# Patient Record
Sex: MALE | Race: WHITE | NOT HISPANIC OR LATINO | Employment: OTHER | URBAN - METROPOLITAN AREA
[De-identification: names, ages, dates, MRNs, and addresses within clinical notes are randomized per-mention and may not be internally consistent; named-entity substitution may affect disease eponyms.]

---

## 2017-02-23 ENCOUNTER — HOSPITAL ENCOUNTER (EMERGENCY)
Facility: OTHER | Age: 52
Discharge: HOME | End: 2017-02-23
Attending: EMERGENCY MEDICINE
Payer: MEDICAID

## 2017-02-23 VITALS
RESPIRATION RATE: 18 BRPM | DIASTOLIC BLOOD PRESSURE: 89 MMHG | WEIGHT: 162 LBS | OXYGEN SATURATION: 99 % | HEART RATE: 71 BPM | BODY MASS INDEX: 26.96 KG/M2 | TEMPERATURE: 98 F | SYSTOLIC BLOOD PRESSURE: 129 MMHG

## 2017-02-23 DIAGNOSIS — R10.9 ABDOMINAL PAIN, UNSPECIFIED LOCATION: Primary | ICD-10-CM

## 2017-02-23 DIAGNOSIS — R10.9 FLANK PAIN: ICD-10-CM

## 2017-02-23 DIAGNOSIS — N20.0 KIDNEY STONE: ICD-10-CM

## 2017-02-23 LAB
ALBUMIN SERPL-MCNC: 3.7 G/DL (ref 3.3–5.5)
ALP SERPL-CCNC: 64 U/L (ref 42–141)
BILIRUB SERPL-MCNC: 0.7 MG/DL (ref 0.2–1.6)
BILIRUB SERPL-MCNC: NEGATIVE MG/DL
BLOOD, POC UA: NORMAL
BUN SERPL-MCNC: 13 MG/DL (ref 7–22)
CALCIUM SERPL-MCNC: 8.7 MG/DL (ref 8–10.3)
CHLORIDE SERPL-SCNC: 103 MMOL/L (ref 98–108)
CLARITY, POC UA: NORMAL
COLOR, POC UA: NORMAL
CREAT SERPL-MCNC: 0.8 MG/DL (ref 0.6–1.2)
GLUCOSE SERPL-MCNC: 117 MG/DL (ref 73–118)
GLUCOSE SERPL-MCNC: NEGATIVE MG/DL (ref 70–110)
LEUKOCYTE EST, POC UA: NEGATIVE
NITRITE, POC UA: NEGATIVE
PH SMN: 7 [PH]
POC ALT (SGPT): 45 (ref 10–47)
POC AST (SGOT): 47 (ref 11–38)
POC KETONES, BLOOD: NEGATIVE
POC TCO2: 23 (ref 18–33)
POTASSIUM BLD-SCNC: 4.8 MMOL/L (ref 3.6–5.1)
PROTEIN, POC: 7.8 (ref 6.4–8.1)
PROTEIN, POC: NEGATIVE
SODIUM BLD-SCNC: 140 MMOL/L (ref 128–145)
SPECIFIC GRAVITY, POC UA: 1.02
UROBILINOGEN, POC UA: 1 E.U./DL

## 2017-02-23 PROCEDURE — 85025 COMPLETE CBC W/AUTO DIFF WBC: CPT

## 2017-02-23 PROCEDURE — 63600175 PHARM REV CODE 636 W HCPCS: Performed by: EMERGENCY MEDICINE

## 2017-02-23 PROCEDURE — 25500020 PHARM REV CODE 255: Performed by: EMERGENCY MEDICINE

## 2017-02-23 PROCEDURE — 96374 THER/PROPH/DIAG INJ IV PUSH: CPT

## 2017-02-23 PROCEDURE — 81003 URINALYSIS AUTO W/O SCOPE: CPT

## 2017-02-23 PROCEDURE — 80053 COMPREHEN METABOLIC PANEL: CPT

## 2017-02-23 PROCEDURE — 25000003 PHARM REV CODE 250: Performed by: EMERGENCY MEDICINE

## 2017-02-23 PROCEDURE — 96375 TX/PRO/DX INJ NEW DRUG ADDON: CPT

## 2017-02-23 PROCEDURE — 99284 EMERGENCY DEPT VISIT MOD MDM: CPT | Mod: 25

## 2017-02-23 PROCEDURE — 96361 HYDRATE IV INFUSION ADD-ON: CPT

## 2017-02-23 RX ORDER — HYDROMORPHONE HYDROCHLORIDE 1 MG/ML
1 INJECTION, SOLUTION INTRAMUSCULAR; INTRAVENOUS; SUBCUTANEOUS
Status: COMPLETED | OUTPATIENT
Start: 2017-02-23 | End: 2017-02-23

## 2017-02-23 RX ORDER — MORPHINE SULFATE 2 MG/ML
4 INJECTION, SOLUTION INTRAMUSCULAR; INTRAVENOUS
Status: COMPLETED | OUTPATIENT
Start: 2017-02-23 | End: 2017-02-23

## 2017-02-23 RX ORDER — CLOPIDOGREL BISULFATE 75 MG/1
75 TABLET ORAL DAILY
Qty: 30 TABLET | Refills: 0 | Status: SHIPPED | OUTPATIENT
Start: 2017-02-23 | End: 2017-04-27

## 2017-02-23 RX ORDER — ONDANSETRON 4 MG/1
4 TABLET, ORALLY DISINTEGRATING ORAL
Status: COMPLETED | OUTPATIENT
Start: 2017-02-23 | End: 2017-02-23

## 2017-02-23 RX ORDER — HYDROCODONE BITARTRATE AND ACETAMINOPHEN 5; 325 MG/1; MG/1
1 TABLET ORAL EVERY 6 HOURS PRN
Qty: 12 TABLET | Refills: 0 | Status: SHIPPED | OUTPATIENT
Start: 2017-02-23 | End: 2017-05-17

## 2017-02-23 RX ORDER — SODIUM CHLORIDE 9 MG/ML
1000 INJECTION, SOLUTION INTRAVENOUS
Status: COMPLETED | OUTPATIENT
Start: 2017-02-23 | End: 2017-02-23

## 2017-02-23 RX ADMIN — IOHEXOL 100 ML: 350 INJECTION, SOLUTION INTRAVENOUS at 11:02

## 2017-02-23 RX ADMIN — MORPHINE SULFATE 4 MG: 2 INJECTION, SOLUTION INTRAMUSCULAR; INTRAVENOUS at 10:02

## 2017-02-23 RX ADMIN — HYDROMORPHONE HYDROCHLORIDE 1 MG: 1 INJECTION, SOLUTION INTRAMUSCULAR; INTRAVENOUS; SUBCUTANEOUS at 01:02

## 2017-02-23 RX ADMIN — ONDANSETRON 4 MG: 4 TABLET, ORALLY DISINTEGRATING ORAL at 10:02

## 2017-02-23 RX ADMIN — SODIUM CHLORIDE 1000 ML: 9 INJECTION, SOLUTION INTRAVENOUS at 10:02

## 2017-02-23 NOTE — ED NOTES
Visit Vitals    /63    Pulse 76    Temp 98.3 °F (36.8 °C)    Resp 18    Wt 73.5 kg (162 lb)    SpO2 100%    BMI 26.96 kg/m2       General Appearance:  Patient to room 9 with right lower back pain, radiating to right lower abd onset 2 days     Awake, Alert, cooperative, no distress, appears stated age   Head:    Normocephalic, without obvious abnormality, atraumatic   Eyes:    PERRL, conjunctiva/corneas clear, glass on face       Ears:    Normal TM's and external ear canals, both ears   Nose:   Nares normal, septum midline, mucosa normal, no drainage    or sinus tenderness   Throat:   Lips, mucosa, and tongue normal; teeth and gums normal   Neck:   Supple, symmetrical, trachea midline, no adenopathy;        thyroid:  No enlargement/tenderness/nodules; no carotid    bruit or JVD   Back:     Symmetric, no curvature, ROM normal, no CVA tenderness   Lungs:     Clear to auscultation bilaterally anterior, posterior lobes, respirations unlabored   Chest wall:    No tenderness or deformity   Heart:    Regular rate and rhythm per EKG   Abdomen:     Soft, non-tender to touch, bowel sounds active all four quadrants,    Extremities:   Extremities normal, atraumatic, no cyanosis or edema   Pulses:   2+ and symmetric all extremities   Skin:   Skin color, texture, turgor normal, no rashes or lesions   Lymph nodes:   Cervical, supraclavicular, and axillary nodes normal   Neurologic:   CNII-XII intact. Normal strength, sensation and reflexes       throughout

## 2017-02-23 NOTE — ED PROVIDER NOTES
"EM PHYSICIAN NOTE  I have assumed care of this patient and have begun my initial patient assessment.    HPI  Larry Lemon is a 51 y.o. male who arrived at [unfilled] on [unfilled] with a complaint of Chief Complaint   Patient presents with    Back Pain     reports lower back pain, states started on yesterday     REVIEW of PMH, SOC History and Family History:  Past Medical History   Diagnosis Date    Anxiety     Depression     Heart murmur     Hepatitis C     History of psychiatric hospitalization      5 previous hospitalizations.  Last was in 2015 at [facility]    HTN (hypertension) 11/6/2014    Hx of psychiatric care      Celexa, Zoloft, Seroquel     Psychiatric problem      depression and anxiety    Stroke      right side weakness    Therapy      reports last saw psychiatrist last year and he does not remember the name     Past Surgical History   Procedure Laterality Date    Foot surgery      Appendectomy       Social History     Social History    Marital status:      Spouse name: N/A    Number of children: 4    Years of education: N/A     Occupational History    none      Social History Main Topics    Smoking status: Current Every Day Smoker     Packs/day: 2.00     Years: 25.00     Types: Cigarettes    Smokeless tobacco: Not on file      Comment: patient ran out of Chantix    Alcohol use Yes      Comment: Occasionally, last use was 3 days ago    Drug use: Yes     Special: "Crack" cocaine, Marijuana      Comment: using both daily, $100 of crack daily    Sexual activity: Yes     Partners: Female      Comment:      Other Topics Concern    Patient Feels They Ought To Cut Down On Drinking/Drug Use Yes    Patient Annoyed By Others Criticizing Their Drinking/Drug Use Yes    Patient Has Felt Bad Or Guilty About Drinking/Drug Use Yes    Patient Has Had A Drink/Used Drugs As An Eye Opener In [place] No     Social History Narrative    ** Merged History Encounter **      "     Patient Active Problem List   Diagnosis    History of cerebral infarction    Essential hypertension    Humerus fracture    Limb pain    Infected dental carries    Hyperlipidemia    Hemiparesis affecting dominant side as late effect of cerebrovascular accident    Dysarthria    Gait instability    Pain in shoulder    Substance induced mood disorder    Cocaine use disorder, severe, dependence    Marijuana use, continuous    Tobacco abuse disorder    Microcytic anemia    Flank pain    Abdominal pain     Current Facility-Administered Medications   Medication Dose Route Frequency Provider Last Rate Last Dose    hydromorphone (PF) injection 1 mg  1 mg Intravenous ED 1 Time Grzegorz Campo MD         Current Outpatient Prescriptions   Medication Sig Dispense Refill    clopidogrel (PLAVIX) 75 mg tablet Take 1 tablet (75 mg total) by mouth once daily. 30 tablet 5    lisinopril (PRINIVIL,ZESTRIL) 40 MG tablet Take 1 tablet (40 mg total) by mouth once daily.      baclofen (LIORESAL) 10 MG tablet Take 1-2 Q HS for spasms 60 tablet 6    buPROPion (WELLBUTRIN XL) 150 MG TB24 tablet Take 1 tablet (150 mg total) by mouth once daily. 30 tablet 0    mirtazapine (REMERON SOL-TAB) 30 MG disintegrating tablet Take 1 tablet (30 mg total) by mouth nightly. 30 tablet 0    simvastatin (ZOCOR) 40 MG tablet Take 1 tablet (40 mg total) by mouth every evening. 30 tablet 5    tamsulosin (FLOMAX) 0.4 mg Cp24 Take 1 capsule (0.4 mg total) by mouth once daily. 10 capsule 0    varenicline (CHANTIX) 1 mg Tab Take 1 tablet (1 mg total) by mouth 2 (two) times daily. 60 tablet 1     Review of patient's allergies indicates:   Allergen Reactions    Aspirin Shortness Of Breath    Toradol [ketorolac] Shortness Of Breath    Toradol [ketorolac] Anaphylaxis    Aspirin     Codeine     Penicillins     Shellfish containing products     Sulfa (sulfonamide antibiotics)         Immunization History   Administered Date(s)  Administered    Tdap 04/28/2016     Family History   Problem Relation Age of Onset    Drug abuse Mother     Alcohol abuse Father        REVIEW of SYSTEMS  Source: pt    GENERAL/CONSTITUTIONAL: There is no report of fever, fatigue, weakness, or weight loss.  HEAD, EYES, EARS, NOSE AND THROAT: Eyes - There is no report of eye pain. There is no report of loss of hearing or nosebleeds   CARDIOVASCULAR: There is no report of chest pain, irregular heartbeats or palpitation, shortness of breath  RESPIRATORY: There is no report of cough, coughing up blood, coughing up mucus, wheezing or night sweats.  GASTROINTESTINAL: see hpi  GENITOURINARY: There is report of burning with urination  MUSCULOSKELETAL: There is no report of joint or muscle pain. No muscle weakness or tenderness.   SKIN AND BREASTS: There is no report of easy bruising, skin redness, skin rash  NEUROLOGIC: There is no report of headache, dizziness, fainting, loss of consciousness  HEMATOLOGIC/LYMPHATIC: There is no report of anemia, bleeding tendency or clotting tendency.  There is report of anticoagulant use: PLAVIX.  See HPI; the remainder of the ROS is negative.    PHYSICAL EXAMINATION  ED Triage Vitals   Enc Vitals Group      BP 02/23/17 0936 121/63      Pulse 02/23/17 0936 76      Resp 02/23/17 0936 18      Temp 02/23/17 0936 98.3 °F (36.8 °C)      Temp src --       SpO2 02/23/17 0936 100 %      Weight 02/23/17 0936 162 lb      Height --       Head Cir --       Peak Flow --       Pain Score --       Pain Loc --       Pain Edu? --       Excl. in GC? --      Vital signs and Pulse Ox reviewed in clinical context: wnl         Pt's level of consciousness is: Casually dressed, and mild distress.  Skin:Normal  Cardiac exam: regular rate and rhythm, no murmurs  Pulmonary exam: unlabored breathing  Abd Exam: soft, nontender, normal bowel sounds  Musculoskeletal: no joint tenderness, deformity or swelling  Neurologic: cni; 5/5     Labs Reviewed   POCT CBC   POCT  URINALYSIS W/O SCOPE   POCT URINALYSIS W/O SCOPE   POCT CMP   POCT CMP    Study Desc:   CT ABDOMEN PELVIS WITH CONTRAST  Clinical History: Left-sided pain status post fall  Comparison exam: None.     TECHNIQUE: Helical CT imaging of the abdomen and pelvis was performed from the lung bases   through the lesser trochanters following the administration of intravenous contrast.    Axial, sagittal and coronal multiplanar reconstructions provided.  IV contrast: 100 cc iodinated contrast  Oral contrast: No  Exam DLP: 1123 mGy-cm.     FINDINGS:  The visualized lung bases are clear.  No pleural effusions.     The liver, spleen, gallbladder, pancreas, and adrenal glands are unremarkable.    Pancreatic duct at upper limits of normal size without glandular atrophy, tapering at the   ampulla.  No intrahepatic or extrahepatic biliary ductal dilatation.     The kidneys demonstrate symmetric enhancement and excretion of intravenous contrast   without hydronephrosis or stone.  Bladder is unremarkable.     Small hiatal hernia.. The gastrointestinal tract is normal in course and caliber without   focal wall thickening or evidence of obstruction.  Appendix not well seen, possibly   surgically absent.  Sigmoid diverticulosis without evidence of acute diverticulitis.     Shotty subcentimeter periaortic and periportal lymph nodes without pathologic   enlargement.  Mesenteric, retroperitoneal or pelvic adenopathy. No free intraperitoneal   fluid or free air.     No acute osseous abnormality identified.     IMPRESSION:  1.   No acute abnormality identified in the abdomen and pelvis.     2.  Incidental findings:  -Sigmoid diverticulosis without evidence of acute diverticulitis.  -Shotty subcentimeter retroperitoneal lymph nodes without pathologic enlargement,   possibly reactive     SL: 23  Signed by: Jonatan Alvarado MD  2017-02-23 11:32:55 [CST]    LABS: wnl;  UA: +rbcs  Medical decision making: History obtained from family, Labs reviewed  ua + and Radiograph reviewed wnl  Impression: unexplained abd pain; likely renal stone  Plan: analgesics; fup pcp  Grzegorz Campo MD, Emergency Medicine Faculty at 12:59 PM 2/23/2017           Grzegorz Campo MD  02/23/17 8117

## 2017-02-23 NOTE — DISCHARGE INSTRUCTIONS
Kidney Stone (with Pain)    The sharp cramping pain on either side of your lower back and nausea/vomiting that you have are because of a small stone that has formed in the kidney. It is now passing down a narrow tube (ureter) on its way to your bladder. Once the stone reaches your bladder, the pain will often stop. But it may come back as the stone continues to pass out of the bladder and through the urethra. The stone may pass in your urine stream in one piece. The size may be 1/16 inch to 1/4 inch (1 mm to 6 mm). Or, the stone may break up into shae fragments that you may not even notice.  Once you have had a kidney stone, you are at risk of getting another one in the future. There are 4 types of kidney stones. Eighty percent are calcium stones--mostly calcium oxalate but also some with calcium phosphate. The other 3 types include uric acid stones, struvite stones (from a preceding infection), and rarely, cystine stones.  Most stones will pass on their own, but may take from a few hours to a few days. Sometimes the stone is too large to pass by itself. In that case, the healthcare provider will need to use other ways to remove the stone. These techniques include:  · Lithotripsy. This uses ultrasound waves to break up the stone.  · Ureteroscopy. This pushes a basket-like instrument through the urethra and bladder and into the ureter to pull out the stone.  · Various types of direct surgery through the skin  Home care  The following are general care guidelines:  · Drink plenty of fluids. This means at least 12, 8-ounce glasses of fluid--mostly water--a day.  · Each time you urinate, do so in a jar. Pour the urine from the jar through the strainer and into the toilet. Continue doing this until 24 hours after your pain stops. By then, if there was a kidney stone, it should pass from your bladder. Some stones dissolve into sand-like particles and pass right through the strainer. In that case, you wont ever see a  stone.  · Save any stone that you find in the strainer and bring it to your healthcare provider to look at. It may be possible to stop certain types of stones from forming. For this reason, it is important to know what kind of stone you have.  · Try to stay as active as possible. This will help the stone pass. Don't stay in bed unless your pain keeps you from getting up. You may notice a red, pink, or brown color to your urine. This is normal while passing a kidney stone.  · If you develop pain, you may take ibuprofen or naproxen for pain, unless another medicine was prescribed. If you have chronic liver or kidney disease, talk with your healthcare provider before taking these medicines. Also talk with your provider if you've had a stomach ulcer or GI bleeding.  Preventing stones  Each year for the next 5 to 7 years, you are at risk that a new stone will form. Your risk is a 50% chance over this time period. The risk is higher if you have a family history of kidney stones or have certain chronic illnesses like hypertension, obesity, or diabetes. But you can make changes to your lifestyle and diet that can lower your risk for another stone.  Most kidney stones are made of calcium. The following is advice for preventing another calcium stone. If you dont know the type of stone you have, follow this advice until the cause of your stone is found.  Things that help:  · The most important thing you can do is to drink plenty of fluids each day. See home care above.   · Eat foods that contain phytates. These include wheat, rice, rye, barley, and beans. Phytates are substances that may lower your risk for any type of stone to form.  · Eat more fruits and vegetables. Choose those that are high in potassium.  · Eat foods high in natural citrate like fruit and low-sugar fruit juices.  · Having too little calcium in your diet can put you at risk for calcium kidney stones. Eat a normal amount of calcium in your diet and  talk with your healthcare provider if you are taking calcium supplements. Cutting back on your calcium intake may raise your risk. New research shows that eating calcium-rich and oxalate-rich foods together lowers your risk for stones by binding the minerals in the stomach and intestines before they can reach the kidneys.    · Limit salt intake to 2 grams (1 teaspoon) per day. Use limited amounts when cooking, and dont add salt at the table. Processed and canned foods are usually high in salt.   · Spinach, rhubarb, peanuts, cashews, almonds, grapefruit, and grapefruit juice are all high oxalate foods. You should limit how much of these you eat. Or eat them with calcium-rich foods. These include dairy products, dark leafy greens, soy products, and calcium-enriched foods.  · Reducing the amount of animal meat and high protein foods in your diet may lower your risk for uric acid stones.  · Avoid excess sugar (sucrose) and fructose (sweetener in many soft drinks) in your diet.   · If you take vitamin C as a supplement, don't take more than 1,000 mg a day.  · A dietitian or your healthcare provider can give you information about changes in your diet that will help prevent more kidney stones from forming.  Follow-up care  Follow up with your healthcare provider, or as advised, if the pain lasts more than 48 hours. Talk with your provider about urine and blood tests to find out the cause of your stone. If you had an X-ray, CT scan, or other diagnostic test, you will be told of any new findings that may affect your care.  Call 911  Call 911 if you have any of these:  · Weakness, dizziness, or fainting  When to seek medical advice  Call your healthcare provider right away if any of these occur:  · Pain that is not controlled by the medicine given  · Repeated vomiting or unable to keep down fluids  · Fever of 100.4ºF (38ºC) or higher, or as directed by your healthcare provider  · Passage of solid red or brown urine (can't  see through it) or urine with lots of blood clots  · Foul-smelling or cloudy urine  · Unable to pass urine for 8 hours and increasing bladder pressure  Date Last Reviewed: 10/1/2016  © 0565-2810 [employer]. [address] [place] [address], [place] [address]. All rights reserved. This information is not intended as a substitute for professional medical care. Always follow your healthcare professional's instructions.

## 2017-02-23 NOTE — ED AVS SNAPSHOT
[facility] EMERGENCY DEPARTMENT  [address] [place] [address]               Larry Lemon   2017  9:35 AM   ED    Description:  Male : 1965   Department:  [facility] Emergency Department           Your Care was Coordinated By:     Provider Role From To    Raza Sanchez MD Attending Provider 17 [phone]    Grzegorz Campo MD Attending Provider 17 1122 --      Reason for Visit     Back Pain           Diagnoses this Visit        Comments    Abdominal pain, unspecified location    -  Primary     Flank pain         Kidney stone           ED Disposition     ED Disposition Condition Comment    Discharge             To Do List           Follow-up Information     Follow up with Primary Doctor No In 1 week(s).       These Medications        Disp Refills Start End    hydrocodone-acetaminophen 5-325mg (NORCO) 5-325 mg per tablet 12 tablet 0 2017     Take 1 tablet by mouth every 6 (six) hours as needed for Pain. - Oral    Pharmacy: [facility] - [place], [place] - [address] Ph #: [phone]       clopidogrel (PLAVIX) 75 mg tablet 30 tablet 0 2017     Take 1 tablet (75 mg total) by mouth once daily. - Oral    Pharmacy: [facility] - [place], [place] - [address] Ph #: [phone]         [facility]     [facility] Nurse Care Line -  Assistance  Registered nurses in the [facility] provide clinical advisement, health education, appointment booking, and other advisory services.  Call for this free service at [phone].             Medications           Message regarding Medications     Verify the changes and/or additions to your medication regime listed below are the same as discussed with your clinician today.  If any of these changes or additions are incorrect, please notify your healthcare provider.        START taking these NEW  medications        Refills    hydrocodone-acetaminophen 5-325mg (NORCO) 5-325 mg per tablet 0    Sig: Take 1 tablet by mouth every 6 (six) hours as needed for Pain.    Class: Print    Route: Oral      These medications were administered today        Dose Freq    morphine injection 4 mg 4 mg ED 1 Time    Sig: Inject 2 mLs (4 mg total) into the vein ED 1 Time.    Class: Normal    Route: Intravenous    ondansetron disintegrating tablet 4 mg 4 mg ED 1 Time    Sig: Take 1 tablet (4 mg total) by mouth ED 1 Time.    Class: Normal    Route: Oral    0.9%  NaCl infusion 1,000 mL ED 1 Time    Sig: Inject 1,000 mLs into the vein ED 1 Time.    Class: Normal    Route: Intravenous    omnipaque 350 iohexol 100 mL 100 mL IMG once as needed    Sig: Inject 100 mLs into the vein ONCE PRN for contrast.    Class: Normal    Route: Intravenous    hydromorphone (PF) injection 1 mg 1 mg ED 1 Time    Sig: Inject 1 mL (1 mg total) into the vein ED 1 Time.    Class: Normal    Route: Intravenous           Verify that the below list of medications is an accurate representation of the medications you are currently taking.  If none reported, the list may be blank. If incorrect, please contact your healthcare provider. Carry this list with you in case of emergency.           Current Medications     lisinopril (PRINIVIL,ZESTRIL) 40 MG tablet Take 1 tablet (40 mg total) by mouth once daily.    baclofen (LIORESAL) 10 MG tablet Take 1-2 Q HS for spasms    buPROPion (WELLBUTRIN XL) 150 MG TB24 tablet Take 1 tablet (150 mg total) by mouth once daily.    clopidogrel (PLAVIX) 75 mg tablet Take 1 tablet (75 mg total) by mouth once daily.    hydrocodone-acetaminophen 5-325mg (NORCO) 5-325 mg per tablet Take 1 tablet by mouth every 6 (six) hours as needed for Pain.    hydromorphone (PF) injection 1 mg Inject 1 mL (1 mg total) into the vein ED 1 Time.    mirtazapine (REMERON SOL-TAB) 30 MG disintegrating tablet Take 1 tablet (30 mg total) by mouth nightly.     simvastatin (ZOCOR) 40 MG tablet Take 1 tablet (40 mg total) by mouth every evening.    tamsulosin (FLOMAX) 0.4 mg Cp24 Take 1 capsule (0.4 mg total) by mouth once daily.    varenicline (CHANTIX) 1 mg Tab Take 1 tablet (1 mg total) by mouth 2 (two) times daily.           Clinical Reference Information           Your Vitals Were     BP Pulse Temp Resp Weight SpO2    121/63 76 98.3 °F (36.8 °C) 18 73.5 kg (162 lb) 100%    BMI                26.96 kg/m2          Allergies as of 2/23/2017        Reactions    Aspirin Shortness Of Breath    Toradol [Ketorolac] Shortness Of Breath    Toradol [Ketorolac] Anaphylaxis    Aspirin     Codeine     Penicillins     Shellfish Containing Products     Sulfa (Sulfonamide Antibiotics)       Immunizations Administered on Date of Encounter - 2/23/2017     None      ED Micro, Lab, POCT     Start Ordered       Status Ordering Provider    02/23/17 1044 02/23/17 1044  POCT URINALYSIS W/O SCOPE  Once      Final result     02/23/17 1035 02/23/17 1035  POCT CMP  Once      Final result     02/23/17 0953 02/23/17 0953  POCT URINALYSIS W/O SCOPE  Once      Completed     02/23/17 0952 02/23/17 0953  POCT CBC  Once      Final result     02/23/17 0952 02/23/17 0953  POCT CMP  Once      Completed       ED Imaging Orders     Start Ordered       Status Ordering Provider    02/23/17 0952 02/23/17 0953  CT Abdomen Pelvis With Contrast  1 time imaging      Final result         Discharge Instructions           Kidney Stone (with Pain)    The sharp cramping pain on either side of your lower back and nausea/vomiting that you have are because of a small stone that has formed in the kidney. It is now passing down a narrow tube (ureter) on its way to your bladder. Once the stone reaches your bladder, the pain will often stop. But it may come back as the stone continues to pass out of the bladder and through the urethra. The stone may pass in your urine stream in one piece. The size may be 1/16 inch to 1/4 inch  (1 mm to 6 mm). Or, the stone may break up into shae fragments that you may not even notice.  Once you have had a kidney stone, you are at risk of getting another one in the future. There are 4 types of kidney stones. Eighty percent are calcium stones--mostly calcium oxalate but also some with calcium phosphate. The other 3 types include uric acid stones, struvite stones (from a preceding infection), and rarely, cystine stones.  Most stones will pass on their own, but may take from a few hours to a few days. Sometimes the stone is too large to pass by itself. In that case, the healthcare provider will need to use other ways to remove the stone. These techniques include:  · Lithotripsy. This uses ultrasound waves to break up the stone.  · Ureteroscopy. This pushes a basket-like instrument through the urethra and bladder and into the ureter to pull out the stone.  · Various types of direct surgery through the skin  Home care  The following are general care guidelines:  · Drink plenty of fluids. This means at least 12, 8-ounce glasses of fluid--mostly water--a day.  · Each time you urinate, do so in a jar. Pour the urine from the jar through the strainer and into the toilet. Continue doing this until 24 hours after your pain stops. By then, if there was a kidney stone, it should pass from your bladder. Some stones dissolve into sand-like particles and pass right through the strainer. In that case, you wont ever see a stone.  · Save any stone that you find in the strainer and bring it to your healthcare provider to look at. It may be possible to stop certain types of stones from forming. For this reason, it is important to know what kind of stone you have.  · Try to stay as active as possible. This will help the stone pass. Don't stay in bed unless your pain keeps you from getting up. You may notice a red, pink, or brown color to your urine. This is normal while passing a kidney stone.  · If you develop pain, you may  take ibuprofen or naproxen for pain, unless another medicine was prescribed. If you have chronic liver or kidney disease, talk with your healthcare provider before taking these medicines. Also talk with your provider if you've had a stomach ulcer or GI bleeding.  Preventing stones  Each year for the next 5 to 7 years, you are at risk that a new stone will form. Your risk is a 50% chance over this time period. The risk is higher if you have a family history of kidney stones or have certain chronic illnesses like hypertension, obesity, or diabetes. But you can make changes to your lifestyle and diet that can lower your risk for another stone.  Most kidney stones are made of calcium. The following is advice for preventing another calcium stone. If you dont know the type of stone you have, follow this advice until the cause of your stone is found.  Things that help:  · The most important thing you can do is to drink plenty of fluids each day. See home care above.   · Eat foods that contain phytates. These include wheat, rice, rye, barley, and beans. Phytates are substances that may lower your risk for any type of stone to form.  · Eat more fruits and vegetables. Choose those that are high in potassium.  · Eat foods high in natural citrate like fruit and low-sugar fruit juices.  · Having too little calcium in your diet can put you at risk for calcium kidney stones. Eat a normal amount of calcium in your diet and talk with your healthcare provider if you are taking calcium supplements. Cutting back on your calcium intake may raise your risk. New research shows that eating calcium-rich and oxalate-rich foods together lowers your risk for stones by binding the minerals in the stomach and intestines before they can reach the kidneys.    · Limit salt intake to 2 grams (1 teaspoon) per day. Use limited amounts when cooking, and dont add salt at the table. Processed and canned foods are usually high in salt.   · Spinach,  rhubarb, peanuts, cashews, almonds, grapefruit, and grapefruit juice are all high oxalate foods. You should limit how much of these you eat. Or eat them with calcium-rich foods. These include dairy products, dark leafy greens, soy products, and calcium-enriched foods.  · Reducing the amount of animal meat and high protein foods in your diet may lower your risk for uric acid stones.  · Avoid excess sugar (sucrose) and fructose (sweetener in many soft drinks) in your diet.   · If you take vitamin C as a supplement, don't take more than 1,000 mg a day.  · A dietitian or your healthcare provider can give you information about changes in your diet that will help prevent more kidney stones from forming.  Follow-up care  Follow up with your healthcare provider, or as advised, if the pain lasts more than 48 hours. Talk with your provider about urine and blood tests to find out the cause of your stone. If you had an X-ray, CT scan, or other diagnostic test, you will be told of any new findings that may affect your care.  Call 911  Call 911 if you have any of these:  · Weakness, dizziness, or fainting  When to seek medical advice  Call your healthcare provider right away if any of these occur:  · Pain that is not controlled by the medicine given  · Repeated vomiting or unable to keep down fluids  · Fever of 100.4ºF (38ºC) or higher, or as directed by your healthcare provider  · Passage of solid red or brown urine (can't see through it) or urine with lots of blood clots  · Foul-smelling or cloudy urine  · Unable to pass urine for 8 hours and increasing bladder pressure  Date Last Reviewed: 10/1/2016  © 5528-0095 [employer]. [address] [place] [address], [place] [address]. All rights reserved. This information is not intended as a substitute for professional medical care. Always follow your healthcare professional's instructions.          [facility] Sign-Up     Activating your [facility] account is as easy as 1-2-3!      1) Visit [email or web address], select Sign Up Now, enter this activation code and your date of birth, then select Next.  [email or web address]  Expires: 4/9/2017  1:07 PM      2) Create a username and password to use when you visit [facility] in the future and select a security question in case you lose your password and select Next.    3) Enter your e-mail address and click Sign Up!    Additional Information  If you have questions, please e-mail [email or web address] or call [phone] to talk to our [facility] staff. Remember, [facility] is NOT to be used for urgent needs. For medical emergencies, dial 911.         Smoking Cessation     If you would like to quit smoking:   You may be eligible for free services if you are a [place] resident and started smoking cigarettes before September 1, 1988.  Call the Smoking Cessation Trust ([facility]) toll free at ([phone] [phone] or ([phone] [phone].   Call 7-237-QUIT-NOW if you do not meet the above criteria.             [facility] complies with applicable Federal civil rights laws and does not discriminate on the basis of race, color, national origin, age, disability, or sex.        Language Assistance Services     ATTENTION: Language assistance services are available, free of charge. Please call [phone].      ATENCIÓN: Si habla español, tiene a alves disposición servicios gratuitos de asistencia lingüística. Llame al [phone].     CHÚ Ý: N?u b?n nói Ti?ng Vi?t, có các d?ch v? h? tr? ngôn ng? mi?n phí dành cho b?n. G?i s? [phone].

## 2017-02-23 NOTE — ED PROVIDER NOTES
Encounter Date: 2/23/2017       History     Chief Complaint   Patient presents with    Back Pain     reports lower back pain, states started on yesterday     Review of patient's allergies indicates:   Allergen Reactions    Aspirin Shortness Of Breath    Toradol [ketorolac] Shortness Of Breath    Toradol [ketorolac] Anaphylaxis    Aspirin     Codeine     Penicillins     Shellfish containing products     Sulfa (sulfonamide antibiotics)      HPI Comments: Mr Lemon has hx of cva on plavix, walks w cane at baseline otherwise very functional. Reports slipped and fell ~3am yesterday morning with home accident related to dog. Landed on R flank and R hip. Reports painful urination and pain in L flank and LLQ. Vomited x2 today. No cp, syncope or other complaints.     The history is provided by the patient.     Past Medical History   Diagnosis Date    Anxiety     Depression     Heart murmur     Hepatitis C     History of psychiatric hospitalization      5 previous hospitalizations.  Last was in 2015 [facility]    HTN (hypertension) 11/6/2014    Hx of psychiatric care      Celexa, Zoloft, Seroquel     Psychiatric problem      depression and anxiety    Stroke      right side weakness    Therapy      reports last saw psychiatrist last year and he does not remember the name     Past Surgical History   Procedure Laterality Date    Foot surgery      Appendectomy       Family History   Problem Relation Age of Onset    Drug abuse Mother     Alcohol abuse Father      Social History   Substance Use Topics    Smoking status: Current Every Day Smoker     Packs/day: 2.00     Years: 25.00     Types: Cigarettes    Smokeless tobacco: Not on file      Comment: patient ran out of Chantix    Alcohol use Yes      Comment: Occasionally, last use was 3 days ago     Review of Systems   Constitutional: Negative.    Respiratory: Negative.    Genitourinary: Positive for dysuria and flank pain. Negative for difficulty  urinating, discharge, penile pain and penile swelling.   Musculoskeletal: Positive for back pain. Negative for gait problem.   All other systems reviewed and are negative.      Physical Exam   Initial Vitals   BP Pulse Resp Temp SpO2   02/23/17 0936 02/23/17 0936 02/23/17 0936 02/23/17 0936 02/23/17 0936   121/63 76 18 98.3 °F (36.8 °C) 100 %     Physical Exam    Nursing note and vitals reviewed.  Constitutional: He appears well-developed and well-nourished.   HENT:   Head: Normocephalic and atraumatic.   Neck: Normal range of motion.   Cardiovascular: Normal rate, regular rhythm and normal heart sounds.   Pulmonary/Chest: Breath sounds normal. No respiratory distress. He has no wheezes. He has no rales.   Abdominal: Soft. He exhibits no distension. There is tenderness.       Musculoskeletal: Normal range of motion. He exhibits tenderness.   Pain in R flank and R lower back. No bruising noted, no vertebral ttp.    Neurological: He is alert and oriented to person, place, and time.   Psychiatric: He has a normal mood and affect. His behavior is normal. Thought content normal.         ED Course   Procedures  Labs Reviewed   POCT CBC   POCT URINALYSIS W/O SCOPE   POCT URINALYSIS W/O SCOPE   POCT CMP   POCT CMP             Medical Decision Making:   Differential Diagnosis:   Trauma, intraabdominal bleeding, msk injury, renal injury  Clinical Tests:   Lab Tests: Ordered  Radiological Study: Ordered  ED Management:  Plan for symptomatic care, ivf, labs, CT. Final disposition pending. Care transferred to Dr. Campo.                    ED Course     Clinical Impression:   The primary encounter diagnosis was Abdominal pain, unspecified location. A diagnosis of Flank pain was also pertinent to this visit.          Raza Sanchez MD  02/23/17 1217

## 2017-04-27 ENCOUNTER — HOSPITAL ENCOUNTER (EMERGENCY)
Facility: OTHER | Age: 52
Discharge: HOME | End: 2017-04-27
Attending: EMERGENCY MEDICINE
Payer: MEDICAID

## 2017-04-27 VITALS
RESPIRATION RATE: 16 BRPM | BODY MASS INDEX: 30.36 KG/M2 | HEIGHT: 62 IN | OXYGEN SATURATION: 100 % | WEIGHT: 165 LBS | HEART RATE: 73 BPM | SYSTOLIC BLOOD PRESSURE: 125 MMHG | TEMPERATURE: 99 F | DIASTOLIC BLOOD PRESSURE: 87 MMHG

## 2017-04-27 DIAGNOSIS — Z76.0 MEDICATION REFILL: ICD-10-CM

## 2017-04-27 DIAGNOSIS — M54.9 BACK PAIN: ICD-10-CM

## 2017-04-27 DIAGNOSIS — M62.830 BACK MUSCLE SPASM: ICD-10-CM
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
## 2017-04-27 PROCEDURE — 99284 EMERGENCY DEPT VISIT MOD MDM: CPT

## 2017-04-27 PROCEDURE — 25000003 PHARM REV CODE 250: Performed by: EMERGENCY MEDICINE

## 2017-04-27 RX ORDER — CLOPIDOGREL BISULFATE 75 MG/1
75 TABLET ORAL DAILY
Qty: 30 TABLET | Refills: 0 | Status: ON HOLD | OUTPATIENT
Start: 2017-04-27 | End: 2017-05-15

## 2017-04-27 RX ORDER — TRAMADOL HYDROCHLORIDE 50 MG/1
50 TABLET ORAL EVERY 8 HOURS PRN
Qty: 10 TABLET | Refills: 0 | Status: SHIPPED | OUTPATIENT
Start: 2017-04-27 | End: 2017-05-07

## 2017-04-27 RX ORDER — DIAZEPAM 5 MG/1
5 TABLET ORAL
Status: COMPLETED | OUTPATIENT
Start: 2017-04-27 | End: 2017-04-27

## 2017-04-27 RX ORDER — DIAZEPAM 5 MG/1
5 TABLET ORAL EVERY 6 HOURS PRN
Qty: 10 TABLET | Refills: 0 | Status: ON HOLD | OUTPATIENT
Start: 2017-04-27 | End: 2018-04-10 | Stop reason: HOSPADM

## 2017-04-27 RX ADMIN — DIAZEPAM 5 MG: 5 TABLET ORAL at 07:04

## 2017-04-27 NOTE — ED NOTES
Pt identifiers checked and correct.    LOC: The patient is awake, alert and aware of environment with an appropriate affect, the patient is oriented x 3 and speaking appropriately.   APPEARANCE: Patient resting comfortably and in no acute distress, patient is clean and well groomed, patient's clothing is properly fastened.   SKIN: The skin is warm and dry, color consistent with ethnicity, patient has normal skin turgor and moist mucus membranes, skin intact, bruising noted to R posterior and interior thigh   MUSCULOSKELETAL: Patient moving all extremities spontaneously, no obvious swelling or deformities noted.   RESPIRATORY: Airway is open and patent, respirations are spontaneous, patient has a normal effort and rate, no accessory muscle use noted.   CARDIAC: Patient has a normal rate and regular rhythm, no periphreal edema noted, capillary refill < 3 seconds.   ABDOMEN: Soft and non tender to palpation, no distention noted, active bowel sounds present in all four quadrants.   NEUROLOGIC: PERRL, 3 mm bilaterally, eyes open spontaneously, behavior appropriate to situation, follows commands, facial expression symmetrical, bilateral hand grasp equal and even, purposeful motor response noted, normal sensation in all extremities when touched with a finger.

## 2017-04-27 NOTE — DISCHARGE INSTRUCTIONS
Back Care Tips    Caring for your back  These are things you can do to prevent a recurrence of acute back pain and to reduce symptoms from chronic back pain:  · Maintain a healthy weight. If you are overweight, losing weight will help most types of back pain.  · Exercise is an important part of recovery from most types of back pain. The muscles behind and in front of the spine support the back. This means strengthening both the back muscles and the abdominal muscles will provide better support for your spine.   · Swimming and brisk walking are good overall exercises to improve your fitness level.  · Practice safe lifting methods (below).  · Practice good posture when sitting, standing and walking. Avoid prolonged sitting. This puts more stress on the lower back than standing or walking.  · Wear quality shoes with sufficient arch support. Foot and ankle alignment can affect back symptoms. Women should avoid wearing high heels.  · Therapeutic massage can help relax the back muscles without stretching them.  · During the first 24 to 72 hours after an acute injury or flare-up of chronic back pain, apply an ice pack to the painful area for 20 minutes and then remove it for 20 minutes, over a period of 60 to 90 minutes, or several times a day. As a safety precaution, do not use a heating pad at bedtime. Sleeping on a heating pad can lead to skin burns or tissue damage.  · You can alternate ice and heat therapies.  Medications  Talk to your healthcare provider before using medicines, especially if you have other medical problems or are taking other medicines.  · You may use acetaminophen or ibuprofen to control pain, unless your healthcare provider prescribed other pain medicine. If you have chronic conditions like diabetes, liver or kidney disease, stomach ulcers, or gastrointestinal bleeding, or are taking blood thinners, talk with your healthcare provider before taking any medicines.  · Be careful if you are given  prescription pain medicines, narcotics, or medicine for muscle spasm. They can cause drowsiness, affect your coordination, reflexes, and judgment. Do not drive or operate heavy machinery while taking these types of medicines. Take prescription pain medicine only as prescribed by your healthcare provider.  Lumbar stretch  Here is a simple stretching exercise that will help relax muscle spasm and keep your back more limber. If exercise makes your back pain worse, dont do it.  · Lie on your back with your knees bent and both feet on the ground.  · Slowly raise your left knee to your chest as you flatten your lower back against the floor. Hold for 5 seconds.  · Relax and repeat the exercise with your right knee.  · Do 10 of these exercises for each leg.  Safe lifting method  · Dont bend over at the waist to lift an object off the floor.  Instead, bend your knees and hips in a squat.   · Keep your back and head upright  · Hold the object close to your body, directly in front of you.  · Straighten your legs to lift the object.   · Lower the object to the floor in the reverse fashion.  · If you must slide something across the floor, push it.  Posture tips  Sitting  Sit in chairs with straight backs or low-back support. Keep your knees lower than your hips, with your feet flat on the floor.  When driving, sit up straight. Adjust the seat forward so you are not leaning toward the steering wheel.  A small pillow or rolled towel behind your lower back may help if you are driving long distances.   Standing  When standing for long periods, shift most of your weight to one leg at a time. Alternate legs every few minutes.   Sleeping  The best way to sleep is on your side with your knees bent. Put a low pillow under your head to support your neck in a neutral spine position. Avoid thick pillows that bend your neck to one side. Put a pillow between your legs to further relax your lower back. If you sleep on your back, put pillows  under your knees to support your legs in a slightly flexed position. Use a firm mattress. If your mattress sags, replace it, or use a 1/2-inch plywood board under the mattress to add support.  Follow-up care  Follow up with your healthcare provider, or as advised.  If X-rays, a CT scan or an MRI scan were taken, they will be reviewed by a radiologist. You will be notified of any new findings that may affect your care.  Call 911  Seek emergency medical care if any of the following occur:  · Trouble breathing  · Confusion  · Very drowsy  · Fainting or loss of consciousness  · Rapid or very slow heart rate  · Loss of  bowel or bladder control  When to seek medical care  Call your healthcare provider if any of the following occur:  · Pain becomes worse or spreads to your arms or legs  · Weakness or numbness in one or both arms or legs  · Numbness in the groin area  Date Last Reviewed: 6/1/2016  © 7467-5607 [employer]. [address] [place] [address]. All rights reserved. This information is not intended as a substitute for professional medical care. Always follow your healthcare professional's instructions.          Back Pain (Acute or Chronic)    Back pain is one of the most common problems. The good news is that most people feel better in 1 to 2 weeks, and most of the rest in 1 to 2 months. Most people can remain active.  People experience and describe pain differently; not everyone is the same.  · The pain can be sharp, stabbing, shooting, aching, cramping or burning.  · Movement, standing, bending, lifting, sitting, or walking may worsen pain.  · It can be localized to one spot or area, or it can be more generalized.  · It can spread or radiate upwards, to the front, or go down your arms or legs (sciatica).  · It can cause muscle spasm.  Most of the time, mechanical problems with the muscles or spine cause the pain. Mechanical problems are usually caused by an injury to the muscles or  ligaments. While illness can cause back pain, it is usually not caused by a serious illness. Mechanical problems include:   · Physical activity such as sports, exercise, work, or normal activity  · Overexertion, lifting, pushing, pulling incorrectly or too aggressively  · Sudden twisting, bending, or stretching from an accident, or accidental movement  · Poor posture  · Stretching or moving wrong, without noticing pain at the time  · Poor coordination, lack of regular exercise (check with your doctor about this)  · Spinal disc disease or arthritis  · Stress  Pain can also be related to pregnancy, or illness like appendicitis, bladder or kidney infections, pelvic infections, and many other things.  Acute back pain usually gets better in 1 to 2 weeks. Back pain related to disk disease, arthritis in the spinal joints or spinal stenosis (narrowing of the spinal canal) can become chronic and last for months or years.  Unless you had a physical injury (for example, a car accident or fall) X-rays are usually not needed for the initial evaluation of back pain. If pain continues and does not respond to medical treatment, X-rays and other tests may be needed.  Home care  Try these home care recommendations:  · When in bed, try to find a position of comfort. A firm mattress is best. Try lying flat on your back with pillows under your knees. You can also try lying on your side with your knees bent up towards your chest and a pillow between your knees.  · At first, do not try to stretch out the sore spots. If there is a strain, it is not like the good soreness you get after exercising without an injury. In this case, stretching may make it worse.  · Avoid prolong sitting, long car rides, or travel. This puts more stress on the lower back than standing or walking.  · During the first 24 to 72 hours after an acute injury or flare up of chronic back pain, apply an ice pack to the painful area for 20 minutes and then remove it for  20 minutes. Do this over a period of 60 to 90 minutes or several times a day. This will reduce swelling and pain. Wrap the ice pack in a thin towel or plastic to protect your skin.  · You can start with ice, then switch to heat. Heat (hot shower, hot bath, or heating pad) reduces pain and works well for muscle spasms. Heat can be applied to the painful area for 20 minutes then remove it for 20 minutes. Do this over a period of 60 to 90 minutes or several times a day. Do not sleep on a heating pad. It can lead to skin burns or tissue damage.  · You can alternate ice and heat therapy. Talk with your doctor about the best treatment for your back pain.  · Therapeutic massage can help relax the back muscles without stretching them.  · Be aware of safe lifting methods and do not lift anything without stretching first.  Medicines  Talk to your doctor before using medicine, especially if you have other medical problems or are taking other medicines.  · You may use over-the-counter medicine as directed on the bottle to control pain, unless another pain medicine was prescribed. If you have chronic conditions like diabetes, liver or kidney disease, stomach ulcers, or gastrointestinal bleeding, or are taking blood thinners, talk to your doctor before taking any medicine.  · Be careful if you are given a prescription medicines, narcotics, or medicine for muscle spasms. They can cause drowsiness, affect your coordination, reflexes, and judgement. Do not drive or operate heavy machinery.  Follow-up care  Follow up with your healthcare provider, or as advised.   A radiologist will review any X-rays that were taken. Your provide will notify you of any new findings that may affect your care.  Call 911  Call emergency services if any of the following occur:  · Trouble breathing  · Confusion  · Very drowsy or trouble awakening  · Fainting or loss of consciousness  · Rapid or very slow heart rate  · Loss of bowel or bladder  control  When to seek medical advice  Call your healthcare provider right away if any of these occur:   · Pain becomes worse or spreads to your legs  · Weakness or numbness in one or both legs  · Numbness in the groin or genital area  Date Last Reviewed: 7/1/2016 © 2000-2016 [employer].. [address] [place] [address]. All rights reserved. This information is not intended as a substitute for professional medical care. Always follow your healthcare professional's instructions.

## 2017-04-27 NOTE — ED PROVIDER NOTES
Encounter Date: 4/27/2017       History     Chief Complaint   Patient presents with    Back Pain     Review of patient's allergies indicates:   Allergen Reactions    Aspirin Shortness Of Breath    Toradol [ketorolac] Shortness Of Breath    Toradol [ketorolac] Anaphylaxis    Aspirin     Codeine     Penicillins     Shellfish containing products     Sulfa (sulfonamide antibiotics)      HPI Comments: Mr Lemon reports he fell while trying to get into his window because he locked himself out of his house 2 days ago.  Reports bruising to his right thigh that is improving and is not causing any pain.  He also reports a flare of his chronic low back pain, worse than usual.  He did not hit his head, remembers all events, did not lose consciousness.  He reports he vomited dark vomit yesterday one time and is unsure why.  He denies any current abdominal pain, nausea or vomiting today.  He denies any chest pain or shortness breath at any time.  He takes Plavix for history of CVA and he walks with a cane.    The history is provided by the patient.     Past Medical History:   Diagnosis Date    Anxiety     Depression     Heart murmur     Hepatitis C     History of psychiatric hospitalization     5 previous hospitalizations.  Last was in 2015 at [facility]    HTN (hypertension) 11/6/2014    Hx of psychiatric care     Celexa, Zoloft, Seroquel     Psychiatric problem     depression and anxiety    Stroke     right side weakness    Therapy     reports last saw psychiatrist last year and he does not remember the name     Past Surgical History:   Procedure Laterality Date    APPENDECTOMY      FOOT SURGERY       Family History   Problem Relation Age of Onset    Drug abuse Mother     Alcohol abuse Father      Social History   Substance Use Topics    Smoking status: Current Every Day Smoker     Packs/day: 2.00     Years: 25.00     Types: Cigarettes    Smokeless tobacco: Not on file      Comment: patient ran out of  Chantix    Alcohol use Yes      Comment: Occasionally, last use was 3 days ago     Review of Systems   Constitutional: Negative.    Respiratory: Negative.    Cardiovascular: Negative.    Gastrointestinal: Negative for abdominal pain, blood in stool, constipation, diarrhea, nausea and vomiting.        Positive vomited with dark emesis times one episode yesterday.   Musculoskeletal: Positive for back pain. Negative for joint swelling, myalgias, neck pain and neck stiffness.   Skin: Negative.         Positive bruise to right inner thigh.   All other systems reviewed and are negative.      Physical Exam   Initial Vitals   BP Pulse Resp Temp SpO2   04/27/17 0701 04/27/17 0701 04/27/17 0701 04/27/17 0701 04/27/17 0701   125/87 73 16 98.8 °F (37.1 °C) 100 %     Physical Exam    Nursing note and vitals reviewed.  Constitutional: He appears well-developed and well-nourished.   HENT:   Head: Normocephalic and atraumatic.   Eyes: EOM are normal. Pupils are equal, round, and reactive to light.   Neck: Normal range of motion. Neck supple.   Cardiovascular: Normal rate and regular rhythm.   No murmur heard.  Pulmonary/Chest: Breath sounds normal. No respiratory distress. He has no wheezes. He has no rales.   Abdominal: Soft. He exhibits no distension and no mass. There is no tenderness. There is no rebound and no guarding.   Normal rectal tone.  Hemoccult negative.   Musculoskeletal: Normal range of motion. He exhibits no edema.   Positive tenderness to palpation and diffuse lower back.  Positive muscle spasms noted.  No vertebral point tenderness to palpation.  Normal range of motion.   Neurological: He is alert and oriented to person, place, and time.   Skin: Skin is warm and dry. No rash noted. No erythema.   Moderate size ecchymotic bruise noted to the right medial thigh with no surrounding abnormalities.  No tenderness to palpation or deformity noted.  No evidence of infection.   Psychiatric: He has a normal mood and  affect. His behavior is normal. Thought content normal.         ED Course   Procedures  Labs Reviewed - No data to display          Medical Decision Making:   Clinical Tests:   Radiological Study: Ordered and Reviewed  ED Management:  Mr Lemon feels better.  Is felt stable for discharge.  Wants to go home.  Will follow-up as needed.  We discussed that he probably shouldn't be climbing for Windows with his history and being on Plavix.  We discussed worrisome signs that should prompt need to return to the ER should they occur.  There is no indication for further emergent intervention or evaluation at this time.                   ED Course     Clinical Impression:   The primary encounter diagnosis was Fall, initial encounter. Diagnoses of Back pain, Back muscle spasm, and Medication refill were also pertinent to this visit.          Raza Sanchez MD  04/28/17 [id number]

## 2017-04-27 NOTE — ED NOTES
Reports abd pain and possible blood in the stool. Patient reports he is on Plavix his blood thinner since his stroke in 2014

## 2017-04-27 NOTE — ED AVS SNAPSHOT
[facility] EMERGENCY DEPARTMENT  [address] [place] [address]               Larry Ritchie Ion   2017  7:01 AM   ED    Description:  Male : 1965   Department:  [facility]           Your Care was Coordinated By:     Provider Role From To    Raza Sanchez MD Attending Provider 17 0710 --      Reason for Visit     Back Pain           Diagnoses this Visit        Comments    Fall, initial encounter    -  Primary     Back pain         Back muscle spasm         Medication refill           ED Disposition     ED Disposition Condition Comment    Discharge  Larry Ritchie Ion discharge to home/self care.    - Condition at discharge: Stable  - Mode of Discharge: by walking out            To Do List           Follow-up Information     Schedule an appointment as soon as possible for a visit with Maricruz Cloud MD.    Specialty:  Internal Medicine    Why:  to establish new primary care if desired    Contact information:    [address] [place] [address]  [phone]         These Medications        Disp Refills Start End    clopidogrel (PLAVIX) 75 mg tablet 30 tablet 0 2017     Take 1 tablet (75 mg total) by mouth once daily. - Oral    Pharmacy: [facility] - [place], [place] - [address] Ph #: [phone]       tramadol (ULTRAM) 50 mg tablet 10 tablet 0 2017    Take 1 tablet (50 mg total) by mouth every 8 (eight) hours as needed for Pain. - Oral    Pharmacy: [facility] - [place], [place] - [address] Ph #: [phone]       diazePAM (VALIUM) 5 MG tablet 10 tablet 0 2017    Take 1 tablet (5 mg total) by mouth every 6 (six) hours as needed (muscle spasm). - Oral    Pharmacy: [facility] - [place], [place] - [address] Ph #: [phone]         [facility] On [facility]     [facility] Nurse Care Line -   Assistance  Unless otherwise directed by your provider, please contact [facility], our nurse care line that is available for 24/7 assistance.     Registered nurses in the [facility] provide: appointment scheduling, clinical advisement, health education, and other advisory services.  Call: [phone] (toll free)               Medications           Message regarding Medications     Verify the changes and/or additions to your medication regime listed below are the same as discussed with your clinician today.  If any of these changes or additions are incorrect, please notify your healthcare provider.        START taking these NEW medications        Refills    tramadol (ULTRAM) 50 mg tablet 0    Sig: Take 1 tablet (50 mg total) by mouth every 8 (eight) hours as needed for Pain.    Class: Print    Route: Oral    diazePAM (VALIUM) 5 MG tablet 0    Sig: Take 1 tablet (5 mg total) by mouth every 6 (six) hours as needed (muscle spasm).    Class: Print    Route: Oral      These medications were administered today        Dose Freq    diazePAM tablet 5 mg 5 mg ED 1 Time    Sig: Take 1 tablet (5 mg total) by mouth ED 1 Time.    Class: Normal    Route: Oral           Verify that the below list of medications is an accurate representation of the medications you are currently taking.  If none reported, the list may be blank. If incorrect, please contact your healthcare provider. Carry this list with you in case of emergency.           Current Medications     lisinopril (PRINIVIL,ZESTRIL) 40 MG tablet Take 1 tablet (40 mg total) by mouth once daily.    simvastatin (ZOCOR) 40 MG tablet Take 1 tablet (40 mg total) by mouth every evening.    baclofen (LIORESAL) 10 MG tablet Take 1-2 Q HS for spasms    buPROPion (WELLBUTRIN XL) 150 MG TB24 tablet Take 1 tablet (150 mg total) by mouth once daily.    clopidogrel (PLAVIX) 75 mg tablet Take 1 tablet (75 mg total) by mouth once daily.    diazePAM (VALIUM) 5 MG tablet Take  "1 tablet (5 mg total) by mouth every 6 (six) hours as needed (muscle spasm).    diazePAM tablet 5 mg Take 1 tablet (5 mg total) by mouth ED 1 Time.    hydrocodone-acetaminophen 5-325mg (NORCO) 5-325 mg per tablet Take 1 tablet by mouth every 6 (six) hours as needed for Pain.    mirtazapine (REMERON SOL-TAB) 30 MG disintegrating tablet Take 1 tablet (30 mg total) by mouth nightly.    tamsulosin (FLOMAX) 0.4 mg Cp24 Take 1 capsule (0.4 mg total) by mouth once daily.    tramadol (ULTRAM) 50 mg tablet Take 1 tablet (50 mg total) by mouth every 8 (eight) hours as needed for Pain.    varenicline (CHANTIX) 1 mg Tab Take 1 tablet (1 mg total) by mouth 2 (two) times daily.           Clinical Reference Information           Your Vitals Were     BP Pulse Temp Resp Height Weight    125/87 (BP Location: Left arm, Patient Position: Sitting) 73 98.8 °F (37.1 °C) (Skin) 16 5' 2" (1.575 m) 74.8 kg (165 lb)    SpO2 BMI             100% 30.18 kg/m2         Allergies as of 4/27/2017        Reactions    Aspirin Shortness Of Breath    Toradol [Ketorolac] Shortness Of Breath    Toradol [Ketorolac] Anaphylaxis    Aspirin     Codeine     Penicillins     Shellfish Containing Products     Sulfa (Sulfonamide Antibiotics)       Immunizations Administered on Date of Encounter - 4/27/2017     None      ED Micro, Lab, POCT     None      ED Imaging Orders     Start Ordered       Status Ordering Provider    04/27/17 0720 04/27/17 0719  X-Ray Lumbar Spine Ap And Lateral  1 time imaging      Final result         Discharge Instructions         Back Care Tips    Caring for your back  These are things you can do to prevent a recurrence of acute back pain and to reduce symptoms from chronic back pain:  · Maintain a healthy weight. If you are overweight, losing weight will help most types of back pain.  · Exercise is an important part of recovery from most types of back pain. The muscles behind and in front of the spine support the back. This means " strengthening both the back muscles and the abdominal muscles will provide better support for your spine.   · Swimming and brisk walking are good overall exercises to improve your fitness level.  · Practice safe lifting methods (below).  · Practice good posture when sitting, standing and walking. Avoid prolonged sitting. This puts more stress on the lower back than standing or walking.  · Wear quality shoes with sufficient arch support. Foot and ankle alignment can affect back symptoms. Women should avoid wearing high heels.  · Therapeutic massage can help relax the back muscles without stretching them.  · During the first 24 to 72 hours after an acute injury or flare-up of chronic back pain, apply an ice pack to the painful area for 20 minutes and then remove it for 20 minutes, over a period of 60 to 90 minutes, or several times a day. As a safety precaution, do not use a heating pad at bedtime. Sleeping on a heating pad can lead to skin burns or tissue damage.  · You can alternate ice and heat therapies.  Medications  Talk to your healthcare provider before using medicines, especially if you have other medical problems or are taking other medicines.  · You may use acetaminophen or ibuprofen to control pain, unless your healthcare provider prescribed other pain medicine. If you have chronic conditions like diabetes, liver or kidney disease, stomach ulcers, or gastrointestinal bleeding, or are taking blood thinners, talk with your healthcare provider before taking any medicines.  · Be careful if you are given prescription pain medicines, narcotics, or medicine for muscle spasm. They can cause drowsiness, affect your coordination, reflexes, and judgment. Do not drive or operate heavy machinery while taking these types of medicines. Take prescription pain medicine only as prescribed by your healthcare provider.  Lumbar stretch  Here is a simple stretching exercise that will help relax muscle spasm and keep your back  more limber. If exercise makes your back pain worse, dont do it.  · Lie on your back with your knees bent and both feet on the ground.  · Slowly raise your left knee to your chest as you flatten your lower back against the floor. Hold for 5 seconds.  · Relax and repeat the exercise with your right knee.  · Do 10 of these exercises for each leg.  Safe lifting method  · Dont bend over at the waist to lift an object off the floor.  Instead, bend your knees and hips in a squat.   · Keep your back and head upright  · Hold the object close to your body, directly in front of you.  · Straighten your legs to lift the object.   · Lower the object to the floor in the reverse fashion.  · If you must slide something across the floor, push it.  Posture tips  Sitting  Sit in chairs with straight backs or low-back support. Keep your knees lower than your hips, with your feet flat on the floor.  When driving, sit up straight. Adjust the seat forward so you are not leaning toward the steering wheel.  A small pillow or rolled towel behind your lower back may help if you are driving long distances.   Standing  When standing for long periods, shift most of your weight to one leg at a time. Alternate legs every few minutes.   Sleeping  The best way to sleep is on your side with your knees bent. Put a low pillow under your head to support your neck in a neutral spine position. Avoid thick pillows that bend your neck to one side. Put a pillow between your legs to further relax your lower back. If you sleep on your back, put pillows under your knees to support your legs in a slightly flexed position. Use a firm mattress. If your mattress sags, replace it, or use a 1/2-inch plywood board under the mattress to add support.  Follow-up care  Follow up with your healthcare provider, or as advised.  If X-rays, a CT scan or an MRI scan were taken, they will be reviewed by a radiologist. You will be notified of any new findings that may affect  your care.  Call 911  Seek emergency medical care if any of the following occur:  · Trouble breathing  · Confusion  · Very drowsy  · Fainting or loss of consciousness  · Rapid or very slow heart rate  · Loss of  bowel or bladder control  When to seek medical care  Call your healthcare provider if any of the following occur:  · Pain becomes worse or spreads to your arms or legs  · Weakness or numbness in one or both arms or legs  · Numbness in the groin area  Date Last Reviewed: 6/1/2016 © 2000-2016 [employer]. [address] [place] [address]. All rights reserved. This information is not intended as a substitute for professional medical care. Always follow your healthcare professional's instructions.          Back Pain (Acute or Chronic)    Back pain is one of the most common problems. The good news is that most people feel better in 1 to 2 weeks, and most of the rest in 1 to 2 months. Most people can remain active.  People experience and describe pain differently; not everyone is the same.  · The pain can be sharp, stabbing, shooting, aching, cramping or burning.  · Movement, standing, bending, lifting, sitting, or walking may worsen pain.  · It can be localized to one spot or area, or it can be more generalized.  · It can spread or radiate upwards, to the front, or go down your arms or legs (sciatica).  · It can cause muscle spasm.  Most of the time, mechanical problems with the muscles or spine cause the pain. Mechanical problems are usually caused by an injury to the muscles or ligaments. While illness can cause back pain, it is usually not caused by a serious illness. Mechanical problems include:   · Physical activity such as sports, exercise, work, or normal activity  · Overexertion, lifting, pushing, pulling incorrectly or too aggressively  · Sudden twisting, bending, or stretching from an accident, or accidental movement  · Poor posture  · Stretching or moving wrong, without noticing  pain at the time  · Poor coordination, lack of regular exercise (check with your doctor about this)  · Spinal disc disease or arthritis  · Stress  Pain can also be related to pregnancy, or illness like appendicitis, bladder or kidney infections, pelvic infections, and many other things.  Acute back pain usually gets better in 1 to 2 weeks. Back pain related to disk disease, arthritis in the spinal joints or spinal stenosis (narrowing of the spinal canal) can become chronic and last for months or years.  Unless you had a physical injury (for example, a car accident or fall) X-rays are usually not needed for the initial evaluation of back pain. If pain continues and does not respond to medical treatment, X-rays and other tests may be needed.  Home care  Try these home care recommendations:  · When in bed, try to find a position of comfort. A firm mattress is best. Try lying flat on your back with pillows under your knees. You can also try lying on your side with your knees bent up towards your chest and a pillow between your knees.  · At first, do not try to stretch out the sore spots. If there is a strain, it is not like the good soreness you get after exercising without an injury. In this case, stretching may make it worse.  · Avoid prolong sitting, long car rides, or travel. This puts more stress on the lower back than standing or walking.  · During the first 24 to 72 hours after an acute injury or flare up of chronic back pain, apply an ice pack to the painful area for 20 minutes and then remove it for 20 minutes. Do this over a period of 60 to 90 minutes or several times a day. This will reduce swelling and pain. Wrap the ice pack in a thin towel or plastic to protect your skin.  · You can start with ice, then switch to heat. Heat (hot shower, hot bath, or heating pad) reduces pain and works well for muscle spasms. Heat can be applied to the painful area for 20 minutes then remove it for 20 minutes. Do this over  a period of 60 to 90 minutes or several times a day. Do not sleep on a heating pad. It can lead to skin burns or tissue damage.  · You can alternate ice and heat therapy. Talk with your doctor about the best treatment for your back pain.  · Therapeutic massage can help relax the back muscles without stretching them.  · Be aware of safe lifting methods and do not lift anything without stretching first.  Medicines  Talk to your doctor before using medicine, especially if you have other medical problems or are taking other medicines.  · You may use over-the-counter medicine as directed on the bottle to control pain, unless another pain medicine was prescribed. If you have chronic conditions like diabetes, liver or kidney disease, stomach ulcers, or gastrointestinal bleeding, or are taking blood thinners, talk to your doctor before taking any medicine.  · Be careful if you are given a prescription medicines, narcotics, or medicine for muscle spasms. They can cause drowsiness, affect your coordination, reflexes, and judgement. Do not drive or operate heavy machinery.  Follow-up care  Follow up with your healthcare provider, or as advised.   A radiologist will review any X-rays that were taken. Your provide will notify you of any new findings that may affect your care.  Call 911  Call emergency services if any of the following occur:  · Trouble breathing  · Confusion  · Very drowsy or trouble awakening  · Fainting or loss of consciousness  · Rapid or very slow heart rate  · Loss of bowel or bladder control  When to seek medical advice  Call your healthcare provider right away if any of these occur:   · Pain becomes worse or spreads to your legs  · Weakness or numbness in one or both legs  · Numbness in the groin or genital area  Date Last Reviewed: 7/1/2016 © 2000-2016 [employer]. [address] [place] [address], [place] [address]. All rights reserved. This information is not intended as a substitute for  professional medical care. Always follow your healthcare professional's instructions.          Discharge References/Attachments     FALLS, PREVENTING, EXERCISES TO IMPROVE BALANCE, FLEXIBILITY, STRENGTH, AND STAYING POWER (ENGLISH)      [facility] Sign-Up     Activating your [facility] account is as easy as 1-2-3!     1) Visit [email or web address], select Sign Up Now, enter this activation code and your date of birth, then select Next.  [email or web address]  Expires: 6/11/2017  8:09 AM      2) Create a username and password to use when you visit [facility] in the future and select a security question in case you lose your password and select Next.    3) Enter your e-mail address and click Sign Up!    Additional Information  If you have questions, please e-mail [email or web address] or call [phone] to talk to our [facility] staff. Remember, [facility] is NOT to be used for urgent needs. For medical emergencies, dial 911.         Smoking Cessation     If you would like to quit smoking:   You may be eligible for free services if you are a [place] resident and started smoking cigarettes before September 1, 1988.  Call the [employer]oking [employer]essation [employer] ([employer]) toll free at (525) 982-3736 or (101) 451-1696.   Call 1-800-QUIT-NOW if you do not meet the above criteria.   Contact us via email: [email or web address]   View our website for more information: [email or web address]         [facility] complies with applicable Federal civil rights laws and does not discriminate on the basis of race, color, national origin, age, disability, or sex.        Language Assistance Services     ATTENTION: Language assistance services are available, free of charge. Please call [phone].      ATENCIÓN: Si habla jayce, tiene a alves disposición servicios gratuitos de asistencia lingüística. Llame al [phone].     CHÚ Ý: N?u b?n nói Ti?ng Vi?t, có các d?ch v? h? tr? ngôn ng? mi?n phí dành cho b?n. G?i s?  [phone].

## 2017-05-11 ENCOUNTER — HOSPITAL ENCOUNTER (OUTPATIENT)
Facility: HOSPITAL | Age: 52
Discharge: HOME | DRG: 392 | End: 2017-05-15
Attending: EMERGENCY MEDICINE | Admitting: INTERNAL MEDICINE
Payer: MEDICAID

## 2017-05-11 DIAGNOSIS — R10.9 ABDOMINAL PAIN, UNSPECIFIED LOCATION: ICD-10-CM

## 2017-05-11 DIAGNOSIS — R07.89 CHEST DISCOMFORT: ICD-10-CM

## 2017-05-11 DIAGNOSIS — K57.30 DIVERTICULOSIS OF LARGE INTESTINE WITHOUT HEMORRHAGE: Primary | ICD-10-CM

## 2017-05-11 LAB
ALBUMIN SERPL BCP-MCNC: 3.6 G/DL
ALP SERPL-CCNC: 53 U/L
ALT SERPL W/O P-5'-P-CCNC: 20 U/L
ANION GAP SERPL CALC-SCNC: 11 MMOL/L
AST SERPL-CCNC: 17 U/L
BASOPHILS # BLD AUTO: 0.05 K/UL
BASOPHILS NFR BLD: 0.3 %
BILIRUB SERPL-MCNC: 0.6 MG/DL
BILIRUB UR QL STRIP: NEGATIVE
BUN SERPL-MCNC: 12 MG/DL
CALCIUM SERPL-MCNC: 9 MG/DL
CHLORIDE SERPL-SCNC: 101 MMOL/L
CLARITY UR REFRACT.AUTO: CLEAR
CO2 SERPL-SCNC: 23 MMOL/L
COLOR UR AUTO: YELLOW
CREAT SERPL-MCNC: 0.9 MG/DL
DIFFERENTIAL METHOD: ABNORMAL
EOSINOPHIL # BLD AUTO: 0 K/UL
EOSINOPHIL NFR BLD: 0.2 %
ERYTHROCYTE [DISTWIDTH] IN BLOOD BY AUTOMATED COUNT: 15.3 %
EST. GFR  (AFRICAN AMERICAN): >60 ML/MIN/1.73 M^2
EST. GFR  (NON AFRICAN AMERICAN): >60 ML/MIN/1.73 M^2
GLUCOSE SERPL-MCNC: 110 MG/DL
GLUCOSE UR QL STRIP: NEGATIVE
HCT VFR BLD AUTO: 42.1 %
HGB BLD-MCNC: 13.9 G/DL
HGB UR QL STRIP: NEGATIVE
KETONES UR QL STRIP: ABNORMAL
LEUKOCYTE ESTERASE UR QL STRIP: NEGATIVE
LIPASE SERPL-CCNC: 32 U/L
LYMPHOCYTES # BLD AUTO: 2.9 K/UL
LYMPHOCYTES NFR BLD: 14.8 %
MCH RBC QN AUTO: 25.5 PG
MCHC RBC AUTO-ENTMCNC: 33 %
MCV RBC AUTO: 77 FL
MONOCYTES # BLD AUTO: 1.5 K/UL
MONOCYTES NFR BLD: 7.7 %
NEUTROPHILS # BLD AUTO: 14.9 K/UL
NEUTROPHILS NFR BLD: 76.6 %
NITRITE UR QL STRIP: NEGATIVE
PH UR STRIP: 8 [PH] (ref 5–8)
PLATELET # BLD AUTO: 414 K/UL
PMV BLD AUTO: 9.1 FL
POTASSIUM SERPL-SCNC: 3.8 MMOL/L
PROT SERPL-MCNC: 7.5 G/DL
PROT UR QL STRIP: NEGATIVE
RBC # BLD AUTO: 5.46 M/UL
SODIUM SERPL-SCNC: 135 MMOL/L
SP GR UR STRIP: >1.03 (ref 1–1.03)
TROPONIN I SERPL DL<=0.01 NG/ML-MCNC: <0.006 NG/ML
URN SPEC COLLECT METH UR: ABNORMAL
UROBILINOGEN UR STRIP-ACNC: NEGATIVE EU/DL
WBC # BLD AUTO: 19.47 K/UL

## 2017-05-11 PROCEDURE — 83690 ASSAY OF LIPASE: CPT

## 2017-05-11 PROCEDURE — 85025 COMPLETE CBC W/AUTO DIFF WBC: CPT

## 2017-05-11 PROCEDURE — 25000003 PHARM REV CODE 250: Performed by: PHYSICIAN ASSISTANT

## 2017-05-11 PROCEDURE — 93005 ELECTROCARDIOGRAM TRACING: CPT

## 2017-05-11 PROCEDURE — 63600175 PHARM REV CODE 636 W HCPCS: Performed by: STUDENT IN AN ORGANIZED HEALTH CARE EDUCATION/TRAINING PROGRAM

## 2017-05-11 PROCEDURE — 25000003 PHARM REV CODE 250: Performed by: STUDENT IN AN ORGANIZED HEALTH CARE EDUCATION/TRAINING PROGRAM

## 2017-05-11 PROCEDURE — 96361 HYDRATE IV INFUSION ADD-ON: CPT

## 2017-05-11 PROCEDURE — 25000003 PHARM REV CODE 250: Performed by: INTERNAL MEDICINE

## 2017-05-11 PROCEDURE — G0378 HOSPITAL OBSERVATION PER HR: HCPCS

## 2017-05-11 PROCEDURE — 25500020 PHARM REV CODE 255: Performed by: EMERGENCY MEDICINE

## 2017-05-11 PROCEDURE — 63600175 PHARM REV CODE 636 W HCPCS: Performed by: PHYSICIAN ASSISTANT

## 2017-05-11 PROCEDURE — 63600175 PHARM REV CODE 636 W HCPCS: Performed by: INTERNAL MEDICINE

## 2017-05-11 PROCEDURE — 80053 COMPREHEN METABOLIC PANEL: CPT

## 2017-05-11 PROCEDURE — 96366 THER/PROPH/DIAG IV INF ADDON: CPT

## 2017-05-11 PROCEDURE — 96367 TX/PROPH/DG ADDL SEQ IV INF: CPT

## 2017-05-11 PROCEDURE — 93010 ELECTROCARDIOGRAM REPORT: CPT | Mod: ,,, | Performed by: INTERNAL MEDICINE

## 2017-05-11 PROCEDURE — 96375 TX/PRO/DX INJ NEW DRUG ADDON: CPT

## 2017-05-11 PROCEDURE — 99285 EMERGENCY DEPT VISIT HI MDM: CPT | Mod: 25

## 2017-05-11 PROCEDURE — 99223 1ST HOSP IP/OBS HIGH 75: CPT | Mod: ,,, | Performed by: INTERNAL MEDICINE

## 2017-05-11 PROCEDURE — 96365 THER/PROPH/DIAG IV INF INIT: CPT

## 2017-05-11 PROCEDURE — 84484 ASSAY OF TROPONIN QUANT: CPT

## 2017-05-11 PROCEDURE — 81003 URINALYSIS AUTO W/O SCOPE: CPT

## 2017-05-11 RX ORDER — CIPROFLOXACIN 2 MG/ML
400 INJECTION, SOLUTION INTRAVENOUS
Status: COMPLETED | OUTPATIENT
Start: 2017-05-11 | End: 2017-05-11

## 2017-05-11 RX ORDER — CIPROFLOXACIN 2 MG/ML
400 INJECTION, SOLUTION INTRAVENOUS
Status: DISCONTINUED | OUTPATIENT
Start: 2017-05-11 | End: 2017-05-15 | Stop reason: HOSPADM

## 2017-05-11 RX ORDER — ACETAMINOPHEN 325 MG/1
650 TABLET ORAL
Status: DISPENSED | OUTPATIENT
Start: 2017-05-11 | End: 2017-05-11

## 2017-05-11 RX ORDER — METRONIDAZOLE 500 MG/100ML
500 INJECTION, SOLUTION INTRAVENOUS
Status: DISCONTINUED | OUTPATIENT
Start: 2017-05-11 | End: 2017-05-13

## 2017-05-11 RX ORDER — CLOPIDOGREL BISULFATE 75 MG/1
75 TABLET ORAL DAILY
Status: DISCONTINUED | OUTPATIENT
Start: 2017-05-11 | End: 2017-05-15 | Stop reason: HOSPADM

## 2017-05-11 RX ORDER — METRONIDAZOLE 500 MG/100ML
500 INJECTION, SOLUTION INTRAVENOUS
Status: COMPLETED | OUTPATIENT
Start: 2017-05-11 | End: 2017-05-11

## 2017-05-11 RX ORDER — DIAZEPAM 5 MG/1
5 TABLET ORAL EVERY 6 HOURS PRN
Status: DISCONTINUED | OUTPATIENT
Start: 2017-05-11 | End: 2017-05-15 | Stop reason: HOSPADM

## 2017-05-11 RX ORDER — OXYCODONE HYDROCHLORIDE 5 MG/1
5 TABLET ORAL
Status: DISCONTINUED | OUTPATIENT
Start: 2017-05-11 | End: 2017-05-11

## 2017-05-11 RX ORDER — LISINOPRIL 20 MG/1
40 TABLET ORAL DAILY
Status: DISCONTINUED | OUTPATIENT
Start: 2017-05-11 | End: 2017-05-15 | Stop reason: HOSPADM

## 2017-05-11 RX ORDER — METOCLOPRAMIDE HYDROCHLORIDE 5 MG/ML
10 INJECTION INTRAMUSCULAR; INTRAVENOUS
Status: COMPLETED | OUTPATIENT
Start: 2017-05-11 | End: 2017-05-11

## 2017-05-11 RX ORDER — BUPROPION HYDROCHLORIDE 150 MG/1
150 TABLET ORAL DAILY
Status: DISCONTINUED | OUTPATIENT
Start: 2017-05-11 | End: 2017-05-15 | Stop reason: HOSPADM

## 2017-05-11 RX ORDER — HYDROMORPHONE HYDROCHLORIDE 1 MG/ML
0.5 INJECTION, SOLUTION INTRAMUSCULAR; INTRAVENOUS; SUBCUTANEOUS EVERY 6 HOURS PRN
Status: DISCONTINUED | OUTPATIENT
Start: 2017-05-11 | End: 2017-05-13

## 2017-05-11 RX ORDER — SODIUM CHLORIDE 9 MG/ML
1000 INJECTION, SOLUTION INTRAVENOUS CONTINUOUS
Status: ACTIVE | OUTPATIENT
Start: 2017-05-11 | End: 2017-05-11

## 2017-05-11 RX ADMIN — METOCLOPRAMIDE 10 MG: 5 INJECTION, SOLUTION INTRAMUSCULAR; INTRAVENOUS at 04:05

## 2017-05-11 RX ADMIN — IOHEXOL 75 ML: 350 INJECTION, SOLUTION INTRAVENOUS at 06:05

## 2017-05-11 RX ADMIN — CIPROFLOXACIN 400 MG: 2 INJECTION, SOLUTION INTRAVENOUS at 11:05

## 2017-05-11 RX ADMIN — SODIUM CHLORIDE 1000 ML: 0.9 INJECTION, SOLUTION INTRAVENOUS at 12:05

## 2017-05-11 RX ADMIN — METRONIDAZOLE 500 MG: 500 INJECTION, SOLUTION INTRAVENOUS at 08:05

## 2017-05-11 RX ADMIN — SODIUM CHLORIDE 1000 ML: 0.9 INJECTION, SOLUTION INTRAVENOUS at 09:05

## 2017-05-11 RX ADMIN — HYDROMORPHONE HYDROCHLORIDE 0.5 MG: 1 INJECTION, SOLUTION INTRAMUSCULAR; INTRAVENOUS; SUBCUTANEOUS at 05:05

## 2017-05-11 RX ADMIN — CIPROFLOXACIN 400 MG: 2 INJECTION, SOLUTION INTRAVENOUS at 09:05

## 2017-05-11 RX ADMIN — ALUMINUM HYDROXIDE, MAGNESIUM HYDROXIDE, AND SIMETHICONE 50 ML: 200; 200; 20 SUSPENSION ORAL at 04:05

## 2017-05-11 RX ADMIN — SODIUM CHLORIDE 1000 ML: 0.9 INJECTION, SOLUTION INTRAVENOUS at 06:05

## 2017-05-11 RX ADMIN — LISINOPRIL 40 MG: 20 TABLET ORAL at 12:05

## 2017-05-11 RX ADMIN — HYDROMORPHONE HYDROCHLORIDE 0.5 MG: 1 INJECTION, SOLUTION INTRAMUSCULAR; INTRAVENOUS; SUBCUTANEOUS at 11:05

## 2017-05-11 RX ADMIN — CLOPIDOGREL 75 MG: 75 TABLET, FILM COATED ORAL at 12:05

## 2017-05-11 RX ADMIN — DIAZEPAM 5 MG: 5 TABLET ORAL at 03:05

## 2017-05-11 RX ADMIN — METRONIDAZOLE 500 MG: 500 INJECTION, SOLUTION INTRAVENOUS at 03:05

## 2017-05-11 RX ADMIN — DIAZEPAM 5 MG: 5 TABLET ORAL at 09:05

## 2017-05-11 RX ADMIN — BUPROPION HYDROCHLORIDE 150 MG: 150 TABLET, FILM COATED, EXTENDED RELEASE ORAL at 12:05

## 2017-05-11 RX ADMIN — METRONIDAZOLE 500 MG: 500 INJECTION, SOLUTION INTRAVENOUS at 11:05

## 2017-05-11 NOTE — ED NOTES
Pt aware need for urine sample. Pt sates he will call nurse when needing to void. Urinal at bedside. Pt given PO glass of water, tolerated well. Call light within reach. Will continue to monitor.

## 2017-05-11 NOTE — ED NOTES
Diet tray ordered for pt. Pt updated on current plan of care to be admitted. No further questions or concerns at this time.

## 2017-05-11 NOTE — ED NOTES
Two patient identifiers have been checked and are correct.    Pt denies chest pain or SOB at this time. Pt denies pain or needs currently.   Appearance: Pt awake, alert & oriented to person, place & time. Pt in no acute distress at present time. Pt is clean and well groomed with clothes appropriately fastened.   Skin: Skin warm, dry & intact. Color consistent with ethnicity. Mucous membranes moist. No breakdown or brusing noted.   Musculoskeletal: Patient moving all extremities well, no obvious swelling or deformities noted.   Respiratory: Respirations spontaneous, even, and non-labored. Visible chest rise noted. Airway is open and patent. No accessory muscle use noted.   Neurologic: Sensation is intact. Speech is clear and appropriate. Eyes open spontaneously, behavior appropriate to situation, follows commands, facial expression symmetrical, bilateral hand grasp equal and even, purposeful motor response noted.  Cardiac: All peripheral pulses present. No Bilateral lower extremity edema. Cap refill is <3 seconds.  Abdomen: Abdomen soft, non-tender to palpation. Pt denies abdominal pain, nausea or discomfort at this time.   : Pt reports no dysuria or hematuria.     IV remains patent w/ fluids infusing per order. Pt remains on cardiac monitor, continuous pulse oximetry and automatic blood pressure cuff cycling w/ alarms set. Bed placed in low locked position, side rails up x 2, call light is within reach of patient or family, alarms set and turned on for monitor and pulse ox, will continue to monitor.

## 2017-05-11 NOTE — ED PROVIDER NOTES
Encounter Date: 5/11/2017    SCRIBE #1 NOTE: I, Jasmin Mckeon, am scribing for, and in the presence of,  Dr. Bloom. I have scribed the following portions of the note - the EKG reading and the APC attestation. Other sections scribed: X-ray.       History     Chief Complaint   Patient presents with    Nausea     N/V x3 days, hx of CVA with right sided deficits     Hematemesis     Review of patient's allergies indicates:   Allergen Reactions    Aspirin Shortness Of Breath    Toradol [ketorolac] Shortness Of Breath    Toradol [ketorolac] Anaphylaxis    Aspirin     Codeine     Penicillins     Shellfish containing products     Sulfa (sulfonamide antibiotics)      HPI Comments: 51-year-old male presents to the ER with the chief complaint of nausea, vomiting, chest discomfort and R sided abdominal pain.  Symptoms ×2 days.  He denies any fever or chills. He has no change in bowel or bladder.    His chest pain is nonexertional.  His chest pain is nonradiating.  He denies any shortness of breath.  The history is provided by the patient.     Past Medical History:   Diagnosis Date    Anxiety     Depression     Heart murmur     Hepatitis C     History of psychiatric hospitalization     5 previous hospitalizations.  Last was in 2015 at [facility]    HTN (hypertension) 11/6/2014    Hx of psychiatric care     Celexa, Zoloft, Seroquel     Psychiatric problem     depression and anxiety    Stroke     right side weakness    Therapy     reports last saw psychiatrist last year and he does not remember the name     Past Surgical History:   Procedure Laterality Date    APPENDECTOMY      FOOT SURGERY       Family History   Problem Relation Age of Onset    Drug abuse Mother     Alcohol abuse Father      Social History   Substance Use Topics    Smoking status: Current Every Day Smoker     Packs/day: 2.00     Years: 25.00     Types: Cigarettes    Smokeless tobacco: None      Comment: patient ran out of Chantix    Alcohol  use Yes      Comment: Occasionally, last use was 3 days ago     Review of Systems   Constitutional: Negative for fever.   HENT: Negative for sore throat.    Respiratory: Negative for shortness of breath.    Cardiovascular: Positive for chest pain.   Gastrointestinal: Positive for nausea and vomiting.   Genitourinary: Negative for dysuria.   Musculoskeletal: Negative for back pain.   Skin: Negative for rash.   Neurological: Negative for weakness.   Hematological: Does not bruise/bleed easily.       Physical Exam   Initial Vitals   BP Pulse Resp Temp SpO2   05/11/17 0222 05/11/17 0222 05/11/17 0222 05/11/17 0222 05/11/17 0222   179/104 92 16 97.8 °F (36.6 °C) 97 %     Physical Exam    Constitutional: Vital signs are normal. He appears well-developed and well-nourished.   HENT:   Head: Normocephalic and atraumatic.   Eyes: Conjunctivae are normal.   Cardiovascular: Normal rate and regular rhythm. Exam reveals no gallop and no friction rub.    No murmur heard.  Pulmonary/Chest: No respiratory distress. He has no wheezes. He has no rhonchi. He has no rales. He exhibits tenderness.   Abdominal: Soft. Normal appearance, normal aorta and bowel sounds are normal. There is negative Marie's sign. No hernia.   TTP RLQ, no rebound or guarding   Musculoskeletal: Normal range of motion.   Neurological: He is alert and oriented to person, place, and time.   Skin: Skin is warm and intact.   Psychiatric: He has a normal mood and affect. His speech is normal and behavior is normal. Cognition and memory are normal.         ED Course   Procedures  Labs Reviewed   CBC W/ AUTO DIFFERENTIAL - Abnormal; Notable for the following:        Result Value    WBC 19.47 (*)     Hemoglobin 13.9 (*)     MCV 77 (*)     MCH 25.5 (*)     RDW 15.3 (*)     Platelets 414 (*)     MPV 9.1 (*)     Gran # 14.9 (*)     Mono # 1.5 (*)     Gran% 76.6 (*)     Lymph% 14.8 (*)     All other components within normal limits   URINALYSIS - Abnormal; Notable for the  following:     Specific Gravity, UA >1.030 (*)     Ketones, UA Trace (*)     All other components within normal limits   COMPREHENSIVE METABOLIC PANEL - Abnormal; Notable for the following:     Sodium 135 (*)     Alkaline Phosphatase 53 (*)     All other components within normal limits   TROPONIN I   LIPASE     EKG Readings: (Independently Interpreted)   Normal sinus rhythm. No ST elevation or depression.        X-Rays:   Independently Interpreted Readings:   Chest X-Ray: No acute process.      Medical Decision Making:   History:   Old Medical Records: I decided to obtain old medical records.  Independently Interpreted Test(s):   I have ordered and independently interpreted X-rays - see prior notes.  I have ordered and independently interpreted EKG Reading(s) - see prior notes  Clinical Tests:   Lab Tests: Reviewed and Ordered  Radiological Study: Ordered  Medical Tests: Ordered  ED Management:  Pt has leukocytosis with left shift. His cardiac work up is negative thus far and his CP has resolved. His nausea is gone but he still has abdominal pain, more focal to the R abdomen. He has had an appendectomy.   Other:   I discussed test(s) with the performing physician.       <> Summary of the Findings: CT abd: nad  I have discussed this case with another health care provider.       <> Summary of the Discussion: IM  8:24 AM  Patient's CT shows sigmoid diverticulosis without any signs of inflammation or abdominal abscess.   Given patient is requiring IVF and not tolerating PO diet as well as his leukocytosis of 20 and ongoing pain, will admit to obs status for IV Abx for acute diverticulitis.   Will give tylenol and allow patient to have clear fluid diet as tolerated. Continue IVF.     Admit to IM O.             Scribe Attestation:   Scribe #1: I performed the above scribed service and the documentation accurately describes the services I performed. I attest to the accuracy of the note.    Attending Attestation:      Physician Attestation Statement for NP/PA:   I discussed this assessment and plan of this patient with the NP/PA, but I did not personally examine the patient. The face to face encounter was performed by the NP/PA.    Other NP/PA Attestation Additions:    History of Present Illness: Nausea and vomiting.          Physician Attestation for Scribe:  Physician Attestation Statement for Scribe #1: I, Dr. Bloom, reviewed documentation, as scribed by Jasmin Mckeon in my presence, and it is both accurate and complete.                 ED Course     Clinical Impression:   The primary encounter diagnosis was Diverticulosis of large intestine without hemorrhage. A diagnosis of Chest discomfort was also pertinent to this visit.    Disposition:   Disposition: Placed in Observation       Jennifer Givens MD  Resident  05/11/17 0990       Dontrell Bloom III, MD  05/11/17 4626       Dontrell Bloom III, MD  05/11/17 9767

## 2017-05-11 NOTE — ED TRIAGE NOTES
Pt presents to ED c/o nausea, vomiting and hematemesis x3 days. Pt stated that he noticed a blood clot in vomit today and came to the ED. Pt denies diarrhea, CP, SOB.     LOC: Patient name and date of birth verified.  The patient is awake, alert and aware of environment with an appropriate affect, the patient is oriented x 3 and speaking appropriately.  Pt in NAD.    APPEARANCE: Patient resting comfortably and in no acute distress, patient is clean and well groomed, patient's clothing is properly fastened.  SKIN: The skin is warm and dry, color consistent with ethnicity, patient has normal skin turgor and moist mucus membranes, skin intact, no breakdown or brusing noted.  MUSCULOSKELETAL: Patient moving all extremities well, no obvious swelling or deformities noted.  RESPIRATORY: Airway is open and patent, respirations are spontaneous, patient has a normal effort and rate, no accessory muscle use noted.  CARDIAC: Patient has a normal rate and rhythm, no periphreal edema noted, capillary refill < 3 seconds.  ABDOMEN: Soft and non tender to palpation, no distention noted. Bowel sounds present in all four quadrants.  NEUROLOGIC: Eyes open spontaneously, behavior appropriate to situation, follows commands, facial expression symmetrical, bilateral hand grasp equal and even, purposeful motor response noted, normal sensation in all extremities when touched with a finger.

## 2017-05-11 NOTE — SUBJECTIVE & OBJECTIVE
Past Medical History:   Diagnosis Date    Anxiety     Depression     Heart murmur     Hepatitis C     History of psychiatric hospitalization     5 previous hospitalizations.  Last was in 2015 at [facility]    HTN (hypertension) 11/6/2014    Hx of psychiatric care     Celexa, Zoloft, Seroquel     Psychiatric problem     depression and anxiety    Stroke     right side weakness    Therapy     reports last saw psychiatrist last year and he does not remember the name       Past Surgical History:   Procedure Laterality Date    APPENDECTOMY      FOOT SURGERY         Review of patient's allergies indicates:   Allergen Reactions    Aspirin Shortness Of Breath    Toradol [ketorolac] Shortness Of Breath    Toradol [ketorolac] Anaphylaxis    Aspirin     Codeine     Penicillins     Shellfish containing products     Sulfa (sulfonamide antibiotics)        No current facility-administered medications on file prior to encounter.      Current Outpatient Prescriptions on File Prior to Encounter   Medication Sig    baclofen (LIORESAL) 10 MG tablet Take 1-2 Q HS for spasms    buPROPion (WELLBUTRIN XL) 150 MG TB24 tablet Take 1 tablet (150 mg total) by mouth once daily.    clopidogrel (PLAVIX) 75 mg tablet Take 1 tablet (75 mg total) by mouth once daily.    diazePAM (VALIUM) 5 MG tablet Take 1 tablet (5 mg total) by mouth every 6 (six) hours as needed (muscle spasm).    hydrocodone-acetaminophen 5-325mg (NORCO) 5-325 mg per tablet Take 1 tablet by mouth every 6 (six) hours as needed for Pain.    lisinopril (PRINIVIL,ZESTRIL) 40 MG tablet Take 1 tablet (40 mg total) by mouth once daily.    mirtazapine (REMERON SOL-TAB) 30 MG disintegrating tablet Take 1 tablet (30 mg total) by mouth nightly.    simvastatin (ZOCOR) 40 MG tablet Take 1 tablet (40 mg total) by mouth every evening.    tamsulosin (FLOMAX) 0.4 mg Cp24 Take 1 capsule (0.4 mg total) by mouth once daily.    varenicline (CHANTIX) 1 mg Tab Take 1 tablet (1 mg  "total) by mouth 2 (two) times daily.     Family History     Problem Relation (Age of Onset)    Alcohol abuse Father    Drug abuse Mother        Social History Main Topics    Smoking status: Current Every Day Smoker     Packs/day: 2.00     Years: 25.00     Types: Cigarettes    Smokeless tobacco: Not on file      Comment: patient ran out of INFUSD    Alcohol use Yes      Comment: Occasionally, last use was 3 days ago    Drug use: Yes     Special: "Crack" cocaine, Marijuana      Comment: using both daily, $100 of crack daily    Sexual activity: Yes     Partners: Female      Comment:      Review of Systems   Constitutional: Positive for diaphoresis and fatigue.   HENT: Negative.    Eyes: Negative.    Respiratory: Negative for apnea, cough, choking, chest tightness, shortness of breath, wheezing and stridor.    Cardiovascular: Negative.    Gastrointestinal: Positive for abdominal distention and abdominal pain. Negative for anal bleeding and constipation.   Endocrine: Negative for cold intolerance, heat intolerance, polydipsia and polyphagia.   Genitourinary: Negative.    Musculoskeletal: Negative.    Neurological: Negative.    Hematological: Negative.      Objective:     Vital Signs (Most Recent):  Temp: 98.4 °F (36.9 °C) (05/11/17 0807)  Pulse: 60 (05/11/17 1101)  Resp: 18 (05/11/17 0902)  BP: (!) 168/77 (05/11/17 1101)  SpO2: 98 % (05/11/17 1101) Vital Signs (24h Range):  Temp:  [97.8 °F (36.6 °C)-98.4 °F (36.9 °C)] 98.4 °F (36.9 °C)  Pulse:  [60-92] 60  Resp:  [16-18] 18  SpO2:  [96 %-98 %] 98 %  BP: (111-179)/() 168/77     Weight: 76.2 kg (168 lb)  Body mass index is 27.96 kg/(m^2).    Physical Exam   Constitutional: He is oriented to person, place, and time. He appears well-developed and well-nourished.   HENT:   Head: Normocephalic and atraumatic.   Eyes: EOM are normal. Pupils are equal, round, and reactive to light.   Neck: Normal range of motion.   Cardiovascular: Normal heart sounds and " intact distal pulses.    Pulmonary/Chest: Effort normal and breath sounds normal.   Abdominal: He exhibits distension. There is tenderness. There is guarding.   Neurological: He is alert and oriented to person, place, and time.   Skin: Skin is dry.

## 2017-05-11 NOTE — NURSING
Pt verbalized to call doctor to have neurologist consulted c/o tingling pain in both side of the head pt stated had stroke 2014 Dennys Russo notify and informed above.

## 2017-05-11 NOTE — PLAN OF CARE
CM met with patient for discharge planning.  Patient states he needs a ride when he gets discharged.  Informed we don't usually provide rides to patients when discharged.  States he got a ride last time he was here.  Informed that is not our policy and we would discuss his ride when he is feeling better and closer to discharge.  Patient in agreement with plan to discharge home with no needs.    Pharmacy of choice:    [facility] [address]  [address] [address]  Phone: [phone] Fax: [phone]    [facility]  [address]    No PCP at this time, will set up with new PCP at [facility].    Payor: MEDICAID / Plan: [facility] COMMUNITY PLAN [facility] ([place] MEDICAID) / Product Type: Managed Medicaid /         05/11/17 1530   Discharge Assessment   Assessment Type Discharge Planning Assessment   Confirmed/corrected address and phone number on facesheet? Yes   Assessment information obtained from? Patient   Expected Length of Stay (days) 3   Prior to hospitilization cognitive status: Alert/Oriented   Prior to hospitalization functional status: Assistive Equipment   Current cognitive status: Alert/Oriented   Current Functional Status: Assistive Equipment   Arrived From home or self-care   Lives With spouse   Able to Return to Prior Arrangements yes   Is patient able to care for self after discharge? Yes   How many people do you have in your home that can help with your care after discharge? 1   Who are your caregiver(s) and their phone number(s)? Tanya Lemon - spouse [phone]   Patient's perception of discharge disposition home or selfcare   Readmission Within The Last 30 Days no previous admission in last 30 days   Patient currently being followed by outpatient case management? No   Patient currently receives home health services? No   Does the patient currently use HME? Yes   Patient currently receives private duty  nursing? No   Patient currently receives any other outside agency services? No   Equipment Currently Used at Home cane, straight   Do you have any problems affording any of your prescribed medications? Yes   If yes, what medications? Currently has all medications but at times can't afford it.   Is the patient taking medications as prescribed? yes   Do you have any financial concerns preventing you from receiving the healthcare you need? No   Does the patient have transportation to healthcare appointments? Yes   Transportation Available taxi   On Dialysis? No   Does the patient receive services at the Coumadin Clinic? No   Discharge Plan A Home with family   Discharge Plan B Home Health   Patient/Family In Agreement With Plan yes

## 2017-05-11 NOTE — H&P
[facility] Hospital Medicine  History & Physical    Patient Name: Larry Lemon  MRN: [id number]  Admission Date: 5/11/2017  Attending Physician: Lenny Noyola MD  Primary Care Provider: Primary Doctor [facility] Medicine Team: [facility] HOSP MED O Lenny Noyola MD     Patient information was obtained from patient and ER records.     Subjective:     Principal Problem:<principal problem not specified>    Chief Complaint:   Chief Complaint   Patient presents with    Nausea     N/V x3 days, hx of CVA with right sided deficits     Hematemesis        HPI: 51-year-old male presents to the ER with the chief complaint of nausea, vomiting, chest discomfort and R sided abdominal pain. Symptoms ×2 days. He denies any fever or chills. He has no change in bowel or bladder.     His chest pain is nonexertional. His chest pain is nonradiating. He denies any shortness of breath.  The history is provided by the patient.     WBC is 9. CT scan with no obvious source diverticular seen but no diverticulitis.         Past Medical History:   Diagnosis Date    Anxiety     Depression     Heart murmur     Hepatitis C     History of psychiatric hospitalization     5 previous hospitalizations.  Last was in 2015 at [facility]    HTN (hypertension) 11/6/2014    Hx of psychiatric care     Celexa, Zoloft, Seroquel     Psychiatric problem     depression and anxiety    Stroke     right side weakness    Therapy     reports last saw psychiatrist last year and he does not remember the name       Past Surgical History:   Procedure Laterality Date    APPENDECTOMY      FOOT SURGERY         Review of patient's allergies indicates:   Allergen Reactions    Aspirin Shortness Of Breath    Toradol [ketorolac] Shortness Of Breath    Toradol [ketorolac] Anaphylaxis    Aspirin     Codeine     Penicillins     Shellfish containing products     Sulfa (sulfonamide antibiotics)        No current facility-administered  "medications on file prior to encounter.      Current Outpatient Prescriptions on File Prior to Encounter   Medication Sig    baclofen (LIORESAL) 10 MG tablet Take 1-2 Q HS for spasms    buPROPion (WELLBUTRIN XL) 150 MG TB24 tablet Take 1 tablet (150 mg total) by mouth once daily.    clopidogrel (PLAVIX) 75 mg tablet Take 1 tablet (75 mg total) by mouth once daily.    diazePAM (VALIUM) 5 MG tablet Take 1 tablet (5 mg total) by mouth every 6 (six) hours as needed (muscle spasm).    hydrocodone-acetaminophen 5-325mg (NORCO) 5-325 mg per tablet Take 1 tablet by mouth every 6 (six) hours as needed for Pain.    lisinopril (PRINIVIL,ZESTRIL) 40 MG tablet Take 1 tablet (40 mg total) by mouth once daily.    mirtazapine (REMERON SOL-TAB) 30 MG disintegrating tablet Take 1 tablet (30 mg total) by mouth nightly.    simvastatin (ZOCOR) 40 MG tablet Take 1 tablet (40 mg total) by mouth every evening.    tamsulosin (FLOMAX) 0.4 mg Cp24 Take 1 capsule (0.4 mg total) by mouth once daily.    varenicline (CHANTIX) 1 mg Tab Take 1 tablet (1 mg total) by mouth 2 (two) times daily.     Family History     Problem Relation (Age of Onset)    Alcohol abuse Father    Drug abuse Mother        Social History Main Topics    Smoking status: Current Every Day Smoker     Packs/day: 2.00     Years: 25.00     Types: Cigarettes    Smokeless tobacco: Not on file      Comment: patient ran out of [employer]    Alcohol use Yes      Comment: Occasionally, last use was 3 days ago    Drug use: Yes     Special: "Crack" cocaine, Marijuana      Comment: using both daily, $100 of crack daily    Sexual activity: Yes     Partners: Female      Comment:      Review of Systems   Constitutional: Positive for diaphoresis and fatigue.   HENT: Negative.    Eyes: Negative.    Respiratory: Negative for apnea, cough, choking, chest tightness, shortness of breath, wheezing and stridor.    Cardiovascular: Negative.    Gastrointestinal: Positive for " abdominal distention and abdominal pain. Negative for anal bleeding and constipation.   Endocrine: Negative for cold intolerance, heat intolerance, polydipsia and polyphagia.   Genitourinary: Negative.    Musculoskeletal: Negative.    Neurological: Negative.    Hematological: Negative.      Objective:     Vital Signs (Most Recent):  Temp: 98.4 °F (36.9 °C) (05/11/17 0807)  Pulse: 60 (05/11/17 1101)  Resp: 18 (05/11/17 0902)  BP: (!) 168/77 (05/11/17 1101)  SpO2: 98 % (05/11/17 1101) Vital Signs (24h Range):  Temp:  [97.8 °F (36.6 °C)-98.4 °F (36.9 °C)] 98.4 °F (36.9 °C)  Pulse:  [60-92] 60  Resp:  [16-18] 18  SpO2:  [96 %-98 %] 98 %  BP: (111-179)/() 168/77     Weight: 76.2 kg (168 lb)  Body mass index is 27.96 kg/(m^2).    Physical Exam   Constitutional: He is oriented to person, place, and time. He appears well-developed and well-nourished.   HENT:   Head: Normocephalic and atraumatic.   Eyes: EOM are normal. Pupils are equal, round, and reactive to light.   Neck: Normal range of motion.   Cardiovascular: Normal heart sounds and intact distal pulses.    Pulmonary/Chest: Effort normal and breath sounds normal.   Abdominal: He exhibits distension. There is tenderness. There is guarding.   Neurological: He is alert and oriented to person, place, and time.   Skin: Skin is dry.            Assessment/Plan:     Abdominal pain  Refused to take oral meds until he received IV dilaudid      Diverticulosis of large intestine without hemorrhage  Will start   cipro and flagyl  No CT scan findings  Consider appedicitis      VTE Risk Mitigation     None        Lenny Noyola MD  Department of Hospital Medicine   [facility]

## 2017-05-11 NOTE — IP AVS SNAPSHOT
[facility]  [address]  [facility] [address]  Phone: [phone]           Patient Discharge Instructions   Our goal is to set you up for success. This packet includes information on your condition, medications, and your home care.  It will help you care for yourself to prevent having to return to the hospital.     Please ask your nurse if you have any questions.      There are many details to remember when preparing to leave the hospital. Here is what you will need to do:    1. Take your medicine. If you are prescribed medications, review your Medication List on the following pages. You may have new medications to  at the pharmacy and others that you'll need to stop taking. Review the instructions for how and when to take your medications. Talk with your doctor or nurses if you are unsure of what to do.     2. Go to your follow-up appointments. Specific follow-up information is listed in the following pages. Your may be contacted by a nurse or clinical provider about future appointments. Be sure we have all of the phone numbers to reach you. Please contact your provider's office if you are unable to make an appointment.     3. Watch for warning signs. Your doctor or nurse will give you detailed warning signs to watch for and when to call for assistance. These instructions may also include educational information about your condition. If you experience any of warning signs to your health, call your doctor.           [facility]  Unless otherwise directed by your provider, please   contact [facility], our nurse care line   that is available for 24/7 assistance.     [phone] (toll-free)     Registered nurses in the [facility]   provide: appointment scheduling, clinical advisement, health education, and other advisory services.                  ** Verify the list of medication(s) below is accurate and up to date. Carry this with you in case of  emergency. If your medications have changed, please notify your healthcare provider.             Medication List      START taking these medications        Additional Info                      ciprofloxacin HCl 500 MG tablet   Commonly known as:  CIPRO   Quantity:  5 tablet   Refills:  0   Dose:  500 mg    Instructions:  Take 1 tablet (500 mg total) by mouth 2 (two) times daily.     Begin Date    AM    Noon    PM    Bedtime         CHANGE how you take these medications        Additional Info                      * hydrocodone-acetaminophen 5-325mg 5-325 mg per tablet   Commonly known as:  NORCO   Quantity:  12 tablet   Refills:  0   Dose:  1 tablet   What changed:  Another medication with the same name was added. Make sure you understand how and when to take each.    Instructions:  Take 1 tablet by mouth every 6 (six) hours as needed for Pain.     Begin Date    AM    Noon    PM    Bedtime       * hydrocodone-acetaminophen 7.5-325mg 7.5-325 mg per tablet   Commonly known as:  NORCO   Quantity:  6 tablet   Refills:  0   Dose:  1 tablet   What changed:  You were already taking a medication with the same name, and this prescription was added. Make sure you understand how and when to take each.    Last time this was given:  1 tablet on 5/15/2017  8:03 AM   Instructions:  Take 1 tablet by mouth every 6 (six) hours as needed for Pain.     Begin Date    AM    Noon    PM    Bedtime       * Notice:  This list has 2 medication(s) that are the same as other medications prescribed for you. Read the directions carefully, and ask your doctor or other care provider to review them with you.      CONTINUE taking these medications        Additional Info                      baclofen 10 MG tablet   Commonly known as:  LIORESAL   Quantity:  60 tablet   Refills:  6    Instructions:  Take 1-2 Q HS for spasms     Begin Date    AM    Noon    PM    Bedtime       buPROPion 150 MG TB24 tablet   Commonly known as:  WELLBUTRIN XL   Quantity:  30  tablet   Refills:  0   Dose:  150 mg   Indications:  Anxiety with Depression    Last time this was given:  150 mg on 5/15/2017  8:04 AM   Instructions:  Take 1 tablet (150 mg total) by mouth once daily.     Begin Date    AM    Noon    PM    Bedtime       clopidogrel 75 mg tablet   Commonly known as:  PLAVIX   Quantity:  30 tablet   Refills:  1   Dose:  75 mg    Last time this was given:  75 mg on 5/15/2017  8:04 AM   Instructions:  Take 1 tablet (75 mg total) by mouth once daily.     Begin Date    AM    Noon    PM    Bedtime       diazePAM 5 MG tablet   Commonly known as:  VALIUM   Quantity:  10 tablet   Refills:  0   Dose:  5 mg    Last time this was given:  5 mg on 5/15/2017 11:10 AM   Instructions:  Take 1 tablet (5 mg total) by mouth every 6 (six) hours as needed (muscle spasm).     Begin Date    AM    Noon    PM    Bedtime       lisinopril 40 MG tablet   Commonly known as:  PRINIVIL,ZESTRIL   Quantity:  30 tablet   Refills:  1   Dose:  40 mg   Indications:  Hypertension    Last time this was given:  40 mg on 5/15/2017  8:04 AM   Instructions:  Take 1 tablet (40 mg total) by mouth once daily.     Begin Date    AM    Noon    PM    Bedtime       mirtazapine 30 MG disintegrating tablet   Commonly known as:  REMERON SOL-TAB   Quantity:  30 tablet   Refills:  0   Dose:  30 mg   Indications:  major depressive disorder    Instructions:  Take 1 tablet (30 mg total) by mouth nightly.     Begin Date    AM    Noon    PM    Bedtime       simvastatin 40 MG tablet   Commonly known as:  ZOCOR   Quantity:  30 tablet   Refills:  5   Dose:  40 mg    Last time this was given:  40 mg on 5/14/2017  9:10 PM   Instructions:  Take 1 tablet (40 mg total) by mouth every evening.     Begin Date    AM    Noon    PM    Bedtime       tamsulosin 0.4 mg Cp24   Commonly known as:  FLOMAX   Quantity:  10 capsule   Refills:  0   Dose:  0.4 mg    Instructions:  Take 1 capsule (0.4 mg total) by mouth once daily.     Begin Date    AM    Noon    PM     Bedtime       varenicline 1 mg Tab   Commonly known as:  CHANTIX   Quantity:  60 tablet   Refills:  1   Dose:  1 mg    Instructions:  Take 1 tablet (1 mg total) by mouth 2 (two) times daily.     Begin Date    AM    Noon    PM    Bedtime            Where to Get Your Medications      These medications were sent to [facility] ([place], [place] - [address]  [address], [place] [address]     Phone:  [phone]     ciprofloxacin HCl 500 MG tablet         You can get these medications from any pharmacy     Bring a paper prescription for each of these medications     clopidogrel 75 mg tablet    hydrocodone-acetaminophen 7.5-325mg 7.5-325 mg per tablet         Information about where to get these medications is not yet available     ! Ask your nurse or doctor about these medications     lisinopril 40 MG tablet                  Please bring to all follow up appointments:    1. A copy of your discharge instructions.  2. All medicines you are currently taking in their original bottles.  3. Identification and insurance card.    Please arrive 15 minutes ahead of scheduled appointment time.    Please call 24 hours in advance if you must reschedule your appointment and/or time.        Your Scheduled Appointments     May 22, 2017 10:40 AM CDT   New Patient with Vicente Hua MD   [facility]    [address] [place] [address] [phone]              Follow-up Information     Follow up with Vicente Hua MD On 5/22/2017.    Specialty:  Internal Medicine    Why:  at 10:40 AM at [address] [place] [address].  Arrive 15 minutes early.    Contact information:    [address]  [facility] [address] [phone]        Referrals     Future Orders    Ambulatory referral to Gastroenterology         Discharge Instructions     Future Orders    Activity as tolerated     Call MD for:  persistent nausea and vomiting or  "diarrhea     Call MD for:  temperature >100.4     Diet general     Questions:    Total calories:      Fat restriction, if any:      Protein restriction, if any:      Na restriction, if any:      Fluid restriction:      Additional restrictions:  Light/GI Soft        Primary Diagnosis     Your primary diagnosis was:  Not on File      Admission Information     Date & Time Provider Department CSN    5/11/2017  3:28 AM Lenny Noyola MD [facility] [id number]      Care Providers     Provider Role Specialty Primary office phone    Lenny Noyola MD Attending Provider Hospitalist [phone]    Lenny Noyola MD Team Attending  Hospitalist [phone]      Your Vitals Were     BP Pulse Temp Resp Height Weight    124/74 (BP Location: Right arm, Patient Position: Lying, BP Method: Automatic) 87 97.7 °F (36.5 °C) (Oral) 18 5' 5" (1.651 m) 76.2 kg (168 lb)    SpO2 BMI             96% 27.96 kg/m2         Recent Lab Values     No lab values to display.      Allergies as of 5/15/2017        Reactions    Aspirin Shortness Of Breath    Toradol [Ketorolac] Shortness Of Breath    Toradol [Ketorolac] Anaphylaxis    Aspirin     Codeine     Penicillins     Shellfish Containing Products     Sulfa (Sulfonamide Antibiotics)       Advance Directives     An advance directive is a document which, in the event you are no longer able to make decisions for yourself, tells your healthcare team what kind of treatment you do or do not want to receive, or who you would like to make those decisions for you.  If you do not currently have an advance directive, [facility] encourages you to create one.  For more information call:  [phone] ([phone]), [phone] ([phone]),  or log on to [email or web address].        Smoking Cessation     If you would like to quit smoking:   You may be eligible for free services if you are a [place] resident and started smoking cigarettes before September 1, 1988.  Call the " Smoking Cessation Trust ([facility]) toll free at (989) 469-7362 or (401) [phone].   Call 1-800-QUIT-NOW if you do not meet the above criteria.   Contact us via email: [email or web address]   View our website for more information: [email or web address]        Language Assistance Services     ATTENTION: Language assistance services are available, free of charge. Please call [phone].      ATENCIÓN: Si habla español, tiene a alves disposición servicios gratuitos de asistencia lingüística. Llame al [phone].     CHÚ Ý: N?u b?n nói Ti?ng Vi?t, có các d?ch v? h? tr? ngôn ng? mi?n phí dành cho b?n. G?i s? [phone].        [facility] Sign-Up     Activating your [facility] account is as easy as 1-2-3!     1) Visit [email or web address], select Sign Up Now, enter this activation code and your date of birth, then select Next.  [email or web address]  Expires: 6/11/2017  8:09 AM      2) Create a username and password to use when you visit [facility] in the future and select a security question in case you lose your password and select Next.    3) Enter your e-mail address and click Sign Up!    Additional Information  If you have questions, please e-mail [email or web address] or call [phone] to talk to our [facility] staff. Remember, [facility] is NOT to be used for urgent needs. For medical emergencies, dial 911.          [facility] complies with applicable Federal civil rights laws and does not discriminate on the basis of race, color, national origin, age, disability, or sex.

## 2017-05-11 NOTE — ED NOTES
"Dr. Noyola aware pt states, " I can't swallow no pills now. It hurts too bad, they ain't going down". Pt currently tolerating oral secretions well and PO liquids with no distress noted.  "

## 2017-05-12 LAB
ALBUMIN SERPL BCP-MCNC: 3.3 G/DL
ALP SERPL-CCNC: 45 U/L
ALT SERPL W/O P-5'-P-CCNC: 24 U/L
AMPHET+METHAMPHET UR QL: NEGATIVE
ANION GAP SERPL CALC-SCNC: 10 MMOL/L
AST SERPL-CCNC: 21 U/L
BARBITURATES UR QL SCN>200 NG/ML: NEGATIVE
BASOPHILS # BLD AUTO: 0.07 K/UL
BASOPHILS NFR BLD: 0.9 %
BENZODIAZ UR QL SCN>200 NG/ML: NORMAL
BILIRUB SERPL-MCNC: 0.5 MG/DL
BUN SERPL-MCNC: 7 MG/DL
BZE UR QL SCN: NORMAL
CALCIUM SERPL-MCNC: 8.8 MG/DL
CANNABINOIDS UR QL SCN: NORMAL
CHLORIDE SERPL-SCNC: 103 MMOL/L
CO2 SERPL-SCNC: 19 MMOL/L
CREAT SERPL-MCNC: 1.1 MG/DL
CREAT UR-MCNC: 105 MG/DL
DIFFERENTIAL METHOD: ABNORMAL
EOSINOPHIL # BLD AUTO: 0.3 K/UL
EOSINOPHIL NFR BLD: 3.1 %
ERYTHROCYTE [DISTWIDTH] IN BLOOD BY AUTOMATED COUNT: 15.5 %
EST. GFR  (AFRICAN AMERICAN): >60 ML/MIN/1.73 M^2
EST. GFR  (NON AFRICAN AMERICAN): >60 ML/MIN/1.73 M^2
ETHANOL UR-MCNC: <10 MG/DL
GLUCOSE SERPL-MCNC: 199 MG/DL
HCT VFR BLD AUTO: 38.8 %
HGB BLD-MCNC: 12.4 G/DL
LYMPHOCYTES # BLD AUTO: 2.4 K/UL
LYMPHOCYTES NFR BLD: 29.7 %
MCH RBC QN AUTO: 25.3 PG
MCHC RBC AUTO-ENTMCNC: 32 %
MCV RBC AUTO: 79 FL
METHADONE UR QL SCN>300 NG/ML: NEGATIVE
MONOCYTES # BLD AUTO: 0.5 K/UL
MONOCYTES NFR BLD: 6.6 %
NEUTROPHILS # BLD AUTO: 4.8 K/UL
NEUTROPHILS NFR BLD: 59.5 %
OPIATES UR QL SCN: NEGATIVE
PCP UR QL SCN>25 NG/ML: NEGATIVE
PLATELET # BLD AUTO: 328 K/UL
PMV BLD AUTO: 8.8 FL
POTASSIUM SERPL-SCNC: 4 MMOL/L
PROT SERPL-MCNC: 6.6 G/DL
RBC # BLD AUTO: 4.9 M/UL
SODIUM SERPL-SCNC: 132 MMOL/L
TOXICOLOGY INFORMATION: NORMAL
WBC # BLD AUTO: 8.07 K/UL

## 2017-05-12 PROCEDURE — G0378 HOSPITAL OBSERVATION PER HR: HCPCS

## 2017-05-12 PROCEDURE — 80307 DRUG TEST PRSMV CHEM ANLYZR: CPT

## 2017-05-12 PROCEDURE — 99233 SBSQ HOSP IP/OBS HIGH 50: CPT | Mod: ,,, | Performed by: PHYSICIAN ASSISTANT

## 2017-05-12 PROCEDURE — 63600175 PHARM REV CODE 636 W HCPCS: Performed by: INTERNAL MEDICINE

## 2017-05-12 PROCEDURE — 25000003 PHARM REV CODE 250: Performed by: PHYSICIAN ASSISTANT

## 2017-05-12 PROCEDURE — 25000003 PHARM REV CODE 250: Performed by: INTERNAL MEDICINE

## 2017-05-12 PROCEDURE — 36415 COLL VENOUS BLD VENIPUNCTURE: CPT

## 2017-05-12 PROCEDURE — 85025 COMPLETE CBC W/AUTO DIFF WBC: CPT

## 2017-05-12 PROCEDURE — 80053 COMPREHEN METABOLIC PANEL: CPT

## 2017-05-12 RX ORDER — SIMVASTATIN 40 MG/1
40 TABLET, FILM COATED ORAL NIGHTLY
Status: DISCONTINUED | OUTPATIENT
Start: 2017-05-12 | End: 2017-05-15 | Stop reason: HOSPADM

## 2017-05-12 RX ORDER — RAMELTEON 8 MG/1
8 TABLET ORAL NIGHTLY PRN
Status: DISCONTINUED | OUTPATIENT
Start: 2017-05-12 | End: 2017-05-15 | Stop reason: HOSPADM

## 2017-05-12 RX ADMIN — RAMELTEON 8 MG: 8 TABLET, FILM COATED ORAL at 10:05

## 2017-05-12 RX ADMIN — ALUMINUM HYDROXIDE, MAGNESIUM HYDROXIDE, AND SIMETHICONE 50 ML: 200; 200; 20 SUSPENSION ORAL at 11:05

## 2017-05-12 RX ADMIN — SIMVASTATIN 40 MG: 40 TABLET, FILM COATED ORAL at 09:05

## 2017-05-12 RX ADMIN — DIAZEPAM 5 MG: 5 TABLET ORAL at 09:05

## 2017-05-12 RX ADMIN — BUPROPION HYDROCHLORIDE 150 MG: 150 TABLET, FILM COATED, EXTENDED RELEASE ORAL at 08:05

## 2017-05-12 RX ADMIN — METRONIDAZOLE 500 MG: 500 INJECTION, SOLUTION INTRAVENOUS at 04:05

## 2017-05-12 RX ADMIN — CIPROFLOXACIN 400 MG: 2 INJECTION, SOLUTION INTRAVENOUS at 09:05

## 2017-05-12 RX ADMIN — DIAZEPAM 5 MG: 5 TABLET ORAL at 03:05

## 2017-05-12 RX ADMIN — HYDROMORPHONE HYDROCHLORIDE 0.5 MG: 1 INJECTION, SOLUTION INTRAMUSCULAR; INTRAVENOUS; SUBCUTANEOUS at 12:05

## 2017-05-12 RX ADMIN — HYDROMORPHONE HYDROCHLORIDE 0.5 MG: 1 INJECTION, SOLUTION INTRAMUSCULAR; INTRAVENOUS; SUBCUTANEOUS at 06:05

## 2017-05-12 RX ADMIN — CLOPIDOGREL 75 MG: 75 TABLET, FILM COATED ORAL at 08:05

## 2017-05-12 RX ADMIN — METRONIDAZOLE 500 MG: 500 INJECTION, SOLUTION INTRAVENOUS at 08:05

## 2017-05-12 RX ADMIN — LISINOPRIL 40 MG: 20 TABLET ORAL at 08:05

## 2017-05-12 NOTE — SUBJECTIVE & OBJECTIVE
Interval History: no acute events overnight; on admission pt refusing to take oral meds until received IV dilaudid.  Pt reports abdominal pain slightly improved, remains R and LLQ.  GI cocktail providing relief.    Review of Systems   Constitutional: Positive for appetite change and fatigue. Negative for diaphoresis and fever.   HENT: Negative.    Eyes: Negative.    Respiratory: Negative for cough, choking, chest tightness and shortness of breath.    Cardiovascular: Negative.    Gastrointestinal: Positive for abdominal distention and abdominal pain. Negative for anal bleeding, constipation, diarrhea, nausea and vomiting.   Endocrine: Negative for cold intolerance, heat intolerance, polydipsia and polyphagia.   Genitourinary: Negative.    Musculoskeletal: Negative.    Neurological: Negative.    Hematological: Negative.      Objective:     Vital Signs (Most Recent):  Temp: 98.4 °F (36.9 °C) (05/12/17 1200)  Pulse: 66 (05/12/17 0800)  Resp: 18 (05/12/17 1200)  BP: 128/80 (05/12/17 1200)  SpO2: 96 % (05/12/17 1200) Vital Signs (24h Range):  Temp:  [97.5 °F (36.4 °C)-98.9 °F (37.2 °C)] 98.4 °F (36.9 °C)  Pulse:  [64-71] 66  Resp:  [17-18] 18  SpO2:  [96 %-99 %] 96 %  BP: (128-164)/(79-92) 128/80     Weight: 76.2 kg (168 lb)  Body mass index is 27.96 kg/(m^2).    Intake/Output Summary (Last 24 hours) at 05/12/17 1414  Last data filed at 05/11/17 2327   Gross per 24 hour   Intake              620 ml   Output              600 ml   Net               20 ml      Physical Exam   Constitutional: He is oriented to person, place, and time. He appears well-developed and well-nourished.   HENT:   Head: Normocephalic and atraumatic.   Eyes: EOM are normal. Pupils are equal, round, and reactive to light.   Neck: Normal range of motion.   Cardiovascular: Normal heart sounds and intact distal pulses.    Pulmonary/Chest: Effort normal and breath sounds normal.   Abdominal: He exhibits distension. There is tenderness in the right lower  quadrant and left lower quadrant. There is no guarding.   Neurological: He is alert and oriented to person, place, and time.   Skin: Skin is dry.       Significant Labs: All pertinent labs within the past 24 hours have been reviewed.    Significant Imaging: I have reviewed all pertinent imaging results/findings within the past 24 hours.

## 2017-05-12 NOTE — NURSING
Placed teds stocking this am pt removed this afternoon instructed pt it help prevent blood clots because high risk for VTE blood clots pt still refused stated I'm walking './

## 2017-05-12 NOTE — PROGRESS NOTES
[facility] Hospital Medicine  Progress Note    Patient Name: Larry Lemon  MRN: [id number]  Patient Class: OP- Observation   Admission Date: 5/11/2017  Length of Stay: 0 days  Attending Physician: Lenny Noyola MD  Primary Care Provider: Primary Doctor [facility] Medicine Team: [facility] MED F Amanda Tracey PA-C    Subjective:     Principal Problem:Abdominal pain    HPI:  51-year-old male presents to the ER with the chief complaint of nausea, vomiting, chest discomfort and R sided abdominal pain. Symptoms ×2 days. He denies any fever or chills. He has no change in bowel or bladder.     His chest pain is nonexertional. His chest pain is nonradiating. He denies any shortness of breath.  The history is provided by the patient.     WBC is 9. CT scan with no obvious source diverticular seen but no diverticulitis.         Hospital Course:  Pt admitted to observation for N/V and abdominal pain x 3 days.  No acute findings on CT.  Pt started on cipro and flagyl due to WBC 19.47 and history of diverticulosis.    Interval History: no acute events overnight; on admission pt refusing to take oral meds until received IV dilaudid.  Pt reports abdominal pain slightly improved, remains R and LLQ.  GI cocktail providing relief.    Review of Systems   Constitutional: Positive for appetite change and fatigue. Negative for diaphoresis and fever.   HENT: Negative.    Eyes: Negative.    Respiratory: Negative for cough, choking, chest tightness and shortness of breath.    Cardiovascular: Negative.    Gastrointestinal: Positive for abdominal distention and abdominal pain. Negative for anal bleeding, constipation, diarrhea, nausea and vomiting.   Endocrine: Negative for cold intolerance, heat intolerance, polydipsia and polyphagia.   Genitourinary: Negative.    Musculoskeletal: Negative.    Neurological: Negative.    Hematological: Negative.      Objective:     Vital Signs (Most Recent):  Temp: 98.4 °F  (36.9 °C) (05/12/17 1200)  Pulse: 66 (05/12/17 0800)  Resp: 18 (05/12/17 1200)  BP: 128/80 (05/12/17 1200)  SpO2: 96 % (05/12/17 1200) Vital Signs (24h Range):  Temp:  [97.5 °F (36.4 °C)-98.9 °F (37.2 °C)] 98.4 °F (36.9 °C)  Pulse:  [64-71] 66  Resp:  [17-18] 18  SpO2:  [96 %-99 %] 96 %  BP: (128-164)/(79-92) 128/80     Weight: 76.2 kg (168 lb)  Body mass index is 27.96 kg/(m^2).    Intake/Output Summary (Last 24 hours) at 05/12/17 1414  Last data filed at 05/11/17 2327   Gross per 24 hour   Intake              620 ml   Output              600 ml   Net               20 ml      Physical Exam   Constitutional: He is oriented to person, place, and time. He appears well-developed and well-nourished.   HENT:   Head: Normocephalic and atraumatic.   Eyes: EOM are normal. Pupils are equal, round, and reactive to light.   Neck: Normal range of motion.   Cardiovascular: Normal heart sounds and intact distal pulses.    Pulmonary/Chest: Effort normal and breath sounds normal.   Abdominal: He exhibits distension. There is tenderness in the right lower quadrant and left lower quadrant. There is no guarding.   Neurological: He is alert and oriented to person, place, and time.   Skin: Skin is dry.       Significant Labs: All pertinent labs within the past 24 hours have been reviewed.    Significant Imaging: I have reviewed all pertinent imaging results/findings within the past 24 hours.    Assessment/Plan:      * Abdominal pain  Refused to take oral meds until he received IV dilaudid on admission  Explained to pt effects of opioids on bowel, pt demonstrated understanding.  Will advance diet as tolerated.      Diverticulosis of large intestine without hemorrhage  Will continue cipro and flagyl  No CT scan findings  WBC 19.47 to 8.07, pt remains afebrile      VTE Risk Mitigation         Ordered     High Risk of VTE  Once      05/12/17 0934          Amanda Tracey PA-C  Department of Hospital Medicine   [facility]

## 2017-05-12 NOTE — ASSESSMENT & PLAN NOTE
Refused to take oral meds until he received IV dilaudid on admission  Explained to pt effects of opioids on bowel, pt demonstrated understanding.  Will advance diet as tolerated.

## 2017-05-13 LAB
ALBUMIN SERPL BCP-MCNC: 3.1 G/DL
ALP SERPL-CCNC: 52 U/L
ALT SERPL W/O P-5'-P-CCNC: 31 U/L
ANION GAP SERPL CALC-SCNC: 10 MMOL/L
AST SERPL-CCNC: 26 U/L
BASOPHILS # BLD AUTO: 0.06 K/UL
BASOPHILS NFR BLD: 0.6 %
BILIRUB SERPL-MCNC: 0.3 MG/DL
BUN SERPL-MCNC: 14 MG/DL
CALCIUM SERPL-MCNC: 8.3 MG/DL
CHLORIDE SERPL-SCNC: 105 MMOL/L
CO2 SERPL-SCNC: 23 MMOL/L
CREAT SERPL-MCNC: 1.1 MG/DL
DIFFERENTIAL METHOD: ABNORMAL
EOSINOPHIL # BLD AUTO: 0.4 K/UL
EOSINOPHIL NFR BLD: 4.1 %
ERYTHROCYTE [DISTWIDTH] IN BLOOD BY AUTOMATED COUNT: 15.1 %
EST. GFR  (AFRICAN AMERICAN): >60 ML/MIN/1.73 M^2
EST. GFR  (NON AFRICAN AMERICAN): >60 ML/MIN/1.73 M^2
GLUCOSE SERPL-MCNC: 118 MG/DL
HCT VFR BLD AUTO: 37.2 %
HGB BLD-MCNC: 12.3 G/DL
LYMPHOCYTES # BLD AUTO: 3.1 K/UL
LYMPHOCYTES NFR BLD: 31.5 %
MCH RBC QN AUTO: 25.1 PG
MCHC RBC AUTO-ENTMCNC: 33.1 %
MCV RBC AUTO: 76 FL
MONOCYTES # BLD AUTO: 1 K/UL
MONOCYTES NFR BLD: 10.3 %
NEUTROPHILS # BLD AUTO: 5.3 K/UL
NEUTROPHILS NFR BLD: 53.3 %
PLATELET # BLD AUTO: 344 K/UL
PMV BLD AUTO: 8.7 FL
POTASSIUM SERPL-SCNC: 4.1 MMOL/L
PROT SERPL-MCNC: 6.2 G/DL
RBC # BLD AUTO: 4.9 M/UL
SODIUM SERPL-SCNC: 138 MMOL/L
WBC # BLD AUTO: 9.85 K/UL

## 2017-05-13 PROCEDURE — 85025 COMPLETE CBC W/AUTO DIFF WBC: CPT

## 2017-05-13 PROCEDURE — 36415 COLL VENOUS BLD VENIPUNCTURE: CPT

## 2017-05-13 PROCEDURE — 25000003 PHARM REV CODE 250: Performed by: INTERNAL MEDICINE

## 2017-05-13 PROCEDURE — 25000003 PHARM REV CODE 250: Performed by: PHYSICIAN ASSISTANT

## 2017-05-13 PROCEDURE — 63600175 PHARM REV CODE 636 W HCPCS: Performed by: INTERNAL MEDICINE

## 2017-05-13 PROCEDURE — G0378 HOSPITAL OBSERVATION PER HR: HCPCS

## 2017-05-13 PROCEDURE — 99233 SBSQ HOSP IP/OBS HIGH 50: CPT | Mod: ,,, | Performed by: PHYSICIAN ASSISTANT

## 2017-05-13 PROCEDURE — 80053 COMPREHEN METABOLIC PANEL: CPT

## 2017-05-13 RX ORDER — HYDROCODONE BITARTRATE AND ACETAMINOPHEN 7.5; 325 MG/1; MG/1
1 TABLET ORAL EVERY 8 HOURS PRN
Status: DISCONTINUED | OUTPATIENT
Start: 2017-05-13 | End: 2017-05-15 | Stop reason: HOSPADM

## 2017-05-13 RX ORDER — ALPRAZOLAM 0.25 MG/1
0.25 TABLET ORAL DAILY PRN
Status: DISCONTINUED | OUTPATIENT
Start: 2017-05-13 | End: 2017-05-15 | Stop reason: HOSPADM

## 2017-05-13 RX ADMIN — METRONIDAZOLE 500 MG: 500 INJECTION, SOLUTION INTRAVENOUS at 12:05

## 2017-05-13 RX ADMIN — ALUMINUM HYDROXIDE, MAGNESIUM HYDROXIDE, AND SIMETHICONE 50 ML: 200; 200; 20 SUSPENSION ORAL at 06:05

## 2017-05-13 RX ADMIN — HYDROMORPHONE HYDROCHLORIDE 0.5 MG: 1 INJECTION, SOLUTION INTRAMUSCULAR; INTRAVENOUS; SUBCUTANEOUS at 06:05

## 2017-05-13 RX ADMIN — LISINOPRIL 40 MG: 20 TABLET ORAL at 09:05

## 2017-05-13 RX ADMIN — DIAZEPAM 5 MG: 5 TABLET ORAL at 10:05

## 2017-05-13 RX ADMIN — HYDROCODONE BITARTRATE AND ACETAMINOPHEN 1 TABLET: 7.5; 325 TABLET ORAL at 04:05

## 2017-05-13 RX ADMIN — METRONIDAZOLE 500 MG: 500 INJECTION, SOLUTION INTRAVENOUS at 09:05

## 2017-05-13 RX ADMIN — CIPROFLOXACIN 400 MG: 2 INJECTION, SOLUTION INTRAVENOUS at 10:05

## 2017-05-13 RX ADMIN — HYDROMORPHONE HYDROCHLORIDE 0.5 MG: 1 INJECTION, SOLUTION INTRAMUSCULAR; INTRAVENOUS; SUBCUTANEOUS at 12:05

## 2017-05-13 RX ADMIN — BUPROPION HYDROCHLORIDE 150 MG: 150 TABLET, FILM COATED, EXTENDED RELEASE ORAL at 09:05

## 2017-05-13 RX ADMIN — ALPRAZOLAM 0.25 MG: 0.25 TABLET ORAL at 06:05

## 2017-05-13 RX ADMIN — CLOPIDOGREL 75 MG: 75 TABLET, FILM COATED ORAL at 09:05

## 2017-05-13 RX ADMIN — DIAZEPAM 5 MG: 5 TABLET ORAL at 11:05

## 2017-05-13 RX ADMIN — DIAZEPAM 5 MG: 5 TABLET ORAL at 04:05

## 2017-05-13 RX ADMIN — DIAZEPAM 5 MG: 5 TABLET ORAL at 03:05

## 2017-05-13 RX ADMIN — CIPROFLOXACIN 400 MG: 2 INJECTION, SOLUTION INTRAVENOUS at 09:05

## 2017-05-13 RX ADMIN — ALUMINUM HYDROXIDE, MAGNESIUM HYDROXIDE, AND SIMETHICONE 50 ML: 200; 200; 20 SUSPENSION ORAL at 12:05

## 2017-05-13 RX ADMIN — RAMELTEON 8 MG: 8 TABLET, FILM COATED ORAL at 09:05

## 2017-05-13 RX ADMIN — SIMVASTATIN 40 MG: 40 TABLET, FILM COATED ORAL at 09:05

## 2017-05-13 NOTE — ASSESSMENT & PLAN NOTE
Will continue cipro and flagyl  No CT scan findings  WBC 19.47 to 8.07, pt remains afebrile  5/13 will d/c flagyl due to nausea and pt remaining afebrile without leukocytosis

## 2017-05-13 NOTE — ASSESSMENT & PLAN NOTE
Refused to take oral meds until he received IV dilaudid on admission  Explained to pt effects of opioids on bowel, pt demonstrated understanding.    5/13 will d/c IV pain medication and transition to po.  Will advance diet as tolerated.

## 2017-05-13 NOTE — PROGRESS NOTES
[facility] Hospital Medicine  Progress Note    Patient Name: Larry Lemon  MRN: [id number]  Patient Class: OP- Observation   Admission Date: 5/11/2017  Length of Stay: 0 days  Attending Physician: Lenny Noyola MD  Primary Care Provider: Primary Doctor [facility] Medicine Team: [facility] MED F Amanda Tracey PA-C    Subjective:     Principal Problem:Abdominal pain    HPI:  51-year-old male presents to the ER with the chief complaint of nausea, vomiting, chest discomfort and R sided abdominal pain. Symptoms ×2 days. He denies any fever or chills. He has no change in bowel or bladder.     His chest pain is nonexertional. His chest pain is nonradiating. He denies any shortness of breath.  The history is provided by the patient.     WBC is 9. CT scan with no obvious source diverticular seen but no diverticulitis.         Hospital Course:  Pt admitted to observation for N/V and abdominal pain x 3 days.  No acute findings on CT.  Pt started on cipro and flagyl due to WBC 19.47 and history of diverticulosis.   WBC 19.47->8.07->9.85.  5/13 pt c/o nausea and vomiting x 1, limited po intake.  Will d/c flagyl.  Pt agreeable to d/c IV pain medication and transition to po.    Interval History: no acute events overnight; pt states more nausea and one episode of vomiting.  Abdominal pain remains unchanged.  Pt asking for vicodin.      Review of Systems   Constitutional: Positive for appetite change and fatigue. Negative for diaphoresis and fever.   HENT: Negative.    Eyes: Negative.    Respiratory: Negative for cough, choking, chest tightness and shortness of breath.    Cardiovascular: Negative.    Gastrointestinal: Positive for abdominal distention and abdominal pain. Negative for anal bleeding, constipation, diarrhea, nausea and vomiting.   Endocrine: Negative for cold intolerance, heat intolerance, polydipsia and polyphagia.   Genitourinary: Negative.    Musculoskeletal: Negative.     Neurological: Negative.    Hematological: Negative.      Objective:     Vital Signs (Most Recent):  Temp: 97.2 °F (36.2 °C) (05/13/17 1212)  Pulse: 75 (05/13/17 1212)  Resp: 20 (05/13/17 1212)  BP: 120/78 (05/13/17 1212)  SpO2: 96 % (05/13/17 1212) Vital Signs (24h Range):  Temp:  [97.2 °F (36.2 °C)-98.7 °F (37.1 °C)] 97.2 °F (36.2 °C)  Pulse:  [65-77] 75  Resp:  [16-20] 20  SpO2:  [91 %-99 %] 96 %  BP: (120-144)/(56-79) 120/78     Weight: 76.2 kg (168 lb)  Body mass index is 27.96 kg/(m^2).    Intake/Output Summary (Last 24 hours) at 05/13/17 1336  Last data filed at 05/13/17 0908   Gross per 24 hour   Intake              700 ml   Output              300 ml   Net              400 ml      Physical Exam   Constitutional: He is oriented to person, place, and time. He appears well-developed and well-nourished.   HENT:   Head: Normocephalic and atraumatic.   Eyes: EOM are normal. Pupils are equal, round, and reactive to light.   Neck: Normal range of motion.   Cardiovascular: Normal heart sounds and intact distal pulses.    Pulmonary/Chest: Effort normal and breath sounds normal.   Abdominal: He exhibits distension. There is tenderness in the right lower quadrant and left lower quadrant. There is no guarding.   Neurological: He is alert and oriented to person, place, and time.   Skin: Skin is dry.       Significant Labs: All pertinent labs within the past 24 hours have been reviewed.    Significant Imaging: I have reviewed all pertinent imaging results/findings within the past 24 hours.    Assessment/Plan:      * Abdominal pain  Refused to take oral meds until he received IV dilaudid on admission  Explained to pt effects of opioids on bowel, pt demonstrated understanding.    5/13 will d/c IV pain medication and transition to po.  Will advance diet as tolerated.      Diverticulosis of large intestine without hemorrhage  Will continue cipro and flagyl  No CT scan findings  WBC 19.47 to 8.07, pt remains afebrile  5/13  will d/c flagyl due to nausea and pt remaining afebrile without leukocytosis      VTE Risk Mitigation         Ordered     High Risk of VTE  Once      05/12/17 1118          Amanda Tracey PA-C  Department of Hospital Medicine   [facility]

## 2017-05-13 NOTE — SUBJECTIVE & OBJECTIVE
Interval History: no acute events overnight; pt states more nausea and one episode of vomiting.  Abdominal pain remains unchanged.  Pt asking for vicodin.      Review of Systems   Constitutional: Positive for appetite change and fatigue. Negative for diaphoresis and fever.   HENT: Negative.    Eyes: Negative.    Respiratory: Negative for cough, choking, chest tightness and shortness of breath.    Cardiovascular: Negative.    Gastrointestinal: Positive for abdominal distention and abdominal pain. Negative for anal bleeding, constipation, diarrhea, nausea and vomiting.   Endocrine: Negative for cold intolerance, heat intolerance, polydipsia and polyphagia.   Genitourinary: Negative.    Musculoskeletal: Negative.    Neurological: Negative.    Hematological: Negative.      Objective:     Vital Signs (Most Recent):  Temp: 97.2 °F (36.2 °C) (05/13/17 1212)  Pulse: 75 (05/13/17 1212)  Resp: 20 (05/13/17 1212)  BP: 120/78 (05/13/17 1212)  SpO2: 96 % (05/13/17 1212) Vital Signs (24h Range):  Temp:  [97.2 °F (36.2 °C)-98.7 °F (37.1 °C)] 97.2 °F (36.2 °C)  Pulse:  [65-77] 75  Resp:  [16-20] 20  SpO2:  [91 %-99 %] 96 %  BP: (120-144)/(56-79) 120/78     Weight: 76.2 kg (168 lb)  Body mass index is 27.96 kg/(m^2).    Intake/Output Summary (Last 24 hours) at 05/13/17 1336  Last data filed at 05/13/17 0908   Gross per 24 hour   Intake              700 ml   Output              300 ml   Net              400 ml      Physical Exam   Constitutional: He is oriented to person, place, and time. He appears well-developed and well-nourished.   HENT:   Head: Normocephalic and atraumatic.   Eyes: EOM are normal. Pupils are equal, round, and reactive to light.   Neck: Normal range of motion.   Cardiovascular: Normal heart sounds and intact distal pulses.    Pulmonary/Chest: Effort normal and breath sounds normal.   Abdominal: He exhibits distension. There is tenderness in the right lower quadrant and left lower quadrant. There is no guarding.    Neurological: He is alert and oriented to person, place, and time.   Skin: Skin is dry.       Significant Labs: All pertinent labs within the past 24 hours have been reviewed.    Significant Imaging: I have reviewed all pertinent imaging results/findings within the past 24 hours.

## 2017-05-14 LAB
ALBUMIN SERPL BCP-MCNC: 3.5 G/DL
ALP SERPL-CCNC: 55 U/L
ALT SERPL W/O P-5'-P-CCNC: 37 U/L
ANION GAP SERPL CALC-SCNC: 9 MMOL/L
AST SERPL-CCNC: 27 U/L
BASOPHILS # BLD AUTO: 0.06 K/UL
BASOPHILS NFR BLD: 0.6 %
BILIRUB SERPL-MCNC: 0.5 MG/DL
BUN SERPL-MCNC: 10 MG/DL
CALCIUM SERPL-MCNC: 9.4 MG/DL
CHLORIDE SERPL-SCNC: 105 MMOL/L
CO2 SERPL-SCNC: 25 MMOL/L
CREAT SERPL-MCNC: 1.3 MG/DL
DIFFERENTIAL METHOD: ABNORMAL
EOSINOPHIL # BLD AUTO: 0.5 K/UL
EOSINOPHIL NFR BLD: 4.8 %
ERYTHROCYTE [DISTWIDTH] IN BLOOD BY AUTOMATED COUNT: 15.3 %
EST. GFR  (AFRICAN AMERICAN): >60 ML/MIN/1.73 M^2
EST. GFR  (NON AFRICAN AMERICAN): >60 ML/MIN/1.73 M^2
GLUCOSE SERPL-MCNC: 112 MG/DL
HCT VFR BLD AUTO: 42.6 %
HGB BLD-MCNC: 13.7 G/DL
LIPASE SERPL-CCNC: 22 U/L
LYMPHOCYTES # BLD AUTO: 3.5 K/UL
LYMPHOCYTES NFR BLD: 34 %
MCH RBC QN AUTO: 25.2 PG
MCHC RBC AUTO-ENTMCNC: 32.2 %
MCV RBC AUTO: 79 FL
MONOCYTES # BLD AUTO: 0.8 K/UL
MONOCYTES NFR BLD: 7.6 %
NEUTROPHILS # BLD AUTO: 5.4 K/UL
NEUTROPHILS NFR BLD: 52.7 %
PLATELET # BLD AUTO: 368 K/UL
PMV BLD AUTO: 8.8 FL
POTASSIUM SERPL-SCNC: 4.8 MMOL/L
PROT SERPL-MCNC: 7.1 G/DL
RBC # BLD AUTO: 5.43 M/UL
SODIUM SERPL-SCNC: 139 MMOL/L
WBC # BLD AUTO: 10.29 K/UL

## 2017-05-14 PROCEDURE — 25000003 PHARM REV CODE 250: Performed by: PHYSICIAN ASSISTANT

## 2017-05-14 PROCEDURE — G0378 HOSPITAL OBSERVATION PER HR: HCPCS

## 2017-05-14 PROCEDURE — 80053 COMPREHEN METABOLIC PANEL: CPT

## 2017-05-14 PROCEDURE — 63600175 PHARM REV CODE 636 W HCPCS: Performed by: INTERNAL MEDICINE

## 2017-05-14 PROCEDURE — 25000003 PHARM REV CODE 250: Performed by: INTERNAL MEDICINE

## 2017-05-14 PROCEDURE — 36415 COLL VENOUS BLD VENIPUNCTURE: CPT

## 2017-05-14 PROCEDURE — 25500020 PHARM REV CODE 255: Performed by: STUDENT IN AN ORGANIZED HEALTH CARE EDUCATION/TRAINING PROGRAM

## 2017-05-14 PROCEDURE — 99233 SBSQ HOSP IP/OBS HIGH 50: CPT | Mod: ,,, | Performed by: PHYSICIAN ASSISTANT

## 2017-05-14 PROCEDURE — 85025 COMPLETE CBC W/AUTO DIFF WBC: CPT

## 2017-05-14 PROCEDURE — 12000002 HC ACUTE/MED SURGE SEMI-PRIVATE ROOM

## 2017-05-14 PROCEDURE — 83690 ASSAY OF LIPASE: CPT

## 2017-05-14 PROCEDURE — 25500020 PHARM REV CODE 255: Performed by: INTERNAL MEDICINE

## 2017-05-14 RX ORDER — METOCLOPRAMIDE HYDROCHLORIDE 5 MG/ML
10 INJECTION INTRAMUSCULAR; INTRAVENOUS EVERY 6 HOURS PRN
Status: DISCONTINUED | OUTPATIENT
Start: 2017-05-14 | End: 2017-05-15 | Stop reason: HOSPADM

## 2017-05-14 RX ORDER — ONDANSETRON 2 MG/ML
4 INJECTION INTRAMUSCULAR; INTRAVENOUS EVERY 6 HOURS PRN
Status: DISCONTINUED | OUTPATIENT
Start: 2017-05-14 | End: 2017-05-14 | Stop reason: ALTCHOICE

## 2017-05-14 RX ORDER — METOCLOPRAMIDE HYDROCHLORIDE 5 MG/ML
10 INJECTION INTRAMUSCULAR; INTRAVENOUS EVERY 6 HOURS
Status: DISCONTINUED | OUTPATIENT
Start: 2017-05-14 | End: 2017-05-14

## 2017-05-14 RX ORDER — ONDANSETRON 2 MG/ML
4 INJECTION INTRAMUSCULAR; INTRAVENOUS EVERY 6 HOURS PRN
Status: DISCONTINUED | OUTPATIENT
Start: 2017-05-14 | End: 2017-05-15 | Stop reason: HOSPADM

## 2017-05-14 RX ORDER — METRONIDAZOLE 500 MG/100ML
500 INJECTION, SOLUTION INTRAVENOUS
Status: DISCONTINUED | OUTPATIENT
Start: 2017-05-14 | End: 2017-05-15 | Stop reason: HOSPADM

## 2017-05-14 RX ADMIN — METRONIDAZOLE 500 MG: 500 INJECTION, SOLUTION INTRAVENOUS at 06:05

## 2017-05-14 RX ADMIN — HYDROCODONE BITARTRATE AND ACETAMINOPHEN 1 TABLET: 7.5; 325 TABLET ORAL at 12:05

## 2017-05-14 RX ADMIN — DIAZEPAM 5 MG: 5 TABLET ORAL at 05:05

## 2017-05-14 RX ADMIN — CIPROFLOXACIN 400 MG: 2 INJECTION, SOLUTION INTRAVENOUS at 10:05

## 2017-05-14 RX ADMIN — IOHEXOL 75 ML: 350 INJECTION, SOLUTION INTRAVENOUS at 04:05

## 2017-05-14 RX ADMIN — IOHEXOL 15 ML: 350 INJECTION, SOLUTION INTRAVENOUS at 02:05

## 2017-05-14 RX ADMIN — DIAZEPAM 5 MG: 5 TABLET ORAL at 11:05

## 2017-05-14 RX ADMIN — SIMVASTATIN 40 MG: 40 TABLET, FILM COATED ORAL at 09:05

## 2017-05-14 RX ADMIN — CLOPIDOGREL 75 MG: 75 TABLET, FILM COATED ORAL at 08:05

## 2017-05-14 RX ADMIN — METRONIDAZOLE 500 MG: 500 INJECTION, SOLUTION INTRAVENOUS at 11:05

## 2017-05-14 RX ADMIN — LISINOPRIL 40 MG: 20 TABLET ORAL at 08:05

## 2017-05-14 RX ADMIN — DIAZEPAM 5 MG: 5 TABLET ORAL at 10:05

## 2017-05-14 RX ADMIN — BUPROPION HYDROCHLORIDE 150 MG: 150 TABLET, FILM COATED, EXTENDED RELEASE ORAL at 08:05

## 2017-05-14 RX ADMIN — RAMELTEON 8 MG: 8 TABLET, FILM COATED ORAL at 10:05

## 2017-05-14 RX ADMIN — HYDROCODONE BITARTRATE AND ACETAMINOPHEN 1 TABLET: 7.5; 325 TABLET ORAL at 11:05

## 2017-05-14 RX ADMIN — ALPRAZOLAM 0.25 MG: 0.25 TABLET ORAL at 09:05

## 2017-05-14 RX ADMIN — HYDROCODONE BITARTRATE AND ACETAMINOPHEN 1 TABLET: 7.5; 325 TABLET ORAL at 03:05

## 2017-05-14 RX ADMIN — CIPROFLOXACIN 400 MG: 2 INJECTION, SOLUTION INTRAVENOUS at 09:05

## 2017-05-14 RX ADMIN — HYDROCODONE BITARTRATE AND ACETAMINOPHEN 1 TABLET: 7.5; 325 TABLET ORAL at 08:05

## 2017-05-14 NOTE — PROGRESS NOTES
Patient continues to have complaints of abd pain. Pain is never relieved by prn interventions. No signs of distress noted and no acute events overnight. Pt ambulates independently in the gaspar and drinks coffee t/o the night.  Pt continues to have repeated requests about staying in the hospital.

## 2017-05-14 NOTE — ASSESSMENT & PLAN NOTE
Will continue cipro and flagyl  No CT scan findings  WBC 19.47 to 8.07, pt remains afebrile  5/13 will d/c flagyl due to nausea and pt remaining afebrile without leukocytosis  5/14 see above, will follow up on repeat CT

## 2017-05-14 NOTE — PROGRESS NOTES
"[facility] Hospital Medicine  Progress Note    Patient Name: Larry Lemon  MRN: [id number]  Patient Class: IP- Inpatient   Admission Date: 5/11/2017  Length of Stay: 0 days  Attending Physician: Lenny Noyola MD  Primary Care Provider: Primary Doctor [facility] Medicine Team: [facility] F Amanda Tracey PA-C    Subjective:     Principal Problem:Abdominal pain    HPI:  51-year-old male presents to the ER with the chief complaint of nausea, vomiting, chest discomfort and R sided abdominal pain. Symptoms ×2 days. He denies any fever or chills. He has no change in bowel or bladder.     His chest pain is nonexertional. His chest pain is nonradiating. He denies any shortness of breath.  The history is provided by the patient.     WBC is 9. CT scan with no obvious source diverticular seen but no diverticulitis.         Hospital Course:  Pt admitted to observation for N/V and abdominal pain x 3 days.  No acute findings on CT.  Pt started on cipro and flagyl due to WBC 19.47 and history of diverticulosis.   WBC 19.47->8.07->9.85.  5/13 pt c/o nausea and vomiting x 1, limited po intake.  Will d/c flagyl.  Pt agreeable to d/c IV pain medication and transition to po.  5/14 WBC trending up 8.07->9.85->10.29.  Pt continues to report limited po intake.  Strict I&Os.  Will repeat CT abdomen.    Interval History: no acute events overnight; pt reports unable to eat dinner due to nausea and "burning."  B/L lower abdominal quadrant pain persists.    Review of Systems   Constitutional: Positive for appetite change and fatigue. Negative for diaphoresis and fever.   HENT: Negative.    Eyes: Negative.    Respiratory: Negative for cough, choking, chest tightness and shortness of breath.    Cardiovascular: Negative.    Gastrointestinal: Positive for abdominal distention, abdominal pain, nausea and vomiting. Negative for anal bleeding, constipation and diarrhea.   Endocrine: Negative for cold " intolerance, heat intolerance, polydipsia and polyphagia.   Genitourinary: Negative.    Musculoskeletal: Negative.    Neurological: Negative.    Hematological: Negative.      Objective:     Vital Signs (Most Recent):  Temp: 97.9 °F (36.6 °C) (05/14/17 0814)  Pulse: 86 (05/14/17 0814)  Resp: 18 (05/14/17 0814)  BP: 138/82 (05/14/17 0814)  SpO2: 97 % (05/14/17 0814) Vital Signs (24h Range):  Temp:  [97.2 °F (36.2 °C)-98.8 °F (37.1 °C)] 97.9 °F (36.6 °C)  Pulse:  [70-97] 86  Resp:  [18-20] 18  SpO2:  [96 %-98 %] 97 %  BP: (115-138)/(73-88) 138/82     Weight: 76.2 kg (168 lb)  Body mass index is 27.96 kg/(m^2).  No intake or output data in the 24 hours ending 05/14/17 0951   Physical Exam   Constitutional: He is oriented to person, place, and time. He appears well-developed and well-nourished.   HENT:   Head: Normocephalic and atraumatic.   Eyes: EOM are normal. Pupils are equal, round, and reactive to light.   Neck: Normal range of motion.   Cardiovascular: Normal heart sounds and intact distal pulses.    Pulmonary/Chest: Effort normal and breath sounds normal.   Abdominal: He exhibits distension. There is tenderness in the right lower quadrant and left lower quadrant. There is no guarding.   Neurological: He is alert and oriented to person, place, and time.   Skin: Skin is dry.       Significant Labs: All pertinent labs within the past 24 hours have been reviewed.    Significant Imaging: I have reviewed all pertinent imaging results/findings within the past 24 hours.    Assessment/Plan:      * Abdominal pain  Refused to take oral meds until he received IV dilaudid on admission  Explained to pt effects of opioids on bowel on a daily basis since admission, pt demonstrated understanding.    5/13 will d/c IV pain medication and transition to po.  Will advance diet as tolerated.  5/14 WBC trending up, will resume flagyl IV.  Will repeat CT scan of abdomen.  Pt changed to inpatient due to need for IV antibiotics.  Case  discussed with Dr. Noyola.    Diverticulosis of large intestine without hemorrhage  Will continue cipro and flagyl  No CT scan findings  WBC 19.47 to 8.07, pt remains afebrile  5/13 will d/c flagyl due to nausea and pt remaining afebrile without leukocytosis  5/14 see above, will follow up on repeat CT      History of cerebral infarction  Continue plavix      Essential hypertension  Continue lisinopril, controlled      Hyperlipidemia  Continue statin      VTE Risk Mitigation         Ordered     High Risk of VTE  Once      05/12/17 0934          Amanda Tracey PA-C  Department of Hospital Medicine   [facility]

## 2017-05-14 NOTE — ASSESSMENT & PLAN NOTE
Refused to take oral meds until he received IV dilaudid on admission  Explained to pt effects of opioids on bowel on a daily basis since admission, pt demonstrated understanding.    5/13 will d/c IV pain medication and transition to po.  Will advance diet as tolerated.  5/14 WBC trending up, will resume flagyl IV.  Will repeat CT scan of abdomen.  Pt changed to inpatient due to need for IV antibiotics.  Case discussed with Dr. Noyola.

## 2017-05-14 NOTE — SUBJECTIVE & OBJECTIVE
"Interval History: no acute events overnight; pt reports unable to eat dinner due to nausea and "burning."  B/L lower abdominal quadrant pain persists.    Review of Systems   Constitutional: Positive for appetite change and fatigue. Negative for diaphoresis and fever.   HENT: Negative.    Eyes: Negative.    Respiratory: Negative for cough, choking, chest tightness and shortness of breath.    Cardiovascular: Negative.    Gastrointestinal: Positive for abdominal distention, abdominal pain, nausea and vomiting. Negative for anal bleeding, constipation and diarrhea.   Endocrine: Negative for cold intolerance, heat intolerance, polydipsia and polyphagia.   Genitourinary: Negative.    Musculoskeletal: Negative.    Neurological: Negative.    Hematological: Negative.      Objective:     Vital Signs (Most Recent):  Temp: 97.9 °F (36.6 °C) (05/14/17 0814)  Pulse: 86 (05/14/17 0814)  Resp: 18 (05/14/17 0814)  BP: 138/82 (05/14/17 0814)  SpO2: 97 % (05/14/17 0814) Vital Signs (24h Range):  Temp:  [97.2 °F (36.2 °C)-98.8 °F (37.1 °C)] 97.9 °F (36.6 °C)  Pulse:  [70-97] 86  Resp:  [18-20] 18  SpO2:  [96 %-98 %] 97 %  BP: (115-138)/(73-88) 138/82     Weight: 76.2 kg (168 lb)  Body mass index is 27.96 kg/(m^2).  No intake or output data in the 24 hours ending 05/14/17 0951   Physical Exam   Constitutional: He is oriented to person, place, and time. He appears well-developed and well-nourished.   HENT:   Head: Normocephalic and atraumatic.   Eyes: EOM are normal. Pupils are equal, round, and reactive to light.   Neck: Normal range of motion.   Cardiovascular: Normal heart sounds and intact distal pulses.    Pulmonary/Chest: Effort normal and breath sounds normal.   Abdominal: He exhibits distension. There is tenderness in the right lower quadrant and left lower quadrant. There is no guarding.   Neurological: He is alert and oriented to person, place, and time.   Skin: Skin is dry.       Significant Labs: All pertinent labs within the " past 24 hours have been reviewed.    Significant Imaging: I have reviewed all pertinent imaging results/findings within the past 24 hours.

## 2017-05-15 VITALS
RESPIRATION RATE: 18 BRPM | BODY MASS INDEX: 27.99 KG/M2 | HEIGHT: 65 IN | TEMPERATURE: 98 F | OXYGEN SATURATION: 96 % | WEIGHT: 168 LBS | DIASTOLIC BLOOD PRESSURE: 74 MMHG | SYSTOLIC BLOOD PRESSURE: 124 MMHG | HEART RATE: 87 BPM

## 2017-05-15 LAB
ALBUMIN SERPL BCP-MCNC: 3.4 G/DL
ALP SERPL-CCNC: 54 U/L
ALT SERPL W/O P-5'-P-CCNC: 33 U/L
ANION GAP SERPL CALC-SCNC: 9 MMOL/L
AST SERPL-CCNC: 21 U/L
BASOPHILS # BLD AUTO: 0.07 K/UL
BASOPHILS NFR BLD: 0.7 %
BILIRUB SERPL-MCNC: 0.6 MG/DL
BUN SERPL-MCNC: 13 MG/DL
CALCIUM SERPL-MCNC: 9.1 MG/DL
CHLORIDE SERPL-SCNC: 105 MMOL/L
CO2 SERPL-SCNC: 23 MMOL/L
CREAT SERPL-MCNC: 1.2 MG/DL
DIFFERENTIAL METHOD: ABNORMAL
EOSINOPHIL # BLD AUTO: 0.5 K/UL
EOSINOPHIL NFR BLD: 4.4 %
ERYTHROCYTE [DISTWIDTH] IN BLOOD BY AUTOMATED COUNT: 15.3 %
EST. GFR  (AFRICAN AMERICAN): >60 ML/MIN/1.73 M^2
EST. GFR  (NON AFRICAN AMERICAN): >60 ML/MIN/1.73 M^2
GLUCOSE SERPL-MCNC: 96 MG/DL
HCT VFR BLD AUTO: 43.6 %
HGB BLD-MCNC: 13.9 G/DL
LYMPHOCYTES # BLD AUTO: 3.9 K/UL
LYMPHOCYTES NFR BLD: 36.4 %
MCH RBC QN AUTO: 24.9 PG
MCHC RBC AUTO-ENTMCNC: 31.9 %
MCV RBC AUTO: 78 FL
MONOCYTES # BLD AUTO: 0.8 K/UL
MONOCYTES NFR BLD: 7 %
NEUTROPHILS # BLD AUTO: 5.5 K/UL
NEUTROPHILS NFR BLD: 51.3 %
PLATELET # BLD AUTO: 387 K/UL
PMV BLD AUTO: 8.9 FL
POTASSIUM SERPL-SCNC: 4.3 MMOL/L
PROT SERPL-MCNC: 6.8 G/DL
RBC # BLD AUTO: 5.59 M/UL
SODIUM SERPL-SCNC: 137 MMOL/L
WBC # BLD AUTO: 10.72 K/UL

## 2017-05-15 PROCEDURE — 63600175 PHARM REV CODE 636 W HCPCS: Performed by: INTERNAL MEDICINE

## 2017-05-15 PROCEDURE — 85025 COMPLETE CBC W/AUTO DIFF WBC: CPT

## 2017-05-15 PROCEDURE — 36415 COLL VENOUS BLD VENIPUNCTURE: CPT

## 2017-05-15 PROCEDURE — 80053 COMPREHEN METABOLIC PANEL: CPT

## 2017-05-15 PROCEDURE — 99239 HOSP IP/OBS DSCHRG MGMT >30: CPT | Mod: ,,, | Performed by: PHYSICIAN ASSISTANT

## 2017-05-15 PROCEDURE — 25000003 PHARM REV CODE 250: Performed by: INTERNAL MEDICINE

## 2017-05-15 PROCEDURE — G0378 HOSPITAL OBSERVATION PER HR: HCPCS

## 2017-05-15 PROCEDURE — 25000003 PHARM REV CODE 250: Performed by: PHYSICIAN ASSISTANT

## 2017-05-15 RX ORDER — CLOPIDOGREL BISULFATE 75 MG/1
75 TABLET ORAL DAILY
Qty: 30 TABLET | Refills: 1 | Status: SHIPPED | OUTPATIENT
Start: 2017-05-15 | End: 2017-05-22 | Stop reason: SDUPTHER

## 2017-05-15 RX ORDER — CIPROFLOXACIN 500 MG/1
500 TABLET ORAL 2 TIMES DAILY
Qty: 5 TABLET | Refills: 0 | Status: SHIPPED | OUTPATIENT
Start: 2017-05-15 | End: 2017-05-22

## 2017-05-15 RX ORDER — LISINOPRIL 40 MG/1
40 TABLET ORAL DAILY
Qty: 30 TABLET | Refills: 1
Start: 2017-05-15 | End: 2017-05-22 | Stop reason: SDUPTHER

## 2017-05-15 RX ORDER — HYDROCODONE BITARTRATE AND ACETAMINOPHEN 7.5; 325 MG/1; MG/1
1 TABLET ORAL EVERY 6 HOURS PRN
Qty: 6 TABLET | Refills: 0 | Status: SHIPPED | OUTPATIENT
Start: 2017-05-15 | End: 2017-05-17

## 2017-05-15 RX ADMIN — METRONIDAZOLE 500 MG: 500 INJECTION, SOLUTION INTRAVENOUS at 11:05

## 2017-05-15 RX ADMIN — HYDROCODONE BITARTRATE AND ACETAMINOPHEN 1 TABLET: 7.5; 325 TABLET ORAL at 08:05

## 2017-05-15 RX ADMIN — CLOPIDOGREL 75 MG: 75 TABLET, FILM COATED ORAL at 08:05

## 2017-05-15 RX ADMIN — DIAZEPAM 5 MG: 5 TABLET ORAL at 04:05

## 2017-05-15 RX ADMIN — DIAZEPAM 5 MG: 5 TABLET ORAL at 11:05

## 2017-05-15 RX ADMIN — METRONIDAZOLE 500 MG: 500 INJECTION, SOLUTION INTRAVENOUS at 03:05

## 2017-05-15 RX ADMIN — ALPRAZOLAM 0.25 MG: 0.25 TABLET ORAL at 12:05

## 2017-05-15 RX ADMIN — BUPROPION HYDROCHLORIDE 150 MG: 150 TABLET, FILM COATED, EXTENDED RELEASE ORAL at 08:05

## 2017-05-15 RX ADMIN — LISINOPRIL 40 MG: 20 TABLET ORAL at 08:05

## 2017-05-15 RX ADMIN — CIPROFLOXACIN 400 MG: 2 INJECTION, SOLUTION INTRAVENOUS at 10:05

## 2017-05-15 NOTE — DISCHARGE SUMMARY
[facility] Hospital Medicine  Discharge Summary      Patient Name: Larry Lemon  MRN: [id number]  Admission Date: 5/11/2017  Hospital Length of Stay: 1 days  Discharge Date and Time:  05/15/2017 12:47 PM  Attending Physician: Lenny Noyola MD   Discharging Provider: Amanda Tracey PA-C  Primary Care Provider: Primary Doctor [facility] Medicine Team: [facility] MED F Amanda Tracey PA-C    HPI:   51-year-old male presents to the ER with the chief complaint of nausea, vomiting, chest discomfort and R sided abdominal pain. Symptoms ×2 days. He denies any fever or chills. He has no change in bowel or bladder.     His chest pain is nonexertional. His chest pain is nonradiating. He denies any shortness of breath.  The history is provided by the patient.     WBC is 9. CT scan with no obvious source diverticular seen but no diverticulitis.         * No surgery found *      Indwelling Lines/Drains at time of discharge:   Lines/Drains/Airways          No matching active lines, drains, or airways        Hospital Course:   Pt admitted to observation for N/V and abdominal pain x 3 days.  No acute findings on CT.  Pt started on cipro and flagyl due to WBC 19.47 and history of diverticulosis.   WBC 19.47->8.07->9.85.  5/13 pt c/o nausea and vomiting x 1, limited po intake.  D/C'd flagyl.  Pt agreeable to d/c IV pain medication and transition to po.  5/14 WBC trending up 8.07->9.85->10.29.  Pt continues to report limited po intake.  Strict I&Os.  Repeat CT abdomen shows 1. No acute process identified in the abdomen and pelvis.  2. Small hiatal hernia and mild circumferential wall thickening of the distal esophagus. Further evaluation with EGD may be helpful.  3. Colonic diverticulosis without evidence of diverticulitis.  Referral placed to GI as outpatient.  Tox screen positive for benzo, THC, cocaine.  Cocaine use PTA could have contributed to leukocytosis and ischemic bowel.  Pt tolerating po  and abdominal pain improved day of discharge.  Pt to complete 5 doses of cipro for total of 14 doses for possible diverticulitis.  Pt to follow up with PCP and GI.     Consults:     Significant Diagnostic Studies: Labs: All labs within the past 24 hours have been reviewed    Pending Diagnostic Studies:     None        Final Active Diagnoses:    Diagnosis Date Noted POA    PRINCIPAL PROBLEM:  Abdominal pain [R10.9] 02/23/2017 Yes    Diverticulosis of large intestine without hemorrhage [K57.30] 05/11/2017 Yes    History of cerebral infarction [Z86.73] 11/06/2014 Not Applicable     Chronic    Essential hypertension [I10] 11/06/2014 Yes    Hyperlipidemia [E78.5] 11/06/2014 Yes      Problems Resolved During this Admission:    Diagnosis Date Noted Date Resolved POA      No new Assessment & Plan notes have been filed under this hospital service since the last note was generated.  Service: Hospital Medicine      Discharged Condition: good    Disposition: Home or Self Care    Follow Up:  Follow-up Information     Follow up with Vicente Hua MD On 5/22/2017.    Specialty:  Internal Medicine    Why:  at 10:40 AM at [address] [place] [address].  Arrive 15 minutes early.    Contact information:    [address]  [place] [address]  [phone]          Patient Instructions:     Ambulatory referral to Gastroenterology   Referral Priority: Routine Referral Type: Consultation   Referral Reason: Specialty Services Required    Requested Specialty: Gastroenterology    Number of Visits Requested: 1      Diet general   Order Specific Question Answer Comments   Additional restrictions: Light/GI Soft      Activity as tolerated     Call MD for:  temperature >100.4     Call MD for:  persistent nausea and vomiting or diarrhea       Medications:  Reconciled Home Medications:   Current Discharge Medication List      START taking these medications    Details   ciprofloxacin HCl (CIPRO) 500 MG tablet Take 1 tablet (500 mg  total) by mouth 2 (two) times daily.  Qty: 5 tablet, Refills: 0      hydrocodone-acetaminophen 7.5-325mg (NORCO) 7.5-325 mg per tablet Take 1 tablet by mouth every 6 (six) hours as needed for Pain.  Qty: 6 tablet, Refills: 0         CONTINUE these medications which have CHANGED    Details   clopidogrel (PLAVIX) 75 mg tablet Take 1 tablet (75 mg total) by mouth once daily.  Qty: 30 tablet, Refills: 1      lisinopril (PRINIVIL,ZESTRIL) 40 MG tablet Take 1 tablet (40 mg total) by mouth once daily.  Qty: 30 tablet, Refills: 1         CONTINUE these medications which have NOT CHANGED    Details   baclofen (LIORESAL) 10 MG tablet Take 1-2 Q HS for spasms  Qty: 60 tablet, Refills: 6      buPROPion (WELLBUTRIN XL) 150 MG TB24 tablet Take 1 tablet (150 mg total) by mouth once daily.  Qty: 30 tablet, Refills: 0      diazePAM (VALIUM) 5 MG tablet Take 1 tablet (5 mg total) by mouth every 6 (six) hours as needed (muscle spasm).  Qty: 10 tablet, Refills: 0      hydrocodone-acetaminophen 5-325mg (NORCO) 5-325 mg per tablet Take 1 tablet by mouth every 6 (six) hours as needed for Pain.  Qty: 12 tablet, Refills: 0    Associated Diagnoses: Kidney stone      mirtazapine (REMERON SOL-TAB) 30 MG disintegrating tablet Take 1 tablet (30 mg total) by mouth nightly.  Qty: 30 tablet, Refills: 0      simvastatin (ZOCOR) 40 MG tablet Take 1 tablet (40 mg total) by mouth every evening.  Qty: 30 tablet, Refills: 5      tamsulosin (FLOMAX) 0.4 mg Cp24 Take 1 capsule (0.4 mg total) by mouth once daily.  Qty: 10 capsule, Refills: 0      varenicline (CHANTIX) 1 mg Tab Take 1 tablet (1 mg total) by mouth 2 (two) times daily.  Qty: 60 tablet, Refills: 1           Time spent on the discharge of patient: >30 minutes    Amanda Tracey PA-C  Department of Hospital Medicine  [facility]

## 2017-05-17 ENCOUNTER — NURSE TRIAGE (OUTPATIENT)
Dept: ADMINISTRATIVE | Facility: CLINIC | Age: 52
End: 2017-05-17

## 2017-05-17 ENCOUNTER — HOSPITAL ENCOUNTER (EMERGENCY)
Facility: OTHER | Age: 52
Discharge: HOME | End: 2017-05-17
Attending: EMERGENCY MEDICINE
Payer: MEDICAID

## 2017-05-17 VITALS
DIASTOLIC BLOOD PRESSURE: 87 MMHG | TEMPERATURE: 98 F | RESPIRATION RATE: 18 BRPM | BODY MASS INDEX: 27.96 KG/M2 | HEART RATE: 79 BPM | WEIGHT: 168 LBS | SYSTOLIC BLOOD PRESSURE: 129 MMHG | OXYGEN SATURATION: 100 %

## 2017-05-17 DIAGNOSIS — K59.00 CONSTIPATION, UNSPECIFIED CONSTIPATION TYPE: ICD-10-CM

## 2017-05-17 DIAGNOSIS — R10.9 ABDOMINAL PAIN: Primary | ICD-10-CM

## 2017-05-17 LAB
ALBUMIN SERPL-MCNC: 3.1 G/DL (ref 3.3–5.5)
ALBUMIN SERPL-MCNC: 3.2 G/DL (ref 3.3–5.5)
ALP SERPL-CCNC: 63 U/L (ref 42–141)
ALP SERPL-CCNC: 70 U/L (ref 42–141)
BILIRUB SERPL-MCNC: 0.4 MG/DL (ref 0.2–1.6)
BILIRUB SERPL-MCNC: 0.4 MG/DL (ref 0.2–1.6)
BILIRUBIN, POC UA: NEGATIVE
BLOOD, POC UA: ABNORMAL
BUN SERPL-MCNC: 15 MG/DL (ref 7–22)
CALCIUM SERPL-MCNC: 8.5 MG/DL (ref 8–10.3)
CHLORIDE SERPL-SCNC: 102 MMOL/L (ref 98–108)
CLARITY, POC UA: CLEAR
COLOR, POC UA: YELLOW
CREAT SERPL-MCNC: 1 MG/DL (ref 0.6–1.2)
GLUCOSE SERPL-MCNC: 104 MG/DL (ref 73–118)
GLUCOSE, POC UA: NEGATIVE
KETONES, POC UA: NEGATIVE
LEUKOCYTE EST, POC UA: NEGATIVE
NITRITE, POC UA: NEGATIVE
PH UR STRIP: 6 [PH]
POC ALT (SGPT): 25 U/L (ref 10–47)
POC ALT (SGPT): 32 U/L (ref 10–47)
POC AMYLASE: 60 U/L (ref 14–97)
POC AST (SGOT): 26 U/L (ref 11–38)
POC AST (SGOT): 27 U/L (ref 11–38)
POC CARDIAC TROPONIN I: 0 NG/ML
POC GGT: 33 U/L (ref 5–65)
POC TCO2: 24 MMOL/L (ref 18–33)
POTASSIUM BLD-SCNC: 4.3 MMOL/L (ref 3.6–5.1)
PROTEIN, POC UA: NEGATIVE
PROTEIN, POC: 6.7 G/DL (ref 6.4–8.1)
PROTEIN, POC: 6.8 G/DL (ref 6.4–8.1)
SAMPLE: NORMAL
SODIUM BLD-SCNC: 139 MMOL/L (ref 128–145)
SPECIFIC GRAVITY, POC UA: >=1.03
UROBILINOGEN, POC UA: 0.2 E.U./DL

## 2017-05-17 PROCEDURE — 96372 THER/PROPH/DIAG INJ SC/IM: CPT

## 2017-05-17 PROCEDURE — 25000003 PHARM REV CODE 250: Performed by: EMERGENCY MEDICINE

## 2017-05-17 PROCEDURE — 93010 ELECTROCARDIOGRAM REPORT: CPT | Mod: ,,, | Performed by: INTERNAL MEDICINE

## 2017-05-17 PROCEDURE — 99284 EMERGENCY DEPT VISIT MOD MDM: CPT | Mod: 25

## 2017-05-17 PROCEDURE — 63600175 PHARM REV CODE 636 W HCPCS: Performed by: EMERGENCY MEDICINE

## 2017-05-17 PROCEDURE — 93005 ELECTROCARDIOGRAM TRACING: CPT

## 2017-05-17 PROCEDURE — 96360 HYDRATION IV INFUSION INIT: CPT

## 2017-05-17 PROCEDURE — 96361 HYDRATE IV INFUSION ADD-ON: CPT

## 2017-05-17 RX ORDER — LACTULOSE 10 G/15ML
15 SOLUTION ORAL 2 TIMES DAILY
Qty: 450 ML | Refills: 0 | Status: SHIPPED | OUTPATIENT
Start: 2017-05-17 | End: 2017-05-27

## 2017-05-17 RX ORDER — PROMETHAZINE HYDROCHLORIDE 25 MG/1
25 SUPPOSITORY RECTAL EVERY 6 HOURS PRN
Qty: 15 SUPPOSITORY | Refills: 0 | Status: SHIPPED | OUTPATIENT
Start: 2017-05-17 | End: 2017-05-22

## 2017-05-17 RX ORDER — ACETAMINOPHEN 500 MG
500 TABLET ORAL EVERY 6 HOURS PRN
Qty: 30 TABLET | Refills: 0 | Status: SHIPPED | OUTPATIENT
Start: 2017-05-17 | End: 2017-06-29 | Stop reason: SDUPTHER

## 2017-05-17 RX ORDER — ACETAMINOPHEN 325 MG/1
650 TABLET ORAL
Status: COMPLETED | OUTPATIENT
Start: 2017-05-17 | End: 2017-05-17

## 2017-05-17 RX ORDER — PROMETHAZINE HYDROCHLORIDE 25 MG/ML
25 INJECTION, SOLUTION INTRAMUSCULAR; INTRAVENOUS
Status: COMPLETED | OUTPATIENT
Start: 2017-05-17 | End: 2017-05-17

## 2017-05-17 RX ORDER — HYDROCODONE BITARTRATE AND ACETAMINOPHEN 5; 325 MG/1; MG/1
1 TABLET ORAL
Status: DISCONTINUED | OUTPATIENT
Start: 2017-05-17 | End: 2017-05-17

## 2017-05-17 RX ORDER — DICYCLOMINE HYDROCHLORIDE 10 MG/ML
20 INJECTION INTRAMUSCULAR
Status: COMPLETED | OUTPATIENT
Start: 2017-05-17 | End: 2017-05-17

## 2017-05-17 RX ORDER — DICYCLOMINE HYDROCHLORIDE 20 MG/1
20 TABLET ORAL
Qty: 40 TABLET | Refills: 0 | Status: SHIPPED | OUTPATIENT
Start: 2017-05-17 | End: 2017-06-16

## 2017-05-17 RX ORDER — PROMETHAZINE HYDROCHLORIDE 25 MG/1
25 TABLET ORAL EVERY 6 HOURS PRN
Qty: 30 TABLET | Refills: 0 | Status: SHIPPED | OUTPATIENT
Start: 2017-05-17 | End: 2017-05-22

## 2017-05-17 RX ADMIN — ACETAMINOPHEN 650 MG: 325 TABLET ORAL at 06:05

## 2017-05-17 RX ADMIN — DICYCLOMINE HYDROCHLORIDE 20 MG: 20 INJECTION, SOLUTION INTRAMUSCULAR at 04:05

## 2017-05-17 RX ADMIN — PROMETHAZINE HYDROCHLORIDE 25 MG: 25 INJECTION INTRAMUSCULAR; INTRAVENOUS at 04:05

## 2017-05-17 RX ADMIN — SODIUM CHLORIDE 1000 ML: 0.9 INJECTION, SOLUTION INTRAVENOUS at 04:05

## 2017-05-17 NOTE — ED NOTES
Patient has verified the spelling of their name and  on armband    LOC: The patient is awake, alert, and aware of environment with an appropriate affect, the patient is oriented x 4 and speaking appropriately.     APPEARANCE: Patient resting comfortably and in no acute distress, patient is clean and well groomed, patient's clothing is properly fastened.     RESPIRATORY: Airway is open and patent, respirations are spontaneous, patient has a normal effort and rate, no accessory muscle use noted, bilateral breath sounds clear, denies SOB     CARDIAC:  No chest pain, chest pressure or chest discomfort. No palpitations.     HEENT: No visual loss, blurred vision, double vision or yellow sclera. No hearing loss, sneezing, congestion, runny nose or sore throat.    GASTROINTESTINAL: +Nausea and +vomiting. Pain to LLQ and RLQ    SKIN: The skin is warm and dry, color consistent with ethnicity, patient has normal skin turgor and moist mucus membranes, skin intact, no breakdown or bruising noted.     GENITOURINARY: Voids without difficulty    NEUROLOGICAL: No headache, dizziness, syncope, paralysis, ataxia, numbness or tingling in the extremities.     MUSCULOSKELETAL: Patient moving all extremities spontaneously, no obvious swelling or deformities noted.     COMPLAINT: Patient complain of abdominal pain x 1 week.

## 2017-05-17 NOTE — TELEPHONE ENCOUNTER
Reason for Disposition   Caller has medication question, adult has minor symptoms, caller declines triage, and triager answers question    Answer Assessment - Initial Assessment Questions  Pt recently discharged from hospital for n/v, abdominal pain. Possible diverticulitis. Was given 6 norco for pain on discharge. Reported he is out of the  Norco. Is requesting scrip for pain med and for valium which he said they were giving him in the hospital for his stomach for which he reports he is still having pain.  Has appt to establish care on Monday.    Protocols used: ST MEDICATION QUESTION CALL-A-    Advised pt if still having pain and other sx's requesting pain med/valium would have to recommend he return to ER. He advised he will see us in the office Monday.

## 2017-05-17 NOTE — ED AVS SNAPSHOT
[facility] EMERGENCY DEPARTMENT  [address]  [facility] [address]               Larry Lemon   2017  3:52 PM   ED    Description:  Male : 1965   Department:  [facility] Emergency Department           Your Care was Coordinated By:     Provider Role From To    Tanesha Vang DO Attending Provider [id number] --      Reason for Visit     Abdominal Pain           Diagnoses this Visit        Comments    Abdominal pain    -  Primary     Constipation, unspecified constipation type           ED Disposition     ED Disposition Condition Comment    Discharge             To Do List           Follow-up Information     Follow up with Vicente Hua MD. Schedule an appointment as soon as possible for a visit in 1 day.    Specialty:  Internal Medicine    Contact information:    [address] [place] [address] [id number] [phone]         These Medications        Disp Refills Start End    dicyclomine (BENTYL) 20 mg tablet 40 tablet 0 2017    Take 1 tablet (20 mg total) by mouth 4 (four) times daily before meals and nightly. - Oral    Pharmacy: [facility] - [place], [place] - [address] Ph #: [phone]       promethazine (PHENERGAN) 25 MG suppository 15 suppository 0 2017     Place 1 suppository (25 mg total) rectally every 6 (six) hours as needed for Nausea (Use of nausea vomiting or not controlled with oral medications). - Rectal    Pharmacy: [facility] - [place], [place] - [address] Ph #: [phone]       promethazine (PHENERGAN) 25 MG tablet 30 tablet 0 2017     Take 1 tablet (25 mg total) by mouth every 6 (six) hours as needed for Nausea. - Oral    Pharmacy: [facility] - [place], [place] - [address] Ph #: [phone]       acetaminophen (TYLENOL) 500 MG tablet 30 tablet 0 2017     Take 1 tablet (500 mg total) by mouth every 6 (six)  hours as needed for Pain. - Oral    Pharmacy: [facility] - [place], [place] - [address] Ph #: [phone]       lactulose (CHRONULAC) 20 gram/30 mL Soln 450 mL 0 5/17/2017 5/27/2017    Take 22.5 mLs (15 g total) by mouth 2 (two) times daily. Take twice a day as needed for constipation - Oral    Pharmacy: [facility] - [place], [place] - [address] Ph #: [phone]         [facility]     [facility] On Call Nurse Care Line - 24/7 Assistance  Unless otherwise directed by your provider, please contact [facility], our nurse care line that is available for 24/7 assistance.     Registered nurses in the [facility] provide: appointment scheduling, clinical advisement, health education, and other advisory services.  Call: [phone] (toll free)               Medications           Message regarding Medications     Verify the changes and/or additions to your medication regime listed below are the same as discussed with your clinician today.  If any of these changes or additions are incorrect, please notify your healthcare provider.        START taking these NEW medications        Refills    dicyclomine (BENTYL) 20 mg tablet 0    Sig: Take 1 tablet (20 mg total) by mouth 4 (four) times daily before meals and nightly.    Class: Print    Route: Oral    promethazine (PHENERGAN) 25 MG suppository 0    Sig: Place 1 suppository (25 mg total) rectally every 6 (six) hours as needed for Nausea (Use of nausea vomiting or not controlled with oral medications).    Class: Print    Route: Rectal    promethazine (PHENERGAN) 25 MG tablet 0    Sig: Take 1 tablet (25 mg total) by mouth every 6 (six) hours as needed for Nausea.    Class: Print    Route: Oral    acetaminophen (TYLENOL) 500 MG tablet 0    Sig: Take 1 tablet (500 mg total) by mouth every 6 (six) hours as needed for Pain.    Class: Print    Route: Oral    lactulose (CHRONULAC) 20 gram/30  mL Soln 0    Sig: Take 22.5 mLs (15 g total) by mouth 2 (two) times daily. Take twice a day as needed for constipation    Class: Print    Route: Oral      These medications were administered today        Dose Freq    dicyclomine injection 20 mg 20 mg ED 1 Time    Sig: Inject 2 mLs (20 mg total) into the muscle ED 1 Time.    Class: Normal    Route: Intramuscular    promethazine injection 25 mg 25 mg ED 1 Time    Sig: Inject 1 mL (25 mg total) into the muscle ED 1 Time.    Class: Normal    Route: Intramuscular    sodium chloride 0.9% bolus 1,000 mL 1,000 mL ED 1 Time    Sig: Inject 1,000 mLs into the vein ED 1 Time.    Class: Normal    Route: Intravenous    acetaminophen tablet 650 mg 650 mg ED 1 Time    Sig: Take 2 tablets (650 mg total) by mouth ED 1 Time.    Class: Normal    Route: Oral      STOP taking these medications     hydrocodone-acetaminophen 7.5-325mg (NORCO) 7.5-325 mg per tablet Take 1 tablet by mouth every 6 (six) hours as needed for Pain.    hydrocodone-acetaminophen 5-325mg (NORCO) 5-325 mg per tablet Take 1 tablet by mouth every 6 (six) hours as needed for Pain.           Verify that the below list of medications is an accurate representation of the medications you are currently taking.  If none reported, the list may be blank. If incorrect, please contact your healthcare provider. Carry this list with you in case of emergency.           Current Medications     acetaminophen (TYLENOL) 500 MG tablet Take 1 tablet (500 mg total) by mouth every 6 (six) hours as needed for Pain.    baclofen (LIORESAL) 10 MG tablet Take 1-2 Q HS for spasms    buPROPion (WELLBUTRIN XL) 150 MG TB24 tablet Take 1 tablet (150 mg total) by mouth once daily.    ciprofloxacin HCl (CIPRO) 500 MG tablet Take 1 tablet (500 mg total) by mouth 2 (two) times daily.    clopidogrel (PLAVIX) 75 mg tablet Take 1 tablet (75 mg total) by mouth once daily.    diazePAM (VALIUM) 5 MG tablet Take 1 tablet (5 mg total) by mouth every 6 (six)  hours as needed (muscle spasm).    dicyclomine (BENTYL) 20 mg tablet Take 1 tablet (20 mg total) by mouth 4 (four) times daily before meals and nightly.    lactulose (CHRONULAC) 20 gram/30 mL Soln Take 22.5 mLs (15 g total) by mouth 2 (two) times daily. Take twice a day as needed for constipation    lisinopril (PRINIVIL,ZESTRIL) 40 MG tablet Take 1 tablet (40 mg total) by mouth once daily.    mirtazapine (REMERON SOL-TAB) 30 MG disintegrating tablet Take 1 tablet (30 mg total) by mouth nightly.    promethazine (PHENERGAN) 25 MG suppository Place 1 suppository (25 mg total) rectally every 6 (six) hours as needed for Nausea (Use of nausea vomiting or not controlled with oral medications).    promethazine (PHENERGAN) 25 MG tablet Take 1 tablet (25 mg total) by mouth every 6 (six) hours as needed for Nausea.    simvastatin (ZOCOR) 40 MG tablet Take 1 tablet (40 mg total) by mouth every evening.    tamsulosin (FLOMAX) 0.4 mg Cp24 Take 1 capsule (0.4 mg total) by mouth once daily.    varenicline (CHANTIX) 1 mg Tab Take 1 tablet (1 mg total) by mouth 2 (two) times daily.           Clinical Reference Information           Your Vitals Were     BP Pulse Temp Resp Weight SpO2    121/75 79 97.7 °F (36.5 °C) 18 76.2 kg (168 lb) 98%    BMI                27.96 kg/m2          Allergies as of 5/17/2017        Reactions    Aspirin Shortness Of Breath    Toradol [Ketorolac] Shortness Of Breath    Toradol [Ketorolac] Anaphylaxis    Aspirin     Codeine     Penicillins     Shellfish Containing Products     Sulfa (Sulfonamide Antibiotics)       Immunizations Administered on Date of Encounter - 5/17/2017     None      ED Micro, Lab, POCT     Start Ordered       Status Ordering Provider    05/17/17 1727 05/17/17 1727  POCT Liver Panel  Once      Final result     05/17/17 1724 05/17/17 1724  Troponin ISTAT  Once      Final result     05/17/17 1714 05/17/17 1714  POCT CMP  Once      Final result     05/17/17 1651 05/17/17 1651  POCT  URINALYSIS W/O SCOPE  Once      Final result     05/17/17 1621 05/17/17 1632  POCT CBC  Once      Final result     05/17/17 1621 05/17/17 1632  POCT CMP  Once      Completed     05/17/17 1621 05/17/17 1632  POCT URINALYSIS W/O SCOPE  Once      Completed     05/17/17 1621 05/17/17 1632  POCT amylase  Once      Completed     05/17/17 1621 05/17/17 1632  POCT Troponin  Once      Completed       ED Imaging Orders     Start Ordered       Status Ordering Provider    05/17/17 1755 05/17/17 1754  X-Ray Abdomen Flat And Erect  1 time imaging      Final result         Discharge Instructions         Eating a High-Fiber Diet  Fiber is what gives strength and structure to plants. Most grains, beans, vegetables, and fruits contain fiber. Foods rich in fiber are often low in calories and fat, and they fill you up more. They may also reduce your risks for certain health problems. To find out the amount of fiber in canned, packaged, or frozen foods, read the Nutrition Facts label. It tells you how much fiber is in a serving.    Types of fiber and their benefits  There are two types of fiber: insoluble and soluble. They both aid digestion and help you maintain a healthy weight.  · Insoluble fiber. This is found in whole grains, cereals, certain fruits and vegetables such as apple skin, corn, and carrots. Insoluble fiber may prevent constipation and reduce the risk for certain types of cancer.  · Soluble fiber. This type of fiber is in oats, beans, and certain fruits and vegetables such as strawberries and peas. Soluble fiber can reduce cholesterol, which may help lower the risk for heart disease. It also helps control blood sugar levels.  Look for high-fiber foods  Try these foods to add fiber to your diet:  · Whole-grain breads and cereals. Try to eat 6 to 8 ounces a day. Include wheat and oat bran cereals, whole-wheat muffins or toast, and corn tortillas in your meals.  · Fruits. Try to eat 2 cups a day. Apples, oranges,  strawberries, pears, and bananas are good sources. (Note: Fruit juice is low in fiber.)  · Vegetables. Try to eat at least 2.5 cups a day. Add asparagus, carrots, broccoli, peas, and corn to your meals.  · Beans. One cup of cooked lentils gives you over 15 grams of fiber. Try navy beans, lentils, and chickpeas.  · Seeds. A small handful of seeds gives you about 3 grams of fiber. Try sunflower seeds.  Keep track of your fiber  Keep track of how much fiber you eat. Start by reading food labels. Then eat a variety of foods high in fiber. As you begin to eat more fiber, ask your healthcare provider how much water you should be drinking to keep your digestive system working smoothly.  You should aim for a certain amount of fiber in your diet each day. If you are a woman, that amount is between 25 and 28 grams per day. Men should aim for 30 to 33 grams per day. After age 50, your daily fiber needs drop to 22 grams for women and 28 grams for men.  Before you reach for the fiber supplements, think about this. Fiber is found naturally in healthy whole foods. It gives you that feeling of fullness after you eat. Taking fiber supplements or eating fiber-enriched foods will not give you this full feeling.  Your fiber intake is a good measure for the quality of your overall diet. If you are missing out on your daily amount of fiber, you may be lacking other important nutrients as well.  Date Last Reviewed: 5/11/2015  © 0888-1877 [employer]. [address] [place] [address]. All rights reserved. This information is not intended as a substitute for professional medical care. Always follow your healthcare professional's instructions.          Your Scheduled Appointments     May 22, 2017 10:40 AM CDT   New Patient with Vicente Hua MD   [facility]    [address] [place] [address] [id number] [phone]              [facility] Sign-Up     Activating your  [facility] account is as easy as 1-2-3!     1) Visit [email or web address], select Sign Up Now, enter this activation code and your date of birth, then select Next.  [email or web address]  Expires: 6/11/2017  8:09 AM      2) Create a username and password to use when you visit [facility] in the future and select a security question in case you lose your password and select Next.    3) Enter your e-mail address and click Sign Up!    Additional Information  If you have questions, please e-mail [email or web address] or call [phone] to talk to our [facility] staff. Remember, [facility] is NOT to be used for urgent needs. For medical emergencies, dial 911.         Smoking Cessation     If you would like to quit smoking:   You may be eligible for free services if you are a [place] resident and started smoking cigarettes before September 1, 1988.  Call the Smoking Cessation [employer] ([facility]) toll free at ([phone] 072-1730 or [phone] 421-7143.   Call 6-893-QUIT-NOW if you do not meet the above criteria.   Contact us via email: [email or web address]   View our website for more information: [email or web address]         [facility] complies with applicable Federal civil rights laws and does not discriminate on the basis of race, color, national origin, age, disability, or sex.        Language Assistance Services     ATTENTION: Language assistance services are available, free of charge. Please call [phone].      ATENCIÓN: Si habla español, tiene a alves disposición servicios gratuitos de asistencia lingüística. Llame al [phone].     CHÚ Ý: N?u b?n nói Ti?ng Vi?t, có các d?ch v? h? tr? ngôn ng? mi?n phí dành cho b?n. G?i s? [phone].

## 2017-05-17 NOTE — DISCHARGE INSTRUCTIONS
Eating a High-Fiber Diet  Fiber is what gives strength and structure to plants. Most grains, beans, vegetables, and fruits contain fiber. Foods rich in fiber are often low in calories and fat, and they fill you up more. They may also reduce your risks for certain health problems. To find out the amount of fiber in canned, packaged, or frozen foods, read the Nutrition Facts label. It tells you how much fiber is in a serving.    Types of fiber and their benefits  There are two types of fiber: insoluble and soluble. They both aid digestion and help you maintain a healthy weight.  · Insoluble fiber. This is found in whole grains, cereals, certain fruits and vegetables such as apple skin, corn, and carrots. Insoluble fiber may prevent constipation and reduce the risk for certain types of cancer.  · Soluble fiber. This type of fiber is in oats, beans, and certain fruits and vegetables such as strawberries and peas. Soluble fiber can reduce cholesterol, which may help lower the risk for heart disease. It also helps control blood sugar levels.  Look for high-fiber foods  Try these foods to add fiber to your diet:  · Whole-grain breads and cereals. Try to eat 6 to 8 ounces a day. Include wheat and oat bran cereals, whole-wheat muffins or toast, and corn tortillas in your meals.  · Fruits. Try to eat 2 cups a day. Apples, oranges, strawberries, pears, and bananas are good sources. (Note: Fruit juice is low in fiber.)  · Vegetables. Try to eat at least 2.5 cups a day. Add asparagus, carrots, broccoli, peas, and corn to your meals.  · Beans. One cup of cooked lentils gives you over 15 grams of fiber. Try navy beans, lentils, and chickpeas.  · Seeds. A small handful of seeds gives you about 3 grams of fiber. Try sunflower seeds.  Keep track of your fiber  Keep track of how much fiber you eat. Start by reading food labels. Then eat a variety of foods high in fiber. As you begin to eat more fiber, ask your healthcare provider  how much water you should be drinking to keep your digestive system working smoothly.  You should aim for a certain amount of fiber in your diet each day. If you are a woman, that amount is between 25 and 28 grams per day. Men should aim for 30 to 33 grams per day. After age 50, your daily fiber needs drop to 22 grams for women and 28 grams for men.  Before you reach for the fiber supplements, think about this. Fiber is found naturally in healthy whole foods. It gives you that feeling of fullness after you eat. Taking fiber supplements or eating fiber-enriched foods will not give you this full feeling.  Your fiber intake is a good measure for the quality of your overall diet. If you are missing out on your daily amount of fiber, you may be lacking other important nutrients as well.  Date Last Reviewed: 5/11/2015 © 2000-2016 [employer]. [address] [place] [address]. All rights reserved. This information is not intended as a substitute for professional medical care. Always follow your healthcare professional's instructions.

## 2017-05-17 NOTE — ED PROVIDER NOTES
Encounter Date: 5/17/2017       History     Chief Complaint   Patient presents with    Abdominal Pain     c/o abd. cramping started this a.m., states he is out of his pain meds     Review of patient's allergies indicates:   Allergen Reactions    Aspirin Shortness Of Breath    Toradol [ketorolac] Shortness Of Breath    Toradol [ketorolac] Anaphylaxis    Aspirin     Codeine     Penicillins     Shellfish containing products     Sulfa (sulfonamide antibiotics)      HPI Comments: Larry Lemon is a 51 y.o. male who presents to the Emergency Department with  vomiting and abdominal pain.  Patient states he has a cramping belly pain.  Patient states he woke up at 3:30 in the morning vomiting.   he's had the symptoms for the last few days was discharged from the hospital on Monday after being treated for abdominal pain nausea and vomiting.  He reports he Was discharged with Valium and Norco to treat his symptoms.  Patient states he did not give him enough Norco or Valium as he ran out of medicine yesterday. He does have a follow-up appointment on Monday with Dr. Hua.  Patient states he felt better until he ran out of pain medicines yesterday.  Patient states when he tries to eat anything he just vomits it right up.  Cramping abdominal pain comes and goes.      Patient is a 51 y.o. male presenting with the following complaint: vomiting. The history is provided by the patient and the spouse.   Emesis    This is a recurrent problem. The current episode started several days ago. The problem occurs 2 - 4 times per day. The problem has been gradually worsening. The emesis has an appearance of stomach contents. Associated symptoms include abdominal pain. Pertinent negatives include no chills, no cough, no diarrhea, no fever, no headaches, no sweats and no URI.     Past Medical History:   Diagnosis Date    Anxiety     Depression     Heart murmur     Hepatitis C     History of psychiatric hospitalization      5 previous hospitalizations.  Last was in 2015 at [facility]    HTN (hypertension) 11/6/2014    Hx of psychiatric care     Celexa, Zoloft, Seroquel     Psychiatric problem     depression and anxiety    Stroke     right side weakness    Therapy     reports last saw psychiatrist last year and he does not remember the name     Past Surgical History:   Procedure Laterality Date    APPENDECTOMY      FOOT SURGERY       Family History   Problem Relation Age of Onset    Drug abuse Mother     Alcohol abuse Father      Social History   Substance Use Topics    Smoking status: Current Every Day Smoker     Packs/day: 2.00     Years: 25.00     Types: Cigarettes    Smokeless tobacco: Not on file      Comment: patient ran out of [employer]    Alcohol use Yes      Comment: Occasionally, last use was 3 days ago     Review of Systems   Constitutional: Negative for chills and fever.   HENT: Negative for sore throat.    Respiratory: Negative for cough.    Gastrointestinal: Positive for abdominal pain, nausea and vomiting. Negative for diarrhea.   Neurological: Negative for syncope and headaches.   All other systems reviewed and are negative.      Physical Exam   Initial Vitals   BP Pulse Resp Temp SpO2   05/17/17 1554 05/17/17 1554 05/17/17 1554 05/17/17 1554 05/17/17 1554   101/65 100 18 98.7 °F (37.1 °C) 98 %     Physical Exam    Nursing note and vitals reviewed.  Constitutional: He appears well-developed and well-nourished. He is not diaphoretic. No distress.   HENT:   Head: Normocephalic and atraumatic.   Right Ear: External ear normal.   Left Ear: External ear normal.   Mouth/Throat: Oropharynx is clear and moist.   Eyes: EOM are normal. Pupils are equal, round, and reactive to light.   Neck: Normal range of motion. Neck supple.   Cardiovascular: Normal rate, regular rhythm, normal heart sounds and intact distal pulses. Exam reveals no gallop and no friction rub.    No murmur heard.  Pulmonary/Chest: Breath sounds normal. No  respiratory distress. He has no wheezes. He has no rhonchi. He has no rales.   Abdominal: Soft. Bowel sounds are normal. He exhibits no distension. There is no tenderness. There is no rebound and no guarding.   Musculoskeletal: Normal range of motion.   Neurological: He is alert and oriented to person, place, and time. He has normal strength and normal reflexes. He displays normal reflexes. No cranial nerve deficit or sensory deficit.   Skin: Skin is warm and dry.   Psychiatric: He has a normal mood and affect.         ED Course   Procedures  Labs Reviewed   POCT URINALYSIS W/O SCOPE - Abnormal; Notable for the following:        Result Value    Glucose, UA Negative (*)     Bilirubin, UA Negative (*)     Ketones, UA Negative (*)     Spec Grav UA >=1.030 (*)     Protein, UA Negative (*)     Nitrite, UA Negative (*)     Leukocytes, UA Negative (*)     All other components within normal limits   POCT CMP - Abnormal; Notable for the following:     Albumin, POC 3.1 (*)     All other components within normal limits   POCT LIVER PANEL - Abnormal; Notable for the following:     Albumin, POC 3.2 (*)     All other components within normal limits   TROPONIN ISTAT   POCT CBC   POCT URINALYSIS W/O SCOPE   POCT CMP   POCT AMYLASE   POCT TROPONIN    amylase is normal at 60.  The sodium 139, potassium 4.3, glucose 104, be UN 15, creatinine 1.0.  These are within normal limits.  Upon is negative at 0.00.  CBC White blood cell count 9.4, hemoglobin 13, hematocrit 40, platelets 383.  UA negative leukocytes and nitrates.    EKG Readings: (Independently Interpreted)   Initial Reading: No STEMI. Rhythm: Normal Sinus Rhythm. Heart Rate: 76. Axis: Normal. Clinical Impression: Normal Sinus Rhythm Other Impression: Normal EKG no ST elevation     Imaging Results         X-Ray Abdomen Flat And Erect (Final result) Result time:  05/17/17 18:16:28    Final result by Interface, Rad Results In (05/17/17 18:16:28)    Narrative:    AP supine and  upright views of the abdomen  History: Abdominal pain.  Comparison: None.     A large amount of retained fecal material is present in the colon.  There is no evidence   for abdominal mass, organomegaly or abnormal calcification.  No free air is seen within   the abdomen.     No acute osseous abnormality is seen. Regional soft tissues are grossly unremarkable.     Lung bases are clear.  Impression:     There is a large amount of retained fecal material within the colon, suggesting   constipation.  Please correlate clinically.     No acute intra-abdominal pathology is seen.     SL: 24 Signed by: ELDER Hampton M.D.  2017-05-17 18:16:30 [CDT]                                   ED Course       MDM  CC vomiting that woke him at 3:30 in the morning with abdominal cramping.  Patient repeatedly requesting oral pain medicine.  No vomiting appreciated and turned ED visit.  Treatment in the ED physical exam, Bentyl for cramping abdominal pain, Phenergan for nausea, 1 L normal saline.  Patient repeatedly asking for Norco and Valium.  No vomiting appreciated during ED stay.  No ketones on urine no elevation of BUN/creatinine creatinine.  Patient states Bentyl helped a little with pain but he does need to Norco and Valium.  Obtaining prescription monitoring report which shows patient has had multiple narcotic prescriptions written by multiple physicians over the last few months.  It does not show the prescriptions for Valium and Norco that were filled on Monday.  We'll treat pain with Bentyl, Tylenol, and Phenergan.  Patient very dissatisfied as he is not getting any Norco and Valium prescription to go home with.   Discussed with patient x-ray results which shows constipation.  Discussed with patient high-fiber diet, and the fact that opiates worsen constipation which will also worsen the abdominal pain.    Discussed labs, and imaging results.    Fill and take prescriptions as directed.  Answered questions and discussed  discharge plan.    Follow up with PCP in 1 days.  Patient has follow-up appointment scheduled on Monday with Dr. Hua.    Clinical Impression:   The primary encounter diagnosis was Abdominal pain. Diagnoses of Drug-seeking behavior and Constipation, unspecified constipation type were also pertinent to this visit.          Tanesha Vang DO  05/17/17 1825

## 2017-05-22 ENCOUNTER — OFFICE VISIT (OUTPATIENT)
Dept: INTERNAL MEDICINE | Facility: CLINIC | Age: 52
End: 2017-05-22
Attending: INTERNAL MEDICINE
Payer: MEDICAID

## 2017-05-22 VITALS
DIASTOLIC BLOOD PRESSURE: 82 MMHG | HEART RATE: 80 BPM | TEMPERATURE: 98 F | SYSTOLIC BLOOD PRESSURE: 126 MMHG | OXYGEN SATURATION: 98 % | HEIGHT: 66 IN | BODY MASS INDEX: 26.49 KG/M2 | WEIGHT: 164.81 LBS

## 2017-05-22 DIAGNOSIS — R10.13 EPIGASTRIC PAIN: ICD-10-CM

## 2017-05-22 DIAGNOSIS — K21.00 GASTROESOPHAGEAL REFLUX DISEASE WITH ESOPHAGITIS: Primary | ICD-10-CM

## 2017-05-22 DIAGNOSIS — B18.2 CHRONIC HEPATITIS C WITHOUT HEPATIC COMA: ICD-10-CM

## 2017-05-22 DIAGNOSIS — R26.81 GAIT INSTABILITY: ICD-10-CM

## 2017-05-22 DIAGNOSIS — F19.94 SUBSTANCE INDUCED MOOD DISORDER: ICD-10-CM

## 2017-05-22 DIAGNOSIS — Z72.0 TOBACCO ABUSE DISORDER: ICD-10-CM

## 2017-05-22 DIAGNOSIS — I69.359 HEMIPARESIS AFFECTING DOMINANT SIDE AS LATE EFFECT OF CEREBROVASCULAR ACCIDENT: ICD-10-CM

## 2017-05-22 DIAGNOSIS — F32.A DEPRESSION, UNSPECIFIED DEPRESSION TYPE: ICD-10-CM

## 2017-05-22 DIAGNOSIS — F14.20 COCAINE USE DISORDER, SEVERE, DEPENDENCE: ICD-10-CM

## 2017-05-22 DIAGNOSIS — Z86.73 HISTORY OF CEREBRAL INFARCTION: Chronic | ICD-10-CM

## 2017-05-22 DIAGNOSIS — F41.9 ANXIETY: ICD-10-CM

## 2017-05-22 DIAGNOSIS — I10 ESSENTIAL HYPERTENSION: ICD-10-CM

## 2017-05-22 DIAGNOSIS — E78.5 HYPERLIPIDEMIA, UNSPECIFIED HYPERLIPIDEMIA TYPE: ICD-10-CM

## 2017-05-22 PROCEDURE — 99496 TRANSJ CARE MGMT HIGH F2F 7D: CPT | Mod: PBBFAC,PO | Performed by: INTERNAL MEDICINE

## 2017-05-22 PROCEDURE — 99999 PR PBB SHADOW E&M-EST. PATIENT-LVL V: CPT | Mod: PBBFAC,,, | Performed by: INTERNAL MEDICINE

## 2017-05-22 PROCEDURE — 99215 OFFICE O/P EST HI 40 MIN: CPT | Mod: PBBFAC,PO | Performed by: INTERNAL MEDICINE

## 2017-05-22 RX ORDER — CLOPIDOGREL BISULFATE 75 MG/1
75 TABLET ORAL DAILY
Qty: 90 TABLET | Refills: 11 | Status: SHIPPED | OUTPATIENT
Start: 2017-05-22

## 2017-05-22 RX ORDER — MIRTAZAPINE 30 MG/1
30 TABLET, ORALLY DISINTEGRATING ORAL NIGHTLY
Qty: 30 TABLET | Refills: 1 | Status: SHIPPED | OUTPATIENT
Start: 2017-05-22 | End: 2017-05-25

## 2017-05-22 RX ORDER — LISINOPRIL 40 MG/1
40 TABLET ORAL DAILY
Qty: 90 TABLET | Refills: 11
Start: 2017-05-22

## 2017-05-22 RX ORDER — OMEPRAZOLE 40 MG/1
40 CAPSULE, DELAYED RELEASE ORAL EVERY MORNING
Qty: 90 CAPSULE | Refills: 2 | Status: SHIPPED | OUTPATIENT
Start: 2017-05-22

## 2017-05-22 RX ORDER — BUPROPION HYDROCHLORIDE 150 MG/1
150 TABLET ORAL DAILY
Qty: 30 TABLET | Refills: 1 | Status: ON HOLD | OUTPATIENT
Start: 2017-05-22 | End: 2018-04-10 | Stop reason: HOSPADM

## 2017-05-22 RX ORDER — SIMVASTATIN 40 MG/1
40 TABLET, FILM COATED ORAL NIGHTLY
Qty: 90 TABLET | Refills: 11 | Status: SHIPPED | OUTPATIENT
Start: 2017-05-22 | End: 2018-07-27

## 2017-05-22 NOTE — PATIENT INSTRUCTIONS
[facility]  [address] [place] [address]  [address]  [place]  [address]   [phone]

## 2017-05-22 NOTE — MEDICAL/APP STUDENT
"Subjective:       Patient ID: Larry Lemon is a 51 y.o. male.    Chief Complaint: Establish Care (Hospital f/u)    HPI   Mr. Lemon is a 51 year old male who presented to the [facility] clinic on 5/22/17.  He is here to establish primary care.  In terms of most recent healthcare, he was recently admitted for an esophageal bleed after swallowing a broken tooth during his sleep.  He was also recently admitted for suicidal ideation, after which he was started on Wellbutrin.  He has been compliant with the medication and feels that it has helped "a little".  He reports sexual dysfunction and loss of libido, which he would like to seek help for.  He is also interested in refilling numerous medications.    Review of Systems   Constitutional: Positive for unexpected weight change. Negative for chills, fatigue and fever.   HENT: Positive for rhinorrhea and sore throat.    Respiratory: Negative for cough, chest tightness and shortness of breath.    Cardiovascular: Negative for chest pain, palpitations and leg swelling.   Gastrointestinal: Positive for abdominal pain, constipation, nausea and vomiting. Negative for blood in stool and diarrhea.   Genitourinary: Negative for dysuria and hematuria.   Musculoskeletal: Negative for arthralgias, back pain and myalgias.   Skin: Negative for color change, pallor and rash.   Neurological: Positive for headaches. Negative for dizziness, seizures and numbness.   Psychiatric/Behavioral: Positive for dysphoric mood.       Past Medical History:   Diagnosis Date    Anxiety     Depression     Heart murmur     Hepatitis C     History of psychiatric hospitalization     5 previous hospitalizations.  Last was in 2015 at [facility]    HTN (hypertension) 11/6/2014    Hx of psychiatric care     Celexa, Zoloft, Seroquel     Psychiatric problem     depression and anxiety    Stroke     right side weakness    Therapy     reports last saw psychiatrist last year and he does " not remember the name       Objective:      Physical Exam   Constitutional: He is oriented to person, place, and time.   HENT:   Head: Normocephalic and atraumatic.   Eyes: Conjunctivae and EOM are normal. Pupils are equal, round, and reactive to light.   Neck: Normal range of motion. Neck supple. No thyromegaly present.   Cardiovascular: Normal rate, regular rhythm and normal heart sounds.    No murmur heard.  Pulmonary/Chest: Effort normal and breath sounds normal. No respiratory distress. He has no wheezes. He has no rales.   Abdominal: Soft. Bowel sounds are normal. He exhibits no distension and no mass. There is tenderness (Epigastric & RUQ). There is no rebound.   Musculoskeletal: Normal range of motion. He exhibits no edema.   Neurological: He is alert and oriented to person, place, and time.   Skin: Skin is warm and dry. No erythema.       Assessment:       1. Gastroesophageal reflux disease with esophagitis    2. Chronic hepatitis C without hepatic coma    3. History of cerebral infarction    4. Essential hypertension    5. Hyperlipidemia, unspecified hyperlipidemia type    6. Hemiparesis affecting dominant side as late effect of cerebrovascular accident    7. Gait instability    8. Substance induced mood disorder    9. Cocaine use disorder, severe, dependence    10. Tobacco abuse disorder    11. Epigastric pain    12. Anxiety    13. Depression, unspecified depression type        Plan:       Epigastric pain  -Started on omeprazole 40mg 1x/day 30 minutes before meals  -Repeat endoscopy due to esophageal abnormality    Hepatitis C  -Ordered Hep C RNA PCR  -Ordered Hep B surface antibody/antigen    Hypertension  -Continue lisinopril 40mg daily    Hyperlipidemia  -Continue simvastatin 40mg daily       Anxiety/Depression  -Continue wellbutrin 150mg daily  -Continue mirtazapine 30mg daily    Smoking cessation  -Smokes 1pk/day  -Interested in quitting  -Provide smoking cessation resources

## 2017-05-22 NOTE — PROGRESS NOTES
Subjective:       Patient ID: Larry Lemon is a 51 y.o. male.    Chief Complaint: Establish Care (Hospital f/u)    Here to [facility] care.    Recent episode of tooth swallowing and ? Esophageal inj from that. Was at [facility] a few mos ago and told he would need f/u. He has chronic upper abd pains since this episode of what sounds like esophagitis.    Also 12/16 had SI and inpt psych admission.    Also reports sexaul dysfunction, both decreased libido and ED.    Hx of stroke 11/2014 with R sided hemiparesis, has slowly improved, but still chronic weakness R side of body and uses a cane.      Review of Systems   Constitutional: Positive for unexpected weight change. Negative for activity change and fatigue.   HENT: Negative for congestion.    Respiratory: Negative for cough, chest tightness, shortness of breath and wheezing.    Cardiovascular: Negative for chest pain, palpitations and leg swelling.   Gastrointestinal: Positive for abdominal pain (upper abd). Negative for abdominal distention, blood in stool, constipation, nausea and vomiting.   Genitourinary: Negative for decreased urine volume and difficulty urinating.   Musculoskeletal: Negative for arthralgias and joint swelling.   Skin: Negative for rash and wound.   Neurological: Positive for weakness (R side body).   Psychiatric/Behavioral: Negative for agitation and confusion. The patient is nervous/anxious.        Objective:      Physical Exam   Constitutional: He is oriented to person, place, and time. He appears well-developed and well-nourished. No distress.   HENT:   Head: Normocephalic and atraumatic.   Eyes: No scleral icterus.   Neck: Normal range of motion. No thyromegaly present.   Cardiovascular: Normal rate, regular rhythm and normal heart sounds.  Exam reveals no gallop and no friction rub.    No murmur heard.  Pulmonary/Chest: Effort normal and breath sounds normal. No respiratory distress. He has no wheezes. He has no rales.   Abdominal:  Soft. Bowel sounds are normal. He exhibits no distension and no mass. There is tenderness. There is no rebound and no guarding.   Subxiphoid tenderness to deep palpation     Musculoskeletal: Normal range of motion. He exhibits no edema.   Lymphadenopathy:     He has no cervical adenopathy.   Neurological: He is alert and oriented to person, place, and time.   Mild R sided hemiparesis and UMN signs   Skin: No lesion noted. He is not diaphoretic.   Psychiatric: He has a normal mood and affect. Thought content normal.   calm       Assessment:       1. Gastroesophageal reflux disease with esophagitis    2. Chronic hepatitis C without hepatic coma    3. History of cerebral infarction    4. Essential hypertension    5. Hyperlipidemia, unspecified hyperlipidemia type    6. Hemiparesis affecting dominant side as late effect of cerebrovascular accident    7. Gait instability    8. Substance induced mood disorder    9. Cocaine use disorder, severe, dependence    10. Tobacco abuse disorder    11. Epigastric pain    12. Anxiety    13. Depression, unspecified depression type        Plan:       Larry Ritchie was seen today for establish care.    Diagnoses and all orders for this visit:    Transitional Care Note    Family and/or Caretaker present at visit?  No.  Diagnostic tests reviewed/disposition: No diagnosic tests pending after this hospitalization.  Disease/illness education: provided  Home health/community services discussion/referrals: Patient does not have home health established from hospital visit.  They do not need home health.  If needed, we will set up home health for the patient.   Establishment or re-establishment of referral orders for community resources: need mental health care.   Discussion with other health care providers: No discussion with other health care providers necessary.             Gastroesophageal reflux disease with esophagitis  -     Ambulatory Referral to Gastroenterology  -     omeprazole  (PRILOSEC) 40 MG capsule; Take 1 capsule (40 mg total) by mouth every morning. Take 30 minutes before eating on an empty stomach  Had EGD at [facility], CT abd showed esophageal abnormality, likely needs another egd    Chronic hepatitis C without hepatic coma  -     Hepatitis C antibody; Future  -     Hepatitis B surface antibody; Future  -     Hepatitis B surface antigen; Future  -     Hepatitis C RNA, quantitative, PCR; Future    History of cerebral infarction    Essential hypertension  -     lisinopril (PRINIVIL,ZESTRIL) 40 MG tablet; Take 1 tablet (40 mg total) by mouth once daily.  controlled    Hyperlipidemia, unspecified hyperlipidemia type  -     simvastatin (ZOCOR) 40 MG tablet; Take 1 tablet (40 mg total) by mouth every evening.    Hemiparesis affecting dominant side as late effect of cerebrovascular accident  -     clopidogrel (PLAVIX) 75 mg tablet; Take 1 tablet (75 mg total) by mouth once daily.  -     Lipid panel; Future    Gait instability    Substance induced mood disorder  -     Ambulatory Referral to Psychiatry  Cocaine use disorder, severe, dependence  -     Ambulatory Referral to Psychiatry    Tobacco abuse disorder  -     Ambulatory referral to Smoking Cessation Program    Epigastric pain  -     Ambulatory Referral to Gastroenterology    Anxiety  -     buPROPion (WELLBUTRIN XL) 150 MG TB24 tablet; Take 1 tablet (150 mg total) by mouth once daily.  -     mirtazapine (REMERON SOL-TAB) 30 MG disintegrating tablet; Take 1 tablet (30 mg total) by mouth nightly.    Depression, unspecified depression type  -     buPROPion (WELLBUTRIN XL) 150 MG TB24 tablet; Take 1 tablet (150 mg total) by mouth once daily.  -     mirtazapine (REMERON SOL-TAB) 30 MG disintegrating tablet; Take 1 tablet (30 mg total) by mouth nightly.    ED/sexual issues -- see if symptoms improve while away from cocaine. Smoking certainly complicates ED    Health Maintenance       Date Due Completion Date    Hepatitis C Screening  1965 ---    Lipid Panel 1965 ---    Pneumococcal PPSV23 (Medium Risk) (1) 11/26/1983 ---    Colonoscopy 11/26/2015 ---    Influenza Vaccine 08/01/2017 ---    TETANUS VACCINE 04/28/2026 4/28/2016            Return in about 3 months (around 8/22/2017).    Over 25 minutes spent with patient and majority in counseling and patient education.

## 2017-05-22 NOTE — LETTER
[unfilled]       May 22, 2017    Larry Chau Ion  [address] [address]      Dear Mr. Lemon:    You have an appointment scheduled at [facility] located at [address] [place] [address] phone number [phone]. Your appointment is scheduled on July 11, 2017 at 9 am. Please arrive 15 minutes early.      Sincerely,    Ida GANDHI M.A.

## 2017-05-23 ENCOUNTER — TELEPHONE (OUTPATIENT)
Dept: INTERNAL MEDICINE | Facility: CLINIC | Age: 52
End: 2017-05-23

## 2017-05-23 NOTE — TELEPHONE ENCOUNTER
----- Message from Cayetano Boland sent at 5/22/2017 12:20 PM CDT -----  Contact: Tyrell ([facility])   Tyrell ([facility]) in regards to mirtazapine (REMERON SOL-TAB) 30 MG disintegrating tablet not covered by insurance. Their callback number is [phone].

## 2017-05-24 ENCOUNTER — LAB VISIT (OUTPATIENT)
Dept: LAB | Facility: HOSPITAL | Age: 52
End: 2017-05-24
Attending: INTERNAL MEDICINE
Payer: MEDICAID

## 2017-05-24 DIAGNOSIS — B18.2 CHRONIC HEPATITIS C WITHOUT HEPATIC COMA: ICD-10-CM

## 2017-05-24 DIAGNOSIS — I69.359 HEMIPARESIS AFFECTING DOMINANT SIDE AS LATE EFFECT OF CEREBROVASCULAR ACCIDENT: ICD-10-CM

## 2017-05-24 LAB
CHOLEST/HDLC SERPL: 3.8 {RATIO}
HDL/CHOLESTEROL RATIO: 26.2 %
HDLC SERPL-MCNC: 187 MG/DL
HDLC SERPL-MCNC: 49 MG/DL
LDLC SERPL CALC-MCNC: 114.2 MG/DL
NONHDLC SERPL-MCNC: 138 MG/DL
TRIGL SERPL-MCNC: 119 MG/DL

## 2017-05-24 PROCEDURE — 87522 HEPATITIS C REVRS TRNSCRPJ: CPT

## 2017-05-24 PROCEDURE — 86706 HEP B SURFACE ANTIBODY: CPT

## 2017-05-24 PROCEDURE — 86803 HEPATITIS C AB TEST: CPT

## 2017-05-24 PROCEDURE — 87340 HEPATITIS B SURFACE AG IA: CPT

## 2017-05-24 PROCEDURE — 36415 COLL VENOUS BLD VENIPUNCTURE: CPT | Mod: PO

## 2017-05-24 PROCEDURE — 80061 LIPID PANEL: CPT

## 2017-05-25 ENCOUNTER — TELEPHONE (OUTPATIENT)
Dept: INTERNAL MEDICINE | Facility: CLINIC | Age: 52
End: 2017-05-25

## 2017-05-25 LAB
HBV SURFACE AB SER-ACNC: POSITIVE M[IU]/ML
HBV SURFACE AG SERPL QL IA: NEGATIVE
HCV AB SERPL QL IA: POSITIVE

## 2017-05-25 RX ORDER — MIRTAZAPINE 15 MG/1
15 TABLET, FILM COATED ORAL NIGHTLY
Qty: 30 TABLET | Refills: 2 | Status: ON HOLD | OUTPATIENT
Start: 2017-05-25 | End: 2018-04-10 | Stop reason: HOSPADM

## 2017-06-01 ENCOUNTER — TELEPHONE (OUTPATIENT)
Dept: INTERNAL MEDICINE | Facility: CLINIC | Age: 52
End: 2017-06-01

## 2017-06-01 DIAGNOSIS — B18.2 CHRONIC HEPATITIS C WITHOUT HEPATIC COMA: Primary | ICD-10-CM

## 2017-06-01 NOTE — TELEPHONE ENCOUNTER
Hi, please call him --  His blood work from last week shows that he does have hepatitis C, I recommend that he see a specialist in the [facility], order is in.  Let me know if patient has any questions.  Thank you, Vicente Hua

## 2017-06-02 NOTE — TELEPHONE ENCOUNTER
Hepatology Dept schedule their own appts but I was able to give patients information. The department will contact the patient to schedule.

## 2017-06-05 ENCOUNTER — TELEPHONE (OUTPATIENT)
Dept: HEPATOLOGY | Facility: CLINIC | Age: 52
End: 2017-06-05

## 2017-06-05 NOTE — TELEPHONE ENCOUNTER
Chart reviewed. Pt recently admitted for abdominal pain and diverticulosis. HCV RNA noted along with recent labs and imaging. Pt had positive drug screen on 5/12/17.    Attempted to speak with pt. Left message requesting pt call back to schedule consult. Given direct number.

## 2017-06-05 NOTE — TELEPHONE ENCOUNTER
----- Message from Aylin Miller RN sent at 6/5/2017  8:45 AM CDT -----  Contact: Primary care internal medicine       ----- Message -----  From: Aylin Levi  Sent: 6/2/2017   3:07 PM  To: [facility] Hepatitis C Referral Pool    Calling to schedule the patient appt for hep c treatment. Please call

## 2017-06-06 ENCOUNTER — TELEPHONE (OUTPATIENT)
Dept: HEPATOLOGY | Facility: CLINIC | Age: 52
End: 2017-06-06

## 2017-06-15 PROCEDURE — 99496 TRANSJ CARE MGMT HIGH F2F 7D: CPT | Mod: S$PBB,,, | Performed by: INTERNAL MEDICINE

## 2017-06-16 ENCOUNTER — HOSPITAL ENCOUNTER (EMERGENCY)
Facility: OTHER | Age: 52
Discharge: HOME | End: 2017-06-16
Attending: EMERGENCY MEDICINE
Payer: MEDICAID

## 2017-06-16 VITALS
TEMPERATURE: 99 F | DIASTOLIC BLOOD PRESSURE: 73 MMHG | SYSTOLIC BLOOD PRESSURE: 136 MMHG | OXYGEN SATURATION: 100 % | RESPIRATION RATE: 18 BRPM | HEART RATE: 69 BPM | WEIGHT: 168 LBS | BODY MASS INDEX: 27.12 KG/M2

## 2017-06-16 DIAGNOSIS — M54.9 CHRONIC BILATERAL BACK PAIN, UNSPECIFIED BACK LOCATION: Primary | ICD-10-CM

## 2017-06-16 DIAGNOSIS — R52 PAIN: ICD-10-CM

## 2017-06-16 DIAGNOSIS — G89.29 CHRONIC BILATERAL BACK PAIN, UNSPECIFIED BACK LOCATION: Primary | ICD-10-CM

## 2017-06-16 LAB
BILIRUBIN, POC UA: NEGATIVE
BLOOD, POC UA: ABNORMAL
CLARITY, POC UA: CLEAR
COLOR, POC UA: ABNORMAL
GLUCOSE, POC UA: NEGATIVE
KETONES, POC UA: NEGATIVE
LEUKOCYTE EST, POC UA: ABNORMAL
NITRITE, POC UA: NEGATIVE
PH UR STRIP: 7 [PH]
PROTEIN, POC UA: NEGATIVE
SPECIFIC GRAVITY, POC UA: 1.01
UROBILINOGEN, POC UA: 0.2 E.U./DL

## 2017-06-16 PROCEDURE — 81001 URINALYSIS AUTO W/SCOPE: CPT

## 2017-06-16 PROCEDURE — 81003 URINALYSIS AUTO W/O SCOPE: CPT

## 2017-06-16 PROCEDURE — 99284 EMERGENCY DEPT VISIT MOD MDM: CPT | Mod: 25

## 2017-06-16 RX ORDER — METHOCARBAMOL 500 MG/1
1000 TABLET, FILM COATED ORAL 3 TIMES DAILY
Qty: 15 TABLET | Refills: 0 | Status: SHIPPED | OUTPATIENT
Start: 2017-06-16 | End: 2017-06-21
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
## 2017-06-27 ENCOUNTER — HOSPITAL ENCOUNTER (EMERGENCY)
Facility: OTHER | Age: 52
Discharge: HOME | End: 2017-06-27
Attending: EMERGENCY MEDICINE
Payer: MEDICAID

## 2017-06-27 VITALS
HEIGHT: 63 IN | TEMPERATURE: 98 F | BODY MASS INDEX: 29.77 KG/M2 | WEIGHT: 168 LBS | RESPIRATION RATE: 19 BRPM | SYSTOLIC BLOOD PRESSURE: 135 MMHG | OXYGEN SATURATION: 99 % | HEART RATE: 81 BPM | DIASTOLIC BLOOD PRESSURE: 68 MMHG

## 2017-06-27 DIAGNOSIS — K04.7 TOOTH ABSCESS: Primary | ICD-10-CM

## 2017-06-27 PROCEDURE — 99283 EMERGENCY DEPT VISIT LOW MDM: CPT | Mod: 25

## 2017-06-27 PROCEDURE — 96372 THER/PROPH/DIAG INJ SC/IM: CPT

## 2017-06-27 PROCEDURE — 25000003 PHARM REV CODE 250: Performed by: EMERGENCY MEDICINE

## 2017-06-27 RX ORDER — CLINDAMYCIN HYDROCHLORIDE 300 MG/1
300 CAPSULE ORAL 3 TIMES DAILY
Qty: 30 CAPSULE | Refills: 0 | Status: SHIPPED | OUTPATIENT
Start: 2017-06-27 | End: 2017-09-26 | Stop reason: SDUPTHER

## 2017-06-27 RX ORDER — CLINDAMYCIN PHOSPHATE 150 MG/ML
600 INJECTION, SOLUTION INTRAVENOUS
Status: COMPLETED | OUTPATIENT
Start: 2017-06-27 | End: 2017-06-27

## 2017-06-27 RX ORDER — HYDROCODONE BITARTRATE AND ACETAMINOPHEN 5; 325 MG/1; MG/1
1 TABLET ORAL EVERY 4 HOURS PRN
Qty: 10 TABLET | Refills: 0 | Status: SHIPPED | OUTPATIENT
Start: 2017-06-27 | End: 2017-06-29 | Stop reason: ALTCHOICE

## 2017-06-27 RX ADMIN — CLINDAMYCIN PHOSPHATE 600 MG: 150 INJECTION, SOLUTION INTRAMUSCULAR; INTRAVENOUS at 01:06

## 2017-06-27 NOTE — ED PROVIDER NOTES
Encounter Date: 6/27/2017       History     Chief Complaint   Patient presents with    Dental Pain     left lower tooth abscess, onset yesterday      Chief complaint: Dental pain  51-year-old who says that he has a cracked tooth to his left lower jaw and began having pain and swelling to the area 2 days ago.  He's been taking anti-inflammatories without improvement.  The pain worsens whenever he tries to eat anything.  He has an appointment with a dentist in a few days.  Patient denies fever.  He rates his pain as 10 out of 10.          Review of patient's allergies indicates:   Allergen Reactions    Aspirin Shortness Of Breath    Toradol [ketorolac] Shortness Of Breath    Toradol [ketorolac] Anaphylaxis    Aspirin     Codeine     Penicillins     Shellfish containing products     Sulfa (sulfonamide antibiotics)      Past Medical History:   Diagnosis Date    Anxiety     Depression     Heart murmur     Hepatitis C     History of psychiatric hospitalization     5 previous hospitalizations.  Last was in 2015 at [facility]    HTN (hypertension) 11/6/2014    Hx of psychiatric care     Celexa, Zoloft, Seroquel     Psychiatric problem     depression and anxiety    Stroke     right side weakness    Therapy     reports last saw psychiatrist last year and he does not remember the name     Past Surgical History:   Procedure Laterality Date    APPENDECTOMY      FOOT SURGERY       Family History   Problem Relation Age of Onset    Drug abuse Mother     Heart disease Mother     Alcohol abuse Father     Heart disease Father      Social History   Substance Use Topics    Smoking status: Current Every Day Smoker     Packs/day: 1.00     Years: 43.00     Types: Cigarettes    Smokeless tobacco: Not on file      Comment: patient ran out of Chantix    Alcohol use Yes      Comment: Occasionally, last use was 3 days ago     Review of Systems   Constitutional: Negative for appetite change.   HENT: Positive for dental  problem.    Gastrointestinal: Negative for nausea and vomiting.   Skin: Negative for color change.   Neurological: Positive for headaches.   Psychiatric/Behavioral: Positive for agitation.       Physical Exam     Initial Vitals [06/27/17 1306]   BP Pulse Resp Temp SpO2   137/73 87 17 98.2 °F (36.8 °C) 97 %      MAP       94.33         Physical Exam    Nursing note and vitals reviewed.  Constitutional: He appears distressed (appears uncomfortable).   HENT:   Head: Normocephalic.   Mouth/Throat:       No facial swelling or trismus   Eyes: Conjunctivae are normal.   Pulmonary/Chest: No respiratory distress.   Lymphadenopathy:        Head (right side): Submandibular adenopathy present.        Head (left side): Submandibular adenopathy present.   Neurological: He is alert and oriented to person, place, and time.   Skin: Skin is warm and dry.         ED Course   Procedures  Labs Reviewed - No data to display          Medical Decision Making:   Initial Assessment:   51-year-old who complains of dental abscess.  On exam patient is afebrile.  He does have bad dental caries.  He has one tooth that has fluctuance and tenderness surrounding it to the left lower jaw area  ED Management:  Patient was given clindamycin IM.  He will be discharged with clindamycin and 10 hydrocodone.  He has an appointment with his dentist in 3 days.                   ED Course     Clinical Impression:   The encounter diagnosis was Tooth abscess.                           Sheryl Zhao MD  06/27/17 0681

## 2017-06-29 ENCOUNTER — HOSPITAL ENCOUNTER (EMERGENCY)
Facility: OTHER | Age: 52
Discharge: HOME | End: 2017-06-29
Attending: EMERGENCY MEDICINE
Payer: MEDICAID

## 2017-06-29 ENCOUNTER — TELEPHONE (OUTPATIENT)
Dept: INTERNAL MEDICINE | Facility: CLINIC | Age: 52
End: 2017-06-29

## 2017-06-29 VITALS
OXYGEN SATURATION: 100 % | TEMPERATURE: 99 F | BODY MASS INDEX: 29.76 KG/M2 | HEART RATE: 85 BPM | RESPIRATION RATE: 18 BRPM | DIASTOLIC BLOOD PRESSURE: 75 MMHG | SYSTOLIC BLOOD PRESSURE: 110 MMHG | WEIGHT: 168 LBS

## 2017-06-29 DIAGNOSIS — K04.7 DENTAL ABSCESS: Primary | ICD-10-CM

## 2017-06-29 PROCEDURE — 99283 EMERGENCY DEPT VISIT LOW MDM: CPT

## 2017-06-29 PROCEDURE — 25000003 PHARM REV CODE 250: Performed by: EMERGENCY MEDICINE

## 2017-06-29 RX ORDER — HYDROCODONE BITARTRATE AND ACETAMINOPHEN 10; 325 MG/1; MG/1
1 TABLET ORAL
Status: COMPLETED | OUTPATIENT
Start: 2017-06-29 | End: 2017-06-29

## 2017-06-29 RX ORDER — HYDROCODONE BITARTRATE AND ACETAMINOPHEN 7.5; 325 MG/1; MG/1
1 TABLET ORAL EVERY 6 HOURS PRN
Qty: 15 TABLET | Refills: 0 | Status: ON HOLD | OUTPATIENT
Start: 2017-06-29 | End: 2018-04-10 | Stop reason: HOSPADM

## 2017-06-29 RX ADMIN — HYDROCODONE BITARTRATE AND ACETAMINOPHEN 1 TABLET: 10; 325 TABLET ORAL at 04:06

## 2017-06-29 NOTE — ED PROVIDER NOTES
Encounter Date: 6/29/2017       History     Chief Complaint   Patient presents with    Dental Pain     left side, states he was seen by a dentist today     51-year-old male ports dental pain for several days.  He reports he was seen here on June 27, 2017, and prescribed clarithromycin 300 mg 3 times daily as well as Norco 5/325 mg dispensed #10 for several dental abscesses to the left mandible.  He reports compliance with both medications.  He was seen by a dentist today, who told him that due to his history of cerebrovascular accident and being on Plavix, that that dentist would not be able to remove his abscess teeth and referred him to Dr. Larry Mosquera, an oral surgeon, instead.  Patient denies fever but reports pain due to having used alternate the Norco he was prescribed several days ago.      The history is provided by the patient.     Review of patient's allergies indicates:   Allergen Reactions    Aspirin Shortness Of Breath    Toradol [ketorolac] Shortness Of Breath    Toradol [ketorolac] Anaphylaxis    Aspirin     Codeine     Penicillins     Shellfish containing products     Sulfa (sulfonamide antibiotics)      Past Medical History:   Diagnosis Date    Anxiety     Depression     Heart murmur     Hepatitis C     History of psychiatric hospitalization     5 previous hospitalizations.  Last was in 2015 [facility]    HTN (hypertension) 11/6/2014    Hx of psychiatric care     Celexa, Zoloft, Seroquel     Psychiatric problem     depression and anxiety    Stroke     right side weakness    Therapy     reports last saw psychiatrist last year and he does not remember the name     Past Surgical History:   Procedure Laterality Date    APPENDECTOMY      FOOT SURGERY       Family History   Problem Relation Age of Onset    Drug abuse Mother     Heart disease Mother     Alcohol abuse Father     Heart disease Father      Social History   Substance Use Topics    Smoking status: Current Every Day  Smoker     Packs/day: 1.00     Years: 43.00     Types: Cigarettes    Smokeless tobacco: Not on file      Comment: patient ran out of [employer]    Alcohol use Yes      Comment: Occasionally, last use was 3 days ago     Review of Systems   Constitutional: Negative for chills and fever.   HENT: Positive for dental problem. Negative for trouble swallowing.    Skin: Negative for color change and wound.   Neurological: Negative for weakness and numbness.       Physical Exam     Initial Vitals [06/29/17 1508]   BP Pulse Resp Temp SpO2   110/75 85 18 98.9 °F (37.2 °C) 100 %      MAP       86.67         Physical Exam    Nursing note and vitals reviewed.  Constitutional: Vital signs are normal. He appears well-developed and well-nourished. He is cooperative.   HENT:   Head: Normocephalic and atraumatic.       Mouth/Throat: Mucous membranes are normal. Abnormal dentition. Dental caries present.   Diffuse poor dentition with multiple caries, but especially of the right mandibular premolars and first molar.  There is gingival swelling and tenderness to palpation to this area.   Lymphadenopathy:        Head (right side): No submental, no submandibular, no preauricular and no posterior auricular adenopathy present.        Head (left side): No submental, no submandibular, no preauricular and no posterior auricular adenopathy present.     He has no cervical adenopathy.   Neurological: He is alert and oriented to person, place, and time.   Skin: Skin is warm and dry.         ED Course   Procedures  Labs Reviewed - No data to display     Patient reports that he had taken several extra doses of the Norco 5/325 g that in previously prescribed to him, as well as intermittent Tylenol for fever.  I advised him that I would prescribe him 10 tablets of Norco 7.5/325 mg to be taken one by mouth no more often than 4 times a day.  He can take over-the-counter naproxen 2 tablets twice a day as needed for inflammation or fever but I advised him  to avoid any Tylenol outside of what is contained in the Norco.                       ED Course     Clinical Impression:   The encounter diagnosis was Dental abscess.    Disposition:   Disposition: Discharged  Condition: Stable                   Tona Thomson MD  06/29/17 1030

## 2017-06-29 NOTE — TELEPHONE ENCOUNTER
----- Message from Franchesca Obando sent at 6/29/2017  8:57 AM CDT -----  Contact: Self/[phone]  Patient dropped off a form to [facility] yesterday and  Patient needs it signed and faxed to the number on the form in order to get the dental work performed. Patient is at the dentist office in a lot of pain and needs asap. Please advise.    Thanks

## 2017-06-30 ENCOUNTER — TELEPHONE (OUTPATIENT)
Dept: INTERNAL MEDICINE | Facility: CLINIC | Age: 52
End: 2017-06-30

## 2017-06-30 NOTE — TELEPHONE ENCOUNTER
----- Message from Jennifer Romero MA sent at 6/29/2017  8:50 AM CDT -----  Contact: [phone]  Please advise Pt stated that he left a Paper with the Office on Yesterday regarding his Dental needs. Please call. Thanks!

## 2017-07-07 ENCOUNTER — TELEPHONE (OUTPATIENT)
Dept: HEPATOLOGY | Facility: CLINIC | Age: 52
End: 2017-07-07

## 2017-07-07 NOTE — TELEPHONE ENCOUNTER
"Spoke to pt. Offered to schedule Hep C consult. Pt refused, stating, "No I don't need an appointment. I'm alright" and ended call.    Referral will be deferred at this time.   "

## 2017-07-13 LAB
HCV LOG: 5.98 LOG (10) IU/ML
HCV RNA QUANT PCR: ABNORMAL IU/ML
HCV, QUALITATIVE: DETECTED IU/ML

## 2017-07-28 DIAGNOSIS — Z12.11 COLON CANCER SCREENING: ICD-10-CM

## 2017-07-30 ENCOUNTER — HOSPITAL ENCOUNTER (EMERGENCY)
Facility: OTHER | Age: 52
Discharge: HOME | End: 2017-07-30
Attending: EMERGENCY MEDICINE
Payer: MEDICAID

## 2017-07-30 VITALS
HEIGHT: 62 IN | OXYGEN SATURATION: 99 % | SYSTOLIC BLOOD PRESSURE: 128 MMHG | BODY MASS INDEX: 30.91 KG/M2 | RESPIRATION RATE: 16 BRPM | DIASTOLIC BLOOD PRESSURE: 70 MMHG | HEART RATE: 77 BPM | TEMPERATURE: 97 F | WEIGHT: 168 LBS

## 2017-07-30 DIAGNOSIS — K08.89 PAIN, DENTAL: Primary | ICD-10-CM

## 2017-07-30 DIAGNOSIS — K02.9 DENTAL CARIES: ICD-10-CM

## 2017-07-30 PROCEDURE — 99283 EMERGENCY DEPT VISIT LOW MDM: CPT

## 2017-07-30 RX ORDER — HYDROCODONE BITARTRATE AND ACETAMINOPHEN 5; 325 MG/1; MG/1
1 TABLET ORAL EVERY 6 HOURS PRN
Qty: 12 TABLET | Refills: 0 | Status: ON HOLD | OUTPATIENT
Start: 2017-07-30 | End: 2018-04-10 | Stop reason: HOSPADM

## 2017-07-30 RX ORDER — CLINDAMYCIN HYDROCHLORIDE 150 MG/1
300 CAPSULE ORAL EVERY 6 HOURS
Qty: 56 CAPSULE | Refills: 0 | Status: SHIPPED | OUTPATIENT
Start: 2017-07-30 | End: 2017-08-06

## 2017-07-30 NOTE — DISCHARGE INSTRUCTIONS
Drink plenty of fluids. Return for any new or acute problems or concerns. See your dentist and pcp as soon as possible, as discussed.

## 2017-08-05 NOTE — ED PROVIDER NOTES
Encounter Date: 7/30/2017       History     Chief Complaint   Patient presents with    Dental Pain     left upper and lower dental pain, chronic poor dental caries      Mr Lemon reports L upper and L lower dental pain, mostly lower. Reports hx of chronic dental caries and is having trouble getting in to see a dentist due to finances. Reports last on abx ~1mo ago. Home otc pain meds not helping. Reports onset of pain yesterday similar to many prior episodes. Denies facial swelling, numnbess, inability to swallow or fevers.       The history is provided by the patient.     Review of patient's allergies indicates:   Allergen Reactions    Aspirin Shortness Of Breath    Toradol [ketorolac] Shortness Of Breath    Toradol [ketorolac] Anaphylaxis    Aspirin     Codeine     Penicillins     Shellfish containing products     Sulfa (sulfonamide antibiotics)      Past Medical History:   Diagnosis Date    Anxiety     Depression     Heart murmur     Hepatitis C     History of psychiatric hospitalization     5 previous hospitalizations.  Last was in 2015 at [facility]    HTN (hypertension) 11/6/2014    Hx of psychiatric care     Celexa, Zoloft, Seroquel     Psychiatric problem     depression and anxiety    Stroke     right side weakness    Therapy     reports last saw psychiatrist last year and he does not remember the name     Past Surgical History:   Procedure Laterality Date    APPENDECTOMY      FOOT SURGERY       Family History   Problem Relation Age of Onset    Drug abuse Mother     Heart disease Mother     Alcohol abuse Father     Heart disease Father      Social History   Substance Use Topics    Smoking status: Current Every Day Smoker     Packs/day: 1.00     Years: 43.00     Types: Cigarettes    Smokeless tobacco: Never Used      Comment: patient ran out of Chantix    Alcohol use Yes      Comment: Occasionally, last use was 3 days ago     Review of Systems   Constitutional: Negative.    HENT:  Positive for dental problem. Negative for drooling, facial swelling, mouth sores, postnasal drip, sore throat and trouble swallowing.    All other systems reviewed and are negative.      Physical Exam     Initial Vitals [07/30/17 0902]   BP Pulse Resp Temp SpO2   123/80 79 19 98 °F (36.7 °C) 98 %      MAP       94.33         Physical Exam    Nursing note and vitals reviewed.  Constitutional: He appears well-developed and well-nourished. He is not diaphoretic. No distress.   HENT:   Head: Normocephalic and atraumatic.   Mouth/Throat: Oropharynx is clear and moist. No oropharyngeal exudate.   Poor dentition diffusely w carious teeth. No gum swelling or abscesses noted. Pain to L lower teeth, several, caries noted w gumline erythema. Normal floor of the mouth and normal tongue   Eyes: EOM are normal. Pupils are equal, round, and reactive to light.   Neck: Normal range of motion. Neck supple.   Cardiovascular: Normal rate, regular rhythm and normal heart sounds.   No murmur heard.  Pulmonary/Chest: Breath sounds normal. No respiratory distress. He has no wheezes. He has no rales.   Abdominal: Soft. He exhibits no distension. There is no tenderness.   Musculoskeletal: Normal range of motion. He exhibits no edema or tenderness.   Neurological: He is alert and oriented to person, place, and time.   Skin: Skin is warm. Capillary refill takes less than 2 seconds. No rash noted. No erythema.   Psychiatric: He has a normal mood and affect. His behavior is normal. Thought content normal.         ED Course   Procedures  Labs Reviewed - No data to display          Medical Decision Making:   ED Management:  Mr Bentley is stable for d/c. We discusse dhome care and need to see dentist asap, will likely need pullings. Discussed worrisome signs that should prompt need to return. There is no indication for further emergent intervention or evaluation at this time.                      ED Course     Clinical Impression:   The primary  encounter diagnosis was Pain, dental. A diagnosis of Dental caries was also pertinent to this visit.                           Raza Sanchez MD  08/05/17 1157       Raza Sanchez MD  08/05/17 1152

## 2017-08-13 ENCOUNTER — HOSPITAL ENCOUNTER (EMERGENCY)
Facility: OTHER | Age: 52
Discharge: HOME | End: 2017-08-13
Attending: EMERGENCY MEDICINE
Payer: MEDICAID

## 2017-08-13 VITALS
BODY MASS INDEX: 27 KG/M2 | TEMPERATURE: 99 F | HEIGHT: 66 IN | SYSTOLIC BLOOD PRESSURE: 146 MMHG | WEIGHT: 168 LBS | DIASTOLIC BLOOD PRESSURE: 88 MMHG | OXYGEN SATURATION: 99 % | RESPIRATION RATE: 18 BRPM | HEART RATE: 73 BPM

## 2017-08-13 DIAGNOSIS — T14.8XXA ABRASION: ICD-10-CM

## 2017-08-13 DIAGNOSIS — S00.93XA CONTUSION OF HEAD, UNSPECIFIED PART OF HEAD, INITIAL ENCOUNTER: ICD-10-CM
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
## 2017-08-13 DIAGNOSIS — S06.0X1A CONCUSSION, WITH LOC OF 30 MIN OR LESS, INITIAL ENCOUNTER: ICD-10-CM

## 2017-08-13 LAB
ALBUMIN SERPL-MCNC: 3.3 G/DL (ref 3.3–5.5)
ALP SERPL-CCNC: 46 U/L (ref 42–141)
BILIRUB SERPL-MCNC: 0.5 MG/DL (ref 0.2–1.6)
BILIRUBIN, POC UA: NEGATIVE
BLOOD, POC UA: NEGATIVE
BUN SERPL-MCNC: 14 MG/DL (ref 7–22)
CALCIUM SERPL-MCNC: 8.7 MG/DL (ref 8–10.3)
CHLORIDE SERPL-SCNC: 104 MMOL/L (ref 98–108)
CLARITY, POC UA: CLEAR
COLOR, POC UA: YELLOW
CREAT SERPL-MCNC: 1.2 MG/DL (ref 0.6–1.2)
GLUCOSE SERPL-MCNC: 91 MG/DL (ref 73–118)
GLUCOSE, POC UA: NEGATIVE
KETONES, POC UA: NEGATIVE
LEUKOCYTE EST, POC UA: NEGATIVE
NITRITE, POC UA: NEGATIVE
PH UR STRIP: 6 [PH]
POC ALT (SGPT): 30 U/L (ref 10–47)
POC AST (SGOT): 41 U/L (ref 11–38)
POC CARDIAC TROPONIN I: 0.01 NG/ML
POC PTINR: 1.2 (ref 0.9–1.2)
POC PTWBT: 14.7 SEC (ref 9.7–14.3)
POC TCO2: 22 MMOL/L (ref 18–33)
POTASSIUM BLD-SCNC: 4.8 MMOL/L (ref 3.6–5.1)
PROTEIN, POC UA: NEGATIVE
PROTEIN, POC: 6.8 G/DL (ref 6.4–8.1)
SAMPLE: ABNORMAL
SAMPLE: NORMAL
SODIUM BLD-SCNC: 138 MMOL/L (ref 128–145)
SPECIFIC GRAVITY, POC UA: 1.01
UROBILINOGEN, POC UA: 0.2 E.U./DL

## 2017-08-13 PROCEDURE — 93010 ELECTROCARDIOGRAM REPORT: CPT | Mod: ,,, | Performed by: INTERNAL MEDICINE

## 2017-08-13 PROCEDURE — 96375 TX/PRO/DX INJ NEW DRUG ADDON: CPT

## 2017-08-13 PROCEDURE — 25000003 PHARM REV CODE 250: Performed by: EMERGENCY MEDICINE

## 2017-08-13 PROCEDURE — 85610 PROTHROMBIN TIME: CPT

## 2017-08-13 PROCEDURE — 80053 COMPREHEN METABOLIC PANEL: CPT

## 2017-08-13 PROCEDURE — 85025 COMPLETE CBC W/AUTO DIFF WBC: CPT

## 2017-08-13 PROCEDURE — 96361 HYDRATE IV INFUSION ADD-ON: CPT

## 2017-08-13 PROCEDURE — 93005 ELECTROCARDIOGRAM TRACING: CPT

## 2017-08-13 PROCEDURE — 84484 ASSAY OF TROPONIN QUANT: CPT

## 2017-08-13 PROCEDURE — 63600175 PHARM REV CODE 636 W HCPCS: Performed by: EMERGENCY MEDICINE

## 2017-08-13 PROCEDURE — 96374 THER/PROPH/DIAG INJ IV PUSH: CPT

## 2017-08-13 PROCEDURE — 81003 URINALYSIS AUTO W/O SCOPE: CPT

## 2017-08-13 PROCEDURE — 99285 EMERGENCY DEPT VISIT HI MDM: CPT | Mod: 25

## 2017-08-13 RX ORDER — MORPHINE SULFATE 2 MG/ML
6 INJECTION, SOLUTION INTRAMUSCULAR; INTRAVENOUS
Status: COMPLETED | OUTPATIENT
Start: 2017-08-13 | End: 2017-08-13

## 2017-08-13 RX ORDER — NAPROXEN 500 MG/1
500 TABLET ORAL 2 TIMES DAILY WITH MEALS
Qty: 20 TABLET | Refills: 0 | Status: SHIPPED | OUTPATIENT
Start: 2017-08-13 | End: 2018-12-11

## 2017-08-13 RX ORDER — HYDROCODONE BITARTRATE AND ACETAMINOPHEN 5; 325 MG/1; MG/1
1 TABLET ORAL
Status: COMPLETED | OUTPATIENT
Start: 2017-08-13 | End: 2017-08-13

## 2017-08-13 RX ORDER — ONDANSETRON 2 MG/ML
4 INJECTION INTRAMUSCULAR; INTRAVENOUS
Status: COMPLETED | OUTPATIENT
Start: 2017-08-13 | End: 2017-08-13

## 2017-08-13 RX ORDER — MUPIROCIN 20 MG/G
OINTMENT TOPICAL 3 TIMES DAILY
Qty: 1 TUBE | Refills: 0 | Status: SHIPPED | OUTPATIENT
Start: 2017-08-13 | End: 2017-08-23

## 2017-08-13 RX ORDER — CYCLOBENZAPRINE HCL 10 MG
10 TABLET ORAL 3 TIMES DAILY PRN
Qty: 15 TABLET | Refills: 0 | Status: SHIPPED | OUTPATIENT
Start: 2017-08-13 | End: 2017-08-18

## 2017-08-13 RX ORDER — ACETAMINOPHEN 500 MG
500 TABLET ORAL EVERY 6 HOURS PRN
Qty: 30 TABLET | Refills: 0 | Status: ON HOLD | OUTPATIENT
Start: 2017-08-13 | End: 2018-04-10 | Stop reason: HOSPADM

## 2017-08-13 RX ADMIN — MORPHINE SULFATE 6 MG: 2 INJECTION, SOLUTION INTRAMUSCULAR; INTRAVENOUS at 08:08

## 2017-08-13 RX ADMIN — HYDROCODONE BITARTRATE AND ACETAMINOPHEN 1 TABLET: 5; 325 TABLET ORAL at 11:08

## 2017-08-13 RX ADMIN — ONDANSETRON 4 MG: 2 INJECTION INTRAMUSCULAR; INTRAVENOUS at 08:08

## 2017-08-13 RX ADMIN — SODIUM CHLORIDE 1000 ML: 0.9 INJECTION, SOLUTION INTRAVENOUS at 08:08

## 2017-08-13 NOTE — ED TRIAGE NOTES
"To ed with c/o left facial pain, chest pain, suprapubic pain s/p fall yesterday. States "I blecked out while I was walking across a parking lot. I think it was from the heat." abrasions noted to left face. Abrasion x2 ~ 1cm each noted to suprapubic area. Pupils 2/brisk. Reports headache, rates 10/10. Has not taken anything for pain. BBS-CTA. NSR noted on cardiac monitor. Bed in lowest position/locked. sr up x2. Call bell in reach.   "

## 2017-08-13 NOTE — ED PROVIDER NOTES
Encounter Date: 8/13/2017       History     Chief Complaint   Patient presents with    Facial Injury    Chest Pain     Larry Lemon is a 51 y.o. male who presents to the Emergency Department with  fall, loss of hot consciousness, dehydration.  Patient states he's taking antibiotics for his dental infection he worked out in the heat all day yesterday tripped while walking fell over and hit his belly and his face really hard.  Patient states he was unconscious for a few minutes per witnesses.  Patient states he didn't want come in today by his wife made him      The history is provided by the patient.   Fall   The accident occurred yesterday. The fall occurred while walking. He fell from a height of 3 to 5 ft. He landed on concrete. The volume of blood lost was moderate. The point of impact was the head and face (pelvis and right knee). The pain is present in the face, head, chest and right knee. Associated symptoms include neck pain, abdominal pain, nausea, headaches and loss of consciousness. Pertinent negatives include no fever and no vomiting. The symptoms are aggravated by activity and pressure on the injury. He has tried nothing for the symptoms. The treatment provided no relief.     Review of patient's allergies indicates:   Allergen Reactions    Aspirin Shortness Of Breath    Codeine Shortness Of Breath    Penicillins Shortness Of Breath and Rash    Toradol [ketorolac] Anaphylaxis    Shellfish containing products Nausea And Vomiting    Sulfa (sulfonamide antibiotics)      Past Medical History:   Diagnosis Date    Anxiety     Depression     Heart murmur     Hepatitis C     History of psychiatric hospitalization     5 previous hospitalizations.  Last was in 2015 at [facility]    HTN (hypertension) 11/6/2014    Hx of psychiatric care     Celexa, Zoloft, Seroquel     Psychiatric problem     depression and anxiety    Stroke     right side weakness    Therapy     reports last saw psychiatrist  last year and he does not remember the name     Past Surgical History:   Procedure Laterality Date    APPENDECTOMY      FOOT SURGERY       Family History   Problem Relation Age of Onset    Drug abuse Mother     Heart disease Mother     Alcohol abuse Father     Heart disease Father      Social History   Substance Use Topics    Smoking status: Current Every Day Smoker     Packs/day: 1.00     Years: 43.00     Types: Cigarettes    Smokeless tobacco: Never Used      Comment: patient ran out of [employer]    Alcohol use Yes      Comment: Occasionally, last use was 3 days ago     Review of Systems   Constitutional: Negative for chills and fever.   HENT: Negative for trouble swallowing.    Eyes: Negative for visual disturbance.   Respiratory: Negative for shortness of breath.    Cardiovascular: Positive for chest pain.   Gastrointestinal: Positive for abdominal pain and nausea. Negative for vomiting.   Genitourinary: Negative for dysuria.   Musculoskeletal: Positive for arthralgias and neck pain.   Skin: Positive for wound.   Neurological: Positive for loss of consciousness, syncope and headaches.   Psychiatric/Behavioral: Negative for agitation.   All other systems reviewed and are negative.      Physical Exam     Initial Vitals [08/13/17 0812]   BP Pulse Resp Temp SpO2   (!) 147/97 89 18 99.8 °F (37.7 °C) 99 %      MAP       113.67         Physical Exam    Nursing note and vitals reviewed.  Constitutional: Vital signs are normal. He appears well-developed and well-nourished. He does not appear ill. He appears distressed.   HENT:   Head: Normocephalic. Head is with abrasion and with contusion.       Right Ear: Hearing, tympanic membrane and external ear normal.   Left Ear: Hearing, tympanic membrane and external ear normal.   Nose: No nose lacerations. No epistaxis. Left sinus exhibits maxillary sinus tenderness and frontal sinus tenderness.   Mouth/Throat: Uvula is midline and oropharynx is clear and moist.   Eyes:  Conjunctivae and EOM are normal. Pupils are equal, round, and reactive to light.   Neck: Trachea normal. Neck supple. No stridor present.   Cardiovascular: Normal rate, regular rhythm, S1 normal, S2 normal, intact distal pulses and normal pulses. Exam reveals no gallop and no friction rub.    No murmur heard.  Pulmonary/Chest: Breath sounds normal. No respiratory distress. He has no wheezes. He has no rhonchi. He has no rales. He exhibits tenderness.   Abdominal: Soft. Bowel sounds are normal. He exhibits no distension and no mass. There is tenderness. There is no rigidity, no rebound and no guarding.   Musculoskeletal: Normal range of motion. He exhibits edema and tenderness.   Lymphadenopathy:     He has no cervical adenopathy.   Neurological: He is alert and oriented to person, place, and time. He has normal strength and normal reflexes. He displays normal reflexes. No cranial nerve deficit or sensory deficit.   Skin: Skin is warm and dry. Capillary refill takes less than 2 seconds. Abrasion noted. No rash noted.        Psychiatric: He has a normal mood and affect. His speech is normal and behavior is normal.         ED Course   Procedures  Labs Reviewed   POCT URINALYSIS W/O SCOPE - Abnormal; Notable for the following:        Result Value    Glucose, UA Negative (*)     Bilirubin, UA Negative (*)     Ketones, UA Negative (*)     Blood, UA Negative (*)     Protein, UA Negative (*)     Nitrite, UA Negative (*)     Leukocytes, UA Negative (*)     All other components within normal limits   ISTAT PROCEDURE - Abnormal; Notable for the following:     POC PTWBT 14.7 (*)     All other components within normal limits   POCT CMP - Abnormal; Notable for the following:     Albumin, POC 3.3 (*)     AST (SGOT), POC 41 (*)     All other components within normal limits   TROPONIN ISTAT   POCT CBC   POCT URINALYSIS W/O SCOPE   POCT CMP   POCT PROTIME-INR   POCT TROPONIN     CMP sodium 138, potassium 4.8, glucose 91, be UN 14  cutting 1.2  0.01 INR 1.2 CBC White blood cell count 12, hemoglobin 12.6, hematocrit 38.8 and platelets 182    EKG Readings: (Independently Interpreted)   Initial Reading: No STEMI. Rhythm: Normal Sinus Rhythm. Heart Rate: 80. Axis: Normal. Clinical Impression: Normal Sinus Rhythm Other Impression: EKG QTC 43 which is normal.     Imaging Results          X-Ray Chest PA And Lateral (Final result)  Result time 08/13/17 09:44:01    Final result by Art Plascencia DO (08/13/17 09:44:01)                 Impression:        1.  No acute cardiopulmonary process.      Electronically signed by: ART PLASCENCIA D.O.  Date:     08/13/17  Time:    09:44              Narrative:    2 view chest    Comparison: 05/11/2017    Findings: The lungs are clear and free of infiltrate.  No pleural effusion or pneumothorax is identified.  The heart is mildly enlarged.                             CT Head Without Contrast (Final result)  Result time 08/13/17 09:46:50    Final result by Lázaro Rosario MD (08/13/17 09:46:50)                 Impression:        Normal head CT.  No new findings compared with 11/14/2014        Electronically signed by: LÁZARO ROSARIO MD  Date:     08/13/17  Time:    09:46              Narrative:    Exam: CT head without contrast    History: Head trauma    Findings: Noncontrast head CT demonstrates that the ventricles are normal in size.  The brain has normal attenuation with no hemorrhage, infarction, tumor or edema.  No extra-axial lesions are seen, and both the calvarium and skull base structures are unremarkable.  Tiny incidental right basal ganglia calcifications noted, as before.                             CT Cervical Spine Without Contrast (Final result)  Result time 08/13/17 09:54:50    Final result by Milo Pedraza MD (08/13/17 09:54:50)                 Impression:      1. No displaced fracture.    2. Mild grade 1 anterior listhesis of C7 over T1, probably chronic. Moderate left C7-T1 facet DJD.        Electronically signed by: RENETTA CASAS MD  Date:     08/13/17  Time:    09:54              Narrative:    CT cervical spine without contrast    Technique: Helical CT images were obtained of the cervical spine without IV contrast.    Comparison: None    Findings:   The C7 vertebra demonstrates mild grade 1 anterior listhesis over T1. The cervical spine is otherwise normal in alignment, and vertebral body heights are maintained. No displaced fracture.    Mild multilevel cervical spine degenerative disc disease and uncovertebral DJD, most prominent at levels C5-6 and C6-7. Moderate left-sided facet DJD at C7-T1.    The soft tissues are unremarkable.                             CT Maxillofacial Without Contrast (Final result)  Result time 08/13/17 09:44:35    Final result by Lázaro Prieto MD (08/13/17 09:44:35)                 Impression:        No acute fracture.      Dental abnormalities including multiple periapical lucencies and caries, dental consultation suggested      Electronically signed by: LÁZARO PRIETO MD  Date:     08/13/17  Time:    09:44              Narrative:    Exam: CT of the facial bones without contrast    History: History of fall with facial contusion on the left, rule out fracture    Findings: A mild soft tissue swelling over the left chin is present.  No mandible or other facial bone fracture is seen.  Patient has multiple dental caries and lucency is seen associated with the roots of multiple teeth, most notably a the right maxillary premolars and left mandibular molars.  Dental consultation recommended.    Sinuses are well-aerated.  Nasal fossa is is clear.  There is a leftward septal spur present.                             CT Renal Stone Study ABD Pelvis WO (Final result)  Result time 08/13/17 09:43:18    Final result by Art Hale DO (08/13/17 09:43:18)                 Impression:        1. No acute pathology noted within the abdomen or pelvis.    2.  Diverticulosis without  evidence for diverticulitis.          Electronically signed by: ELSY PLASCENCIA D.O.  Date:     08/13/17  Time:    09:43              Narrative:    CT of the abdomen and pelvis without contrast    History: Abdominal pain    Comparison: Study from 06/16/2017    Technique:CT of the Abdomen and Pelvis was acquired helically without IV contrast from the lung bases through the ischial tuberosities. Axial and reformatted images were reviewed.     Findings:    ABDOMEN    Lung bases:Mild dependent changes noted within both lung bases.    Liver/gallbladder/biliary:The liver demonstrates a negative noncontrast appearance. The gallbladder is present and unremarkable.No biliary ductal dilation.    Pancreas:The pancreas demonstrates a negative noncontrast appearance.    Spleen:The spleen is not enlarged.    Adrenals:Unremarkable    Kidneys:The kidneys are symmetric in size and demonstrate no evidence for nephrolithiasis or obstructive uropathy.    Bowel/Mesentery:There is no evidence of bowel obstruction.  There is diverticulosis of the sigmoid colon.  No diverticulitis.  No mesenteric stranding or adenopathy.    Retroperitoneum:No adenopathy.There is minimal vascular calcification seen involving the aorta.    PELVIS:    Genitourinary/Reproductive organs:Unremarkable    Adenopathy:None    Free Fluid:No free fluid    Osseus Structures/Soft tissues:No suspicious appearing osseus lesions. No significant soft tissue abnormality.                                                   ED Course       Medical decision making   Chief complaint:fall with LOC.  Multiple contusions, wounds,  and abrasions.  Differential diagnosis:abrasion, laceration, strain, sprain, fracture.   Treatment in the ED Physical Exam, morphine, Zofran, 1L NS.   Patient reports feeling better after medication.    Discussed labs, and imaging results.   Patient requested Norco prescription but he has had multiple Narcotic prescriptions written by multiple physicians.      Fill and take prescriptions as directed.  Return to the ED if symptoms worsen or do not resolve.   Answered questions and discussed discharge plan.    Patient feels much better and is ready for discharge.  Follow up with PCP in 1 days.    Clinical Impression:   The primary encounter diagnosis was Fall, initial encounter. Diagnoses of Concussion, with LOC of 30 min or less, initial encounter, Abrasion, and Contusion of head, unspecified part of head, initial encounter were also pertinent to this visit.                           Tanesha Vang,   08/14/17 3999

## 2017-08-21 ENCOUNTER — HOSPITAL ENCOUNTER (EMERGENCY)
Facility: OTHER | Age: 52
Discharge: HOME | End: 2017-08-21
Attending: EMERGENCY MEDICINE
Payer: MEDICAID

## 2017-08-21 VITALS
HEIGHT: 65 IN | WEIGHT: 168 LBS | SYSTOLIC BLOOD PRESSURE: 123 MMHG | RESPIRATION RATE: 17 BRPM | BODY MASS INDEX: 27.99 KG/M2 | DIASTOLIC BLOOD PRESSURE: 84 MMHG | TEMPERATURE: 98 F | OXYGEN SATURATION: 100 % | HEART RATE: 74 BPM

## 2017-08-21 DIAGNOSIS — R06.02 SOB (SHORTNESS OF BREATH): ICD-10-CM

## 2017-08-21 DIAGNOSIS — R07.89 LEFT-SIDED CHEST WALL PAIN: ICD-10-CM

## 2017-08-21 PROCEDURE — 99284 EMERGENCY DEPT VISIT MOD MDM: CPT

## 2017-08-21 RX ORDER — BENZONATATE 100 MG/1
100 CAPSULE ORAL 3 TIMES DAILY PRN
Qty: 20 CAPSULE | Refills: 0 | Status: SHIPPED | OUTPATIENT
Start: 2017-08-21 | End: 2017-08-31

## 2017-08-21 NOTE — ED NOTES
Pt identifiers checked and correct.    LOC: The patient is awake, alert and aware of environment with an appropriate affect, the patient is oriented x 3 and speaking appropriately.   APPEARANCE: Patient resting comfortably and in no acute distress, patient is clean and well groomed, patient's clothing is properly fastened.   SKIN: The skin is warm and dry, color consistent with ethnicity, patient has normal skin turgor and moist mucus membranes, skin intact, no breakdown or bruising noted.   MUSCULOSKELETAL: Patient moving all extremities spontaneously, no obvious swelling or deformities noted. No bruising noted S/P fall  RESPIRATORY: Airway is open and patent, respirations are spontaneous, coarse all fields and pain w/ deep inspiration / patient has a normal effort and rate   CARDIAC: Patient has a normal rate and regular rhythm, no periphreal edema noted, capillary refill < 3 seconds.   ABDOMEN: Soft and non tender to palpation, no distention noted, active bowel sounds present in all four quadrants.   NEUROLOGIC: PERRL, 3 mm bilaterally, eyes open spontaneously, behavior appropriate to situation, follows commands, facial expression symmetrical, bilateral hand grasp equal and even, purposeful motor response noted, normal sensation in all extremities when touched with a finger.

## 2017-08-21 NOTE — ED PROVIDER NOTES
Encounter Date: 8/21/2017       History     Chief Complaint   Patient presents with    Breathing Problem     left upper rib problem, with left upper back pain s/p fall last week      Chief complaint: Chest soreness  51-year-old complains of pain and soreness to the left side of his chest.  Patient was seen here on August 13 after he fell.  Patient had multiple x-rays and CAT scans at that time all which were negative.  Patient was also seen at another emergency room on August 15 and was prescribed tramadol.  Patient said he took all these medicines and is still having pain.  Patient reports a nonproductive cough.  No fever.  No shortness of breath.  Patient says he hasn't smoked since his fall.          Review of patient's allergies indicates:   Allergen Reactions    Aspirin Shortness Of Breath    Codeine Shortness Of Breath    Penicillins Shortness Of Breath and Rash    Toradol [ketorolac] Anaphylaxis    Shellfish containing products Nausea And Vomiting    Sulfa (sulfonamide antibiotics)      Past Medical History:   Diagnosis Date    Anxiety     Depression     Heart murmur     Hepatitis C     History of psychiatric hospitalization     5 previous hospitalizations.  Last was in 2015 at [facility]    HTN (hypertension) 11/6/2014    Hx of psychiatric care     Celexa, Zoloft, Seroquel     Psychiatric problem     depression and anxiety    Stroke     right side weakness    Therapy     reports last saw psychiatrist last year and he does not remember the name     Past Surgical History:   Procedure Laterality Date    APPENDECTOMY      FOOT SURGERY       Family History   Problem Relation Age of Onset    Drug abuse Mother     Heart disease Mother     Alcohol abuse Father     Heart disease Father      Social History   Substance Use Topics    Smoking status: Current Every Day Smoker     Packs/day: 1.00     Years: 43.00     Types: Cigarettes    Smokeless tobacco: Never Used      Comment: patient ran out of  [employer]    Alcohol use Yes      Comment: Occasionally, last use was 3 days ago     Review of Systems   Constitutional: Positive for chills. Negative for fever.   HENT: Negative for congestion.    Respiratory: Positive for cough. Negative for shortness of breath.    Cardiovascular: Positive for chest pain (left-sided chest wall pain).   Gastrointestinal: Negative for abdominal pain and vomiting.   Musculoskeletal: Negative for neck pain.   All other systems reviewed and are negative.      Physical Exam     Initial Vitals [08/21/17 0827]   BP Pulse Resp Temp SpO2   123/84 74 17 97.5 °F (36.4 °C) 100 %      MAP       97         Physical Exam    Constitutional: He appears well-developed and well-nourished.   HENT:   Head: Normocephalic and atraumatic.   Eyes: EOM are normal. Pupils are equal, round, and reactive to light.   Neck: Neck supple.   Cardiovascular: Normal rate, regular rhythm and normal heart sounds.   Pulmonary/Chest: Breath sounds normal. No respiratory distress. He has no wheezes.   Abdominal: Soft. There is no tenderness. There is no rebound and no guarding.   Musculoskeletal: Normal range of motion.        Back:    No swelling or ecchymosis noted to left chest   Neurological: He is alert and oriented to person, place, and time. No cranial nerve deficit.   Skin: Skin is warm and dry.   Psychiatric: He has a normal mood and affect.         ED Course   Procedures  Labs Reviewed - No data to display          Medical Decision Making:   Initial Assessment:   51-year-old who complains of pain to the left chest.  Patient was seen here 8 days ago and had a thorough workup after he fell.  On exam patient's vital signs are stable.  Oxygenation is 100%.  Lungs are clear.  He has tenderness to his left chest wall but no edema or ecchymosis.  No crepitus  Clinical Tests:   Radiological Study: Ordered and Reviewed  Medical Tests: Ordered and Reviewed  ED Management:  EKG shows sinus bradycardia.  No evidence of  STEMI.  Chest x-ray is normal as well.  Patient is ALLERGIC to Toradol.  He has had multiple prescriptions for controlled substances over the last few months.  Therefore I am unable to rx narcotics.   We will check x-ray of ribs.  Patient first said that his wife is coming to pick him up and then a few minutes later said she is unable to do so.  At this point, patient refused  rib x-rays.  He will be discharged on Tessalon Perles.                   ED Course     Clinical Impression:   Diagnoses of SOB (shortness of breath) and Left-sided chest wall pain were pertinent to this visit.                           Sheryl Zhao MD  08/21/17 0335

## 2017-08-22 ENCOUNTER — HOSPITAL ENCOUNTER (EMERGENCY)
Facility: HOSPITAL | Age: 52
Discharge: HOME | End: 2017-08-22
Attending: EMERGENCY MEDICINE
Payer: MEDICAID

## 2017-08-22 VITALS
BODY MASS INDEX: 27.23 KG/M2 | HEART RATE: 86 BPM | WEIGHT: 148 LBS | TEMPERATURE: 98 F | HEIGHT: 62 IN | SYSTOLIC BLOOD PRESSURE: 141 MMHG | RESPIRATION RATE: 18 BRPM | DIASTOLIC BLOOD PRESSURE: 81 MMHG | OXYGEN SATURATION: 100 %

## 2017-08-22 DIAGNOSIS — G89.29 OTHER CHRONIC PAIN: ICD-10-CM

## 2017-08-22 DIAGNOSIS — R07.89 LEFT-SIDED CHEST WALL PAIN: Primary | ICD-10-CM

## 2017-08-22 DIAGNOSIS — R07.9 CHEST PAIN: ICD-10-CM

## 2017-08-22 PROCEDURE — 93010 ELECTROCARDIOGRAM REPORT: CPT | Mod: ,,, | Performed by: INTERNAL MEDICINE

## 2017-08-22 PROCEDURE — 99283 EMERGENCY DEPT VISIT LOW MDM: CPT | Mod: 25

## 2017-08-22 PROCEDURE — 99284 EMERGENCY DEPT VISIT MOD MDM: CPT | Mod: ,,, | Performed by: EMERGENCY MEDICINE

## 2017-08-22 PROCEDURE — 93005 ELECTROCARDIOGRAM TRACING: CPT

## 2017-08-22 RX ORDER — IBUPROFEN 600 MG/1
600 TABLET ORAL EVERY 6 HOURS PRN
Qty: 20 TABLET | Refills: 0 | Status: SHIPPED | OUTPATIENT
Start: 2017-08-22 | End: 2017-08-29

## 2017-08-22 NOTE — ED TRIAGE NOTES
Left sided chest wall pain that is made worse with coughing.  States that he has been sick since Saturday.    GENERAL: The patient is well-developed and well-nourished in no apparent distress. Alert and oriented x4.                                                HEENT: Head is normocephalic and atraumatic. Extraocular muscles are intact. Pupils are equal, round, and reactive to light and accommodation. Nares appeared normal. Mouth is well hydrated and without lesions. Mucous membranes are moist. Posterior pharynx clear of any exudate or lesions.    NECK: Supple. No carotid bruits. No lymphadenopathy or thyromegaly.    LUNGS: Clear to auscultation.    HEART: Regular rate and rhythm without murmur.     ABDOMEN: Soft, nontender, and nondistended. Positive bowel sounds. No hepatosplenomegaly was noted.     EXTREMITIES: Without any cyanosis, clubbing, rash, lesions or edema.     NEUROLOGIC: Cranial nerves II through XII are grossly intact.     PSYCHIATRIC: Flat affect, but denies suicidal or homicidal ideations.    SKIN: No ulceration or induration present.

## 2017-08-22 NOTE — ED PROVIDER NOTES
"Encounter Date: 8/22/2017    SCRIBE #1 NOTE: I, Jasmin Mckeon, am scribing for, and in the presence of,  Dr. Lara. I have scribed the entire note.       History     Chief Complaint   Patient presents with    Fall     2 days ago, seen in er, my lung still hurts     Time seen by provider: 7:26 AM    This is a 51 y.o. male with history of heart murmur, HTN, anxiety,stroke, and hepatitis C who presents with complaint of acute left sided chest wall pain s/p fall x 7 days ago. Patient states he was evaluated for this with a normal chest x-ray and was discharged with naproxen. He reports that the naproxen did not relieve his pain and caused a rash to break out on his scalp so he discontinued use of this medication. He states the pain is worsening and feels like "someone stepping on my lung".       Review of patient's allergies indicates:   Allergen Reactions    Aspirin Shortness Of Breath    Codeine Shortness Of Breath    Penicillins Shortness Of Breath and Rash    Toradol [ketorolac] Anaphylaxis    Shellfish containing products Nausea And Vomiting    Sulfa (sulfonamide antibiotics)      Past Medical History:   Diagnosis Date    Anxiety     Depression     Heart murmur     Hepatitis C     History of psychiatric hospitalization     5 previous hospitalizations.  Last was in 2015 at [facility]    HTN (hypertension) 11/6/2014    Hx of psychiatric care     Celexa, Zoloft, Seroquel     Psychiatric problem     depression and anxiety    Stroke     right side weakness    Therapy     reports last saw psychiatrist last year and he does not remember the name     Past Surgical History:   Procedure Laterality Date    APPENDECTOMY      FOOT SURGERY       Family History   Problem Relation Age of Onset    Drug abuse Mother     Heart disease Mother     Alcohol abuse Father     Heart disease Father      Social History   Substance Use Topics    Smoking status: Current Every Day Smoker     Packs/day: 1.00     Years: 43.00 " "    Types: Cigarettes    Smokeless tobacco: Never Used      Comment: patient ran out of [employer]    Alcohol use Yes      Comment: Occasionally, last use was 3 days ago     Review of Systems   Respiratory:        Positive for left chest wall pain.    Skin: Positive for rash (Scalp).   All other systems reviewed and are negative.      Physical Exam     Initial Vitals [08/22/17 0709]   BP Pulse Resp Temp SpO2   (!) 141/81 86 18 97.6 °F (36.4 °C) 100 %      MAP       101         Physical Exam    Nursing note and vitals reviewed.  Constitutional: He appears well-developed and well-nourished. He is not diaphoretic. No distress.   HENT:   Head: Normocephalic and atraumatic.   Mouth/Throat: Oropharynx is clear and moist and mucous membranes are normal.   Pulmonary/Chest: Breath sounds normal. No respiratory distress.   No appreciable chest wall bruising, contusion, crepitance, or deformity.    Neurological: He is alert and oriented to person, place, and time. He has normal strength. No cranial nerve deficit.   Skin: Skin is warm and dry. No pallor.   On scalp there are areas of excoriations and scabbing.          ED Course   Procedures  Labs Reviewed - No data to display  EKG Readings: (Independently Interpreted)   Normal sinus rhythm at 85. Normal axis. No significant ST changes.           Medical Decision Making:   History:   Old Medical Records: I decided to obtain old medical records.  Initial Assessment:   Evaluation of persistent pain after a fall. This is the third emergency department visit for the same complaint. He has had multiple imaging studies to evaluate his chest complaint. EKG during these times have been unchanged and this seems very inconsistent with cardiac chest pain. I have low suspicion for cardiac event causing 7 days of pain. Patient reports an "allergy" to the naproxen as evidenced by the "rash" on his scalp, though this seems more consistent with folliculitis and intentional scratching and not " consistent with a drug reaction. At this time he denies an allergy to Toradol. I reviewed the patient's chest imaging with him and he point out the abnormality that he needs antibiotics for which was his cardiac silhouette. I do not feel the patient needs antibiotics and I feel given his multiple prescriptions for narcotics it would be inappropriate and unsafe for me to provide additional narcotics. He states that he can take ibuprofen and I recommended a short course of this medication. Although this is available over the counter he requested a prescription for this medication.   Independently Interpreted Test(s):   I have ordered and independently interpreted EKG Reading(s) - see prior notes  Clinical Tests:   Medical Tests: Ordered and Reviewed            Scribe Attestation:   Scribe #1: I performed the above scribed service and the documentation accurately describes the services I performed. I attest to the accuracy of the note.    Attending Attestation:           Physician Attestation for Scribe:  Physician Attestation Statement for Scribe #1: I, Dr. Lara, reviewed documentation, as scribed by Jasmin Mckeon in my presence, and it is both accurate and complete.                 ED Course     Clinical Impression:   The primary encounter diagnosis was Left-sided chest wall pain. Diagnoses of Other chronic pain and Chest pain were also pertinent to this visit.    Disposition:   Disposition: Discharged  Condition: Stable                        Rebeka Lara MD  08/22/17 7902

## 2017-09-26 ENCOUNTER — HOSPITAL ENCOUNTER (EMERGENCY)
Facility: OTHER | Age: 52
Discharge: HOME | End: 2017-09-26
Attending: EMERGENCY MEDICINE
Payer: MEDICAID

## 2017-09-26 VITALS
HEART RATE: 91 BPM | HEIGHT: 63 IN | OXYGEN SATURATION: 96 % | SYSTOLIC BLOOD PRESSURE: 164 MMHG | RESPIRATION RATE: 18 BRPM | WEIGHT: 168 LBS | DIASTOLIC BLOOD PRESSURE: 92 MMHG | TEMPERATURE: 100 F | BODY MASS INDEX: 29.77 KG/M2

## 2017-09-26 DIAGNOSIS — K02.9 DENTAL CARIES INTO PULP: Primary | ICD-10-CM

## 2017-09-26 PROCEDURE — 99283 EMERGENCY DEPT VISIT LOW MDM: CPT

## 2017-09-26 RX ORDER — CLINDAMYCIN HYDROCHLORIDE 150 MG/1
150 CAPSULE ORAL 4 TIMES DAILY
Qty: 28 CAPSULE | Refills: 0 | Status: SHIPPED | OUTPATIENT
Start: 2017-09-26 | End: 2017-10-03

## 2017-09-26 NOTE — ED PROVIDER NOTES
Encounter Date: 9/26/2017       History     Chief Complaint   Patient presents with    Dental Pain     patient reports havint right upper side dental pain X2 days     The patient has a long standing history of odontogenic pain late at to multiple dental caries.      The history is provided by the patient.   Dental Pain   The primary symptoms include mouth pain. The symptoms began several weeks ago. The symptoms are waxing and waning. The symptoms are chronic. The symptoms occur constantly.   Mouth pain began more than 1 month ago. Mouth pain occurs constantly. Affected locations include: teeth. At its highest the mouth pain was at 5/10. The mouth pain is currently at 5/10.   Additional symptoms include: dental sensitivity to temperature, gum swelling and gum tenderness. Additional symptoms do not include: purulent gums, trismus, jaw pain, facial swelling, trouble swallowing, pain with swallowing, excessive salivation, dry mouth, taste disturbance, smell disturbance, drooling, ear pain, hearing loss, nosebleeds, swollen glands, goiter and fatigue. Medical issues include: periodontal disease.     Review of patient's allergies indicates:   Allergen Reactions    Aspirin Shortness Of Breath    Codeine Shortness Of Breath    Penicillins Shortness Of Breath and Rash    Toradol [ketorolac] Anaphylaxis    Shellfish containing products Nausea And Vomiting    Sulfa (sulfonamide antibiotics)     Naproxen Rash     Past Medical History:   Diagnosis Date    Anxiety     Depression     Heart murmur     Hepatitis C     History of psychiatric hospitalization     5 previous hospitalizations.  Last was in 2015 at [facility]    HTN (hypertension) 11/6/2014    Hx of psychiatric care     Celexa, Zoloft, Seroquel     Psychiatric problem     depression and anxiety    Stroke     right side weakness    Therapy     reports last saw psychiatrist last year and he does not remember the name     Past Surgical History:   Procedure  Laterality Date    APPENDECTOMY      FOOT SURGERY       Family History   Problem Relation Age of Onset    Drug abuse Mother     Heart disease Mother     Alcohol abuse Father     Heart disease Father      Social History   Substance Use Topics    Smoking status: Current Every Day Smoker     Packs/day: 1.00     Years: 43.00     Types: Cigarettes    Smokeless tobacco: Never Used      Comment: patient ran out of [employer]    Alcohol use Yes      Comment: Occasionally, last use was 3 days ago     Review of Systems   Constitutional: Negative.  Negative for fatigue.   HENT: Negative.  Negative for drooling, ear pain, facial swelling, hearing loss, nosebleeds and trouble swallowing.    Eyes: Negative.    Respiratory: Negative.    Cardiovascular: Negative.    Gastrointestinal: Negative.    Endocrine: Negative.    Genitourinary: Negative.    Musculoskeletal: Negative.    Skin: Negative.    Allergic/Immunologic: Negative.    Neurological: Negative.    Hematological: Negative.    Psychiatric/Behavioral: Negative.    All other systems reviewed and are negative.      Physical Exam     Initial Vitals [09/26/17 1207]   BP Pulse Resp Temp SpO2   (!) 164/92 91 18 99.7 °F (37.6 °C) 96 %      MAP       116         Physical Exam    Nursing note and vitals reviewed.  Constitutional: Vital signs are normal. He appears well-developed. He is active and cooperative.   HENT:   Head: Normocephalic and atraumatic.   Mouth/Throat: Oropharynx is clear and moist and mucous membranes are normal. No trismus in the jaw. Dental caries (essentially all remaining teeth are fractured and carious) present. No dental abscesses.   Eyes: Conjunctivae, EOM and lids are normal. Pupils are equal, round, and reactive to light.   Neck: Trachea normal and full passive range of motion without pain. Neck supple. No thyroid mass present.   Cardiovascular: Normal rate, regular rhythm, S1 normal, S2 normal, normal heart sounds, intact distal pulses and normal  pulses.   Abdominal: Soft. Normal appearance, normal aorta and bowel sounds are normal.   Musculoskeletal: Normal range of motion.   Lymphadenopathy:     He has no axillary adenopathy.   Neurological: He is alert and oriented to person, place, and time.   Skin: Skin is warm, dry and intact.   Psychiatric: He has a normal mood and affect. His speech is normal and behavior is normal. Judgment and thought content normal. Cognition and memory are normal.         ED Course   Procedures  Labs Reviewed - No data to display                            ED Course      Clinical Impression:   The encounter diagnosis was Dental caries into pulp.                           Chau Carroll MD  09/26/17 8508

## 2018-04-04 ENCOUNTER — HOSPITAL ENCOUNTER (INPATIENT)
Facility: HOSPITAL | Age: 53
LOS: 7 days | Discharge: NURSING, REHAB OR LONG-TERM CARE | DRG: 897 | End: 2018-04-11
Attending: PSYCHIATRY & NEUROLOGY | Admitting: PSYCHIATRY & NEUROLOGY
Payer: COMMERCIAL

## 2018-04-04 DIAGNOSIS — F12.90 MARIJUANA USE, CONTINUOUS: ICD-10-CM

## 2018-04-04 DIAGNOSIS — I10 ESSENTIAL HYPERTENSION: ICD-10-CM

## 2018-04-04 DIAGNOSIS — Z86.73 HISTORY OF CEREBRAL INFARCTION: Chronic | ICD-10-CM

## 2018-04-04 DIAGNOSIS — F19.94 SUBSTANCE INDUCED MOOD DISORDER: ICD-10-CM

## 2018-04-04 DIAGNOSIS — F14.20 COCAINE USE DISORDER, SEVERE, DEPENDENCE: ICD-10-CM

## 2018-04-04 DIAGNOSIS — F10.90 ALCOHOL USE DISORDER: ICD-10-CM

## 2018-04-04 PROCEDURE — 12400001 HC PSYCH SEMI-PRIVATE ROOM

## 2018-04-04 PROCEDURE — 25000003 PHARM REV CODE 250: Performed by: PSYCHIATRY & NEUROLOGY

## 2018-04-04 RX ORDER — LORAZEPAM 1 MG/1
2 TABLET ORAL EVERY 4 HOURS PRN
Status: DISCONTINUED | OUTPATIENT
Start: 2018-04-04 | End: 2018-04-11 | Stop reason: HOSPADM

## 2018-04-04 RX ORDER — MIRTAZAPINE 15 MG/1
15 TABLET, ORALLY DISINTEGRATING ORAL NIGHTLY
Status: DISCONTINUED | OUTPATIENT
Start: 2018-04-04 | End: 2018-04-05

## 2018-04-04 RX ORDER — DIPHENHYDRAMINE HYDROCHLORIDE 50 MG/ML
50 INJECTION INTRAMUSCULAR; INTRAVENOUS EVERY 6 HOURS PRN
Status: DISCONTINUED | OUTPATIENT
Start: 2018-04-04 | End: 2018-04-11 | Stop reason: HOSPADM

## 2018-04-04 RX ORDER — DOCUSATE SODIUM 100 MG/1
100 CAPSULE, LIQUID FILLED ORAL DAILY PRN
Status: DISCONTINUED | OUTPATIENT
Start: 2018-04-04 | End: 2018-04-11 | Stop reason: HOSPADM

## 2018-04-04 RX ORDER — FOLIC ACID 1 MG/1
1 TABLET ORAL DAILY
Status: DISCONTINUED | OUTPATIENT
Start: 2018-04-05 | End: 2018-04-05

## 2018-04-04 RX ORDER — CLOPIDOGREL BISULFATE 75 MG/1
75 TABLET ORAL DAILY
Status: DISCONTINUED | OUTPATIENT
Start: 2018-04-05 | End: 2018-04-11 | Stop reason: HOSPADM

## 2018-04-04 RX ORDER — LOPERAMIDE HYDROCHLORIDE 2 MG/1
2 CAPSULE ORAL
Status: DISCONTINUED | OUTPATIENT
Start: 2018-04-04 | End: 2018-04-11 | Stop reason: HOSPADM

## 2018-04-04 RX ORDER — HALOPERIDOL 5 MG/ML
5 INJECTION INTRAMUSCULAR EVERY 6 HOURS PRN
Status: DISCONTINUED | OUTPATIENT
Start: 2018-04-04 | End: 2018-04-11 | Stop reason: HOSPADM

## 2018-04-04 RX ORDER — HALOPERIDOL 5 MG/1
5 TABLET ORAL EVERY 4 HOURS PRN
Status: DISCONTINUED | OUTPATIENT
Start: 2018-04-04 | End: 2018-04-11 | Stop reason: HOSPADM

## 2018-04-04 RX ORDER — LISINOPRIL 20 MG/1
40 TABLET ORAL DAILY
Status: DISCONTINUED | OUTPATIENT
Start: 2018-04-05 | End: 2018-04-11 | Stop reason: HOSPADM

## 2018-04-04 RX ORDER — NICOTINE 7MG/24HR
1 PATCH, TRANSDERMAL 24 HOURS TRANSDERMAL DAILY
Status: DISCONTINUED | OUTPATIENT
Start: 2018-04-05 | End: 2018-04-11 | Stop reason: HOSPADM

## 2018-04-04 RX ORDER — ROSUVASTATIN CALCIUM 40 MG/1
40 TABLET, COATED ORAL NIGHTLY
Status: DISCONTINUED | OUTPATIENT
Start: 2018-04-04 | End: 2018-04-11 | Stop reason: HOSPADM

## 2018-04-04 RX ORDER — ACETAMINOPHEN 325 MG/1
650 TABLET ORAL EVERY 6 HOURS PRN
Status: DISCONTINUED | OUTPATIENT
Start: 2018-04-04 | End: 2018-04-11 | Stop reason: HOSPADM

## 2018-04-04 RX ORDER — DIPHENHYDRAMINE HCL 50 MG
50 CAPSULE ORAL EVERY 4 HOURS PRN
Status: DISCONTINUED | OUTPATIENT
Start: 2018-04-04 | End: 2018-04-11 | Stop reason: HOSPADM

## 2018-04-04 RX ORDER — THIAMINE HCL 100 MG
100 TABLET ORAL DAILY
Status: DISCONTINUED | OUTPATIENT
Start: 2018-04-05 | End: 2018-04-10

## 2018-04-04 RX ADMIN — ROSUVASTATIN CALCIUM 40 MG: 40 TABLET ORAL at 11:04

## 2018-04-04 RX ADMIN — MIRTAZAPINE 15 MG: 15 TABLET, ORALLY DISINTEGRATING ORAL at 11:04
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
## 2018-04-04 NOTE — ASSESSMENT & PLAN NOTE
1. Dispo/Legal Status: Admit to [facility] - do not transfer please. Cont PEC at this time as the pt is currently a danger to self. Seek inpt bed for pt safety and stabilization when/if medically cleared by the ER MD.  2. Scheduled Medications:              Remeron 15mg qHS  3. Home Medications:              Plavix 75mg daily              Lisinopril 40mg daily              Crestor 40mg daily  4. PRN Medications: Haldol/Ativan/Benadryl prn non-redirectable agitation associated with breakthrough psychosis or sheng if needed to help the pt more effectively interact with environment. Ativan withdrawal medications.  5. Precautions/Nursing:  Suicide and withdrawal - vitals q4hr  6. Case Discussed with: Dr. Patterson

## 2018-04-05 PROBLEM — F10.90 ALCOHOL USE DISORDER: Status: ACTIVE | Noted: 2018-04-05

## 2018-04-05 LAB
FOLATE SERPL-MCNC: >40 NG/ML
T3FREE SERPL-MCNC: 2.9 PG/ML
T4 FREE SERPL-MCNC: 1.2 NG/DL
VIT B12 SERPL-MCNC: 428 PG/ML

## 2018-04-05 PROCEDURE — 84481 FREE ASSAY (FT-3): CPT

## 2018-04-05 PROCEDURE — 25000003 PHARM REV CODE 250: Performed by: PSYCHIATRY & NEUROLOGY

## 2018-04-05 PROCEDURE — S4991 NICOTINE PATCH NONLEGEND: HCPCS | Performed by: PSYCHIATRY & NEUROLOGY

## 2018-04-05 PROCEDURE — 36415 COLL VENOUS BLD VENIPUNCTURE: CPT

## 2018-04-05 PROCEDURE — 99223 1ST HOSP IP/OBS HIGH 75: CPT | Mod: ,,, | Performed by: PSYCHIATRY & NEUROLOGY

## 2018-04-05 PROCEDURE — 82746 ASSAY OF FOLIC ACID SERUM: CPT

## 2018-04-05 PROCEDURE — 12400001 HC PSYCH SEMI-PRIVATE ROOM

## 2018-04-05 PROCEDURE — 82607 VITAMIN B-12: CPT

## 2018-04-05 PROCEDURE — 86592 SYPHILIS TEST NON-TREP QUAL: CPT

## 2018-04-05 PROCEDURE — 84439 ASSAY OF FREE THYROXINE: CPT

## 2018-04-05 RX ORDER — MIRTAZAPINE 15 MG/1
30 TABLET, ORALLY DISINTEGRATING ORAL NIGHTLY
Status: DISCONTINUED | OUTPATIENT
Start: 2018-04-05 | End: 2018-04-11 | Stop reason: HOSPADM

## 2018-04-05 RX ORDER — ESCITALOPRAM OXALATE 10 MG/1
10 TABLET ORAL DAILY
Status: DISCONTINUED | OUTPATIENT
Start: 2018-04-05 | End: 2018-04-11 | Stop reason: HOSPADM

## 2018-04-05 RX ORDER — DIAZEPAM 5 MG/1
10 TABLET ORAL 3 TIMES DAILY
Status: DISCONTINUED | OUTPATIENT
Start: 2018-04-05 | End: 2018-04-08

## 2018-04-05 RX ADMIN — LISINOPRIL 40 MG: 20 TABLET ORAL at 08:04

## 2018-04-05 RX ADMIN — DIAZEPAM 10 MG: 5 TABLET ORAL at 04:04

## 2018-04-05 RX ADMIN — ESCITALOPRAM OXALATE 10 MG: 10 TABLET ORAL at 12:04

## 2018-04-05 RX ADMIN — MIRTAZAPINE 30 MG: 15 TABLET, ORALLY DISINTEGRATING ORAL at 08:04

## 2018-04-05 RX ADMIN — THERA TABS 1 TABLET: TAB at 08:04

## 2018-04-05 RX ADMIN — NICOTINE 1 PATCH: 7 PATCH, EXTENDED RELEASE TRANSDERMAL at 08:04

## 2018-04-05 RX ADMIN — Medication 100 MG: at 08:04

## 2018-04-05 RX ADMIN — Medication 1 TABLET: at 12:04

## 2018-04-05 RX ADMIN — CLOPIDOGREL 75 MG: 75 TABLET, FILM COATED ORAL at 08:04

## 2018-04-05 RX ADMIN — DIAZEPAM 10 MG: 5 TABLET ORAL at 12:04

## 2018-04-05 RX ADMIN — FOLIC ACID 1 MG: 1 TABLET ORAL at 08:04

## 2018-04-05 RX ADMIN — ROSUVASTATIN CALCIUM 40 MG: 40 TABLET ORAL at 08:04

## 2018-04-05 RX ADMIN — DIAZEPAM 10 MG: 5 TABLET ORAL at 08:04

## 2018-04-05 NOTE — PLAN OF CARE
Patient alert and oriented. Patient ambulatory with walker. Patient calm and cooperative. Patient compliant with medication regime. Patient denies any auditory or visual hallucinations. Patient denies any complaint of discomfort or distress.

## 2018-04-05 NOTE — HOSPITAL COURSE
"4/5/2018: Pt reports he has been having trouble dealing with his wife who is bipolar schizophrenic and off her meds which has caused him to resume illicit drug use. Pt reports he has been feeling depressed and having trouble seeping. Reports that he almost killed himself prior to coming to the hospital. No other complaints at this time. Denies SI/HI/AVH currently.    4/6/2018: Feeling "a lot better." Still has a little bit of a shake from the alcohol withdrawal. Patient is on board with the plan to go directly to a residential rehab; encouraged him to make calls to do intake interviews for rehab. Denies SI/HI/AH/VH. Endorses constipation and some congestion.   Per RN: Walking around with walker much better. Not oversedated.    4/8/2018: Pt reports he is feeling a little bit better today. Reports he still has been shaking and sweating but says this is good cause he is getting all the poison out his body. Pt denies SI/HI/AVH but says that he has been having dreams where he jumps off the bridge. He says he does not want to do this. Pt not sleeping well. He says he has been approved for rehab and can leave Wednesday. Rehab in [place].     4/9/2018: Pt reports he is still feeling "a little woozy" this morning. Pt reports again that he has been accepted to [facility] in [place] and is still planning to go here Wednesday. Denies side effects from meds. Denies depression currently and says he is actually feeling excited. He denies Si, paranoia. Says he thinks the street drugs are leaving him because he has been sweating them out. Reports sleep is not great and says he has been waking up every couple hours at night. Reports appetite is good. Endorsing constipation but otherwise has no complaints.     4/10/2018:pt reports he is feeling good today. He is excited that he is more steady on his feet today. Pt reports he is looking forward to going to [facility] in [place] tomorrow for rehab. He is eating, sleeping well. " "Denies medication side effects. Denies any Ssx of withdrawal. Denies SI/HI/aVH.     4/11/2018: Pt reports he is feeling good this morning. He is looking forward to going to rehab in [place] today. Reports mood is "happy." Slept well last night,. Appetite good. No medical complaints. Pt would like to go to care home house after rehab. Denies medication side effects. Denies SI/HI/AVH.  "

## 2018-04-05 NOTE — NURSING
"52 y.o male transferred from [facility] with a dx of SI. According to documentation, the pt. presents with a  2 month hx of worsening suicidal ideation, command A/V hallucinations in the setting of ongoing IV drug use and Etoh abuse. Pt. presently denies wanting to harm himself and has contracted for safety. Denied HI, A/V hallucinations and stated, " all I want to do is sleep". Pt. searched, no contraband found. Took scheduled medications and presently resting quietly. All paperwork signed, oriented to unit policies and procedures, with personal belongings secured in the locker. Free from falls/injury. Safety maintained. Continue to monitor.  "

## 2018-04-05 NOTE — SUBJECTIVE & OBJECTIVE
"Interval History: hospital course      Family History     Problem Relation (Age of Onset)    Alcohol abuse Father    Drug abuse Mother    Heart disease Mother, Father        Social History Main Topics    Smoking status: Current Every Day Smoker     Packs/day: 1.00     Years: 43.00     Types: Cigarettes    Smokeless tobacco: Never Used      Comment: patient ran out of [employer]    Alcohol use Yes      Comment: Occasionally, last use was 3 days ago    Drug use: Yes     Types: "Crack" cocaine, Marijuana      Comment: using both daily, $100 of crack daily    Sexual activity: Yes     Partners: Female      Comment:      Psychotherapeutics     Start     Stop Route Frequency Ordered    04/04/18 2130  mirtazapine disintegrating tablet 15 mg      -- Oral Nightly 04/04/18 2127 04/04/18 2127  haloperidol tablet 5 mg  (Med - Acute  Behavioral Management)      -- Oral Every 4 hours PRN 04/04/18 2127 04/04/18 2127  LORazepam tablet 2 mg  (Med - Acute  Behavioral Management)      -- Oral Every 4 hours PRN 04/04/18 2127 04/04/18 2127  haloperidol lactate injection 5 mg      -- IM Every 6 hours PRN 04/04/18 2127 04/04/18 2127  lorazepam (ATIVAN) injection 2 mg      -- IM Every 6 hours PRN 04/04/18 2127 04/04/18 2127  LORazepam tablet 2 mg      -- Oral Every 4 hours PRN 04/04/18 2127           Review of Systems   Constitutional: Positive for fever.   Psychiatric/Behavioral: Positive for decreased concentration, dysphoric mood, sleep disturbance and suicidal ideas. Negative for agitation, behavioral problems, confusion, hallucinations and self-injury. The patient is nervous/anxious. The patient is not hyperactive.      Objective:     Vital Signs (Most Recent):  Temp: 99.3 °F (37.4 °C) (04/05/18 0754)  Pulse: 85 (04/05/18 0754)  Resp: 18 (04/05/18 0754)  BP: (!) 144/84 (04/05/18 0754) Vital Signs (24h Range):  Temp:  [98.3 °F (36.8 °C)-99.3 °F (37.4 °C)] 99.3 °F (37.4 °C)  Pulse:  [66-90] 85  Resp:  [16-18] " "18  BP: (124-146)/(73-91) 144/84     Height: 5' 4" (162.6 cm)  Weight: 64.6 kg (142 lb 6.7 oz)  Body mass index is 24.45 kg/m².    No intake or output data in the 24 hours ending 04/05/18 1138    Physical Exam   Psychiatric:   Mental Status Exam:  Appearance: unremarkable, older than stated age  Behavior/Cooperation: limited/ appropriate normal, cooperative  Speech: appropriate rate, volume and tone normal tone, normal rate, normal pitch, normal volume  Language: uses words appropriately; NO aphasia or dysarthria  Mood: depressed  Affect:  congruent with mood and appropriate to situation/content   Thought Process: normal and logical  Thought Content: normal, no homicidality, delusions, or paranoia, Passive SI  Level of Consciousness: Alert and Oriented x3  Memory:  Intact  Attention/concentration: appropriate for age/education.   Fund of Knowledge: appears adequate  Insight:   Intact  Judgment:  Intact          Significant Labs:   Last 24 Hours:   Recent Lab Results       04/04/18  1207 04/04/18  1206      Benzodiazepines  Negative     Methadone metabolites  Negative     Phencyclidine  Negative     Amphetamine Screen, Ur  Negative     Appearance, UA Clear      Barbiturate Screen, Ur  Negative     Bilirubin (UA) Negative      Cocaine (Metab.)  Presumptive Positive     Color, UA Yellow      Creatinine, Random Ur  200.0  Comment:  The random urine reference ranges provided were established   for 24 hour urine collections.  No reference ranges exist for  random urine specimens.  Correlate clinically.       Glucose, UA Negative      Ketones, UA Negative      Leukocytes, UA Negative      Nitrite, UA Negative      Occult Blood UA Negative      Opiate Scrn, Ur  Negative     pH, UA 5.0      Protein, UA Negative  Comment:  Recommend a 24 hour urine protein or a urine   protein/creatinine ratio if globulin induced proteinuria is  clinically suspected.        Specific Gravity, UA 1.020      Specimen UA Urine, Clean Catch      " Marijuana (THC) Metabolite  Presumptive Positive     Toxicology Information  SEE COMMENT  Comment:  This screen includes the following classes of drugs at the   listed cut-off:  Benzodiazepines                  200 ng/ml  Methadone                        300 ng/ml  Cocaine metabolite               300 ng/ml  Opiates                          300 ng/ml  Barbiturates                     200 ng/ml  Amphetamines                    1000 ng/ml  Marijuana metabs (THC)            50 ng/ml  Phencyclidine (PCP)               25 ng/ml  High concentrations of Diphenhydramine may cross-react with  Phencyclidine PCP screening immunoassay giving a false   positive result.  High concentrations of Methylenedioxymethamphetamine (MDMA aka  Ectasy) and other structurally similar compounds may cross-   react with the Amphetamine/Methamphetamine screening   immunoassay giving a false positive result.  A metabolite of the anti-HIV drug Sustiva () may cause  false positive results in the Marijuana metabolite (THC)   screening assay.  Note: This exception list includes only more common   interferants in toxicology screen testing.  Because of many   cross-reactantspositive results on toxicology drug screens   should be confirmed whenever results do not correlate with   clinical presentation.  This report is intended for use in clinical monitoring and  management of patients. It is not intended for use in   employment related drug testing.  Because of any cross-reactants, positive results on toxicology  drug screens should be confirmed whenever results do not  correlate with clinical presentation.  Presumptive positive results are unconfirmed and may be used   only for medical purposes.       Urobilinogen, UA 4.0            Significant Imaging: I have reviewed all pertinent imaging results/findings within the past 24 hours.

## 2018-04-05 NOTE — PLAN OF CARE
04/05/18 1400   [facility] Group Therapy   Group Name Anger Management   Specific Interventions Coping Skills Training   Participation Level None   Participation Quality Refused
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
## 2018-04-05 NOTE — PROGRESS NOTES
TIM spoke with pt about residential treatment for substance abuse use.  [facility]  SW told him will submit a referral for an available bed opening. Will assist pt in treatment as requested.

## 2018-04-06 LAB — RPR SER QL: NORMAL

## 2018-04-06 PROCEDURE — 25000003 PHARM REV CODE 250: Performed by: PSYCHIATRY & NEUROLOGY

## 2018-04-06 PROCEDURE — 12400001 HC PSYCH SEMI-PRIVATE ROOM

## 2018-04-06 PROCEDURE — 90853 GROUP PSYCHOTHERAPY: CPT | Mod: ,,, | Performed by: PSYCHOLOGIST

## 2018-04-06 PROCEDURE — S4991 NICOTINE PATCH NONLEGEND: HCPCS | Performed by: PSYCHIATRY & NEUROLOGY

## 2018-04-06 PROCEDURE — 99232 SBSQ HOSP IP/OBS MODERATE 35: CPT | Mod: ,,, | Performed by: PSYCHIATRY & NEUROLOGY

## 2018-04-06 RX ORDER — ADHESIVE BANDAGE
30 BANDAGE TOPICAL DAILY PRN
Status: DISCONTINUED | OUTPATIENT
Start: 2018-04-06 | End: 2018-04-11 | Stop reason: HOSPADM

## 2018-04-06 RX ADMIN — ESCITALOPRAM OXALATE 10 MG: 10 TABLET ORAL at 09:04

## 2018-04-06 RX ADMIN — Medication 1 TABLET: at 09:04

## 2018-04-06 RX ADMIN — LISINOPRIL 40 MG: 20 TABLET ORAL at 09:04

## 2018-04-06 RX ADMIN — DIAZEPAM 10 MG: 5 TABLET ORAL at 09:04

## 2018-04-06 RX ADMIN — THERA TABS 1 TABLET: TAB at 09:04

## 2018-04-06 RX ADMIN — MIRTAZAPINE 30 MG: 15 TABLET, ORALLY DISINTEGRATING ORAL at 08:04

## 2018-04-06 RX ADMIN — NICOTINE 1 PATCH: 7 PATCH, EXTENDED RELEASE TRANSDERMAL at 09:04

## 2018-04-06 RX ADMIN — CLOPIDOGREL 75 MG: 75 TABLET, FILM COATED ORAL at 09:04

## 2018-04-06 RX ADMIN — DIAZEPAM 10 MG: 5 TABLET ORAL at 03:04

## 2018-04-06 RX ADMIN — Medication 100 MG: at 09:04

## 2018-04-06 RX ADMIN — ROSUVASTATIN CALCIUM 40 MG: 40 TABLET ORAL at 08:04

## 2018-04-06 RX ADMIN — DIAZEPAM 10 MG: 5 TABLET ORAL at 08:04

## 2018-04-06 NOTE — PROGRESS NOTES
Referral submitted to [facility] [place] for availability male bed.  Return reply from admission that a bed will be available on April 11, 2018. If pt accept the program he will be there early on next Wednesday  Before 3:00 pm.  SW will assist as needed.

## 2018-04-06 NOTE — PLAN OF CARE
Patient alert and oriented. Patient ambulatory with walker. Patient calm and cooperative. Patient denies any auditory or visual hallucinations. Patient compliant with medication regime. Patient interacts with peers and staff. Will continue to monitor.

## 2018-04-06 NOTE — PROGRESS NOTES
Group Psychotherapy (PhD/LCSW)    Site: [facility]    Clinical status of patient: Inpatient    Date: 4/6/2018    Group Focus: Life Skills    Length of service: 01568 - 35-40 minutes    Number of patients in attendance: 9    Referred by: Acute Psychiatry Unit Treatment Team    Target symptoms: Alcohol Abuse, Substance Abuse and Mood Disorder    Patient's response to treatment: Active Listening    Progress toward goals: Progressing slowly    Interval History:  Pt appeared alert and attentive. Pt participated very briefly when prompted in a group discussion of how to negotiate difficulties in friendships. Discussion focused on when it is better to talk through the difficulties and and when it is better to let them go. Also illustrated how to use I-messages in such circumstances.    Diagnosis: Substance Induced Mood d/o; cocaine dependence; alcohol abuse; cannabis abuse     Plan: Continue treatment on [facility]
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
## 2018-04-06 NOTE — SUBJECTIVE & OBJECTIVE
"Interval History: hospital course      Family History     Problem Relation (Age of Onset)    Alcohol abuse Father    Drug abuse Mother    Heart disease Mother, Father        Social History Main Topics    Smoking status: Current Every Day Smoker     Packs/day: 1.00     Years: 43.00     Types: Cigarettes    Smokeless tobacco: Never Used      Comment: patient ran out of Bit Cauldrontix    Alcohol use Yes      Comment: Occasionally, last use was 3 days ago    Drug use: Yes     Types: "Crack" cocaine, Marijuana      Comment: using both daily, $100 of crack daily    Sexual activity: Yes     Partners: Female      Comment:      Psychotherapeutics     Start     Stop Route Frequency Ordered    04/05/18 2100  mirtazapine disintegrating tablet 30 mg      -- Oral Nightly 04/05/18 1143    04/05/18 1245  escitalopram oxalate tablet 10 mg      -- Oral Daily 04/05/18 1143    04/05/18 1215  diazePAM tablet 10 mg      -- Oral 3 times daily 04/05/18 1143 04/04/18 2127  haloperidol tablet 5 mg  (Med - Acute  Behavioral Management)      -- Oral Every 4 hours PRN 04/04/18 2127 04/04/18 2127  LORazepam tablet 2 mg  (Med - Acute  Behavioral Management)      -- Oral Every 4 hours PRN 04/04/18 2127    04/04/18 2127  haloperidol lactate injection 5 mg      -- IM Every 6 hours PRN 04/04/18 2127    04/04/18 2127  lorazepam (ATIVAN) injection 2 mg      -- IM Every 6 hours PRN 04/04/18 2127 04/04/18 2127  LORazepam tablet 2 mg      -- Oral Every 4 hours PRN 04/04/18 2127           Review of Systems   Psychiatric/Behavioral: Positive for dysphoric mood. Negative for agitation, behavioral problems, confusion, hallucinations and self-injury. The patient is nervous/anxious. The patient is not hyperactive.      Objective:     Vital Signs (Most Recent):  Temp: 97.7 °F (36.5 °C) (04/06/18 0800)  Pulse: 75 (04/06/18 0800)  Resp: 20 (04/06/18 0800)  BP: (!) 146/85 (04/06/18 0800) Vital Signs (24h Range):  Temp:  [97.7 °F (36.5 °C)-98.7 °F " "(37.1 °C)] 97.7 °F (36.5 °C)  Pulse:  [69-93] 75  Resp:  [16-20] 20  BP: (122-146)/(69-85) 146/85     Height: 5' 4" (162.6 cm)  Weight: 64.6 kg (142 lb 6.7 oz)  Body mass index is 24.45 kg/m².    No intake or output data in the 24 hours ending 04/06/18 1017    Physical Exam   Psychiatric:   Mental Status Exam:  Appearance: unremarkable, older than stated age  Behavior/Cooperation: limited/ appropriate normal, cooperative  Speech: appropriate rate, volume and tone normal tone, normal rate, normal pitch, normal volume  Language: uses words appropriately; NO aphasia or dysarthria  Mood: depressed but improving  Affect:  congruent with mood and appropriate to situation/content   Thought Process: normal and logical  Thought Content: denies SI/HI/AH/VH  Level of Consciousness: Alert and Oriented x3  Memory:  Intact  Attention/concentration: appropriate for age/education.   Fund of Knowledge: appears adequate  Insight:   Intact  Judgment:  Intact            Significant Labs:   Last 24 Hours:   Recent Lab Results       04/05/18  1222      Folate >40.0(H)     Free T4 1.20     T3, Free 2.9     Vitamin B-12 428           Significant Imaging: I have reviewed all pertinent imaging results/findings within the past 24 hours.  "

## 2018-04-06 NOTE — PROGRESS NOTES
SW gave pt a list of residential treatment center to call for inpat treatment . SW will assist as needed.

## 2018-04-06 NOTE — PROGRESS NOTES
04/05/18 2000   [facility] Group Therapy   Group Name Community Reintegration   Specific Interventions Other (see comments)  (wrap up)   Participation Level Appropriate   Participation Quality Cooperative   Insight/Motivation Good   Affect/Mood Display Appropriate   Cognition Alert;Oriented
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
## 2018-04-07 PROCEDURE — 25000003 PHARM REV CODE 250: Performed by: PSYCHIATRY & NEUROLOGY

## 2018-04-07 PROCEDURE — 12400001 HC PSYCH SEMI-PRIVATE ROOM

## 2018-04-07 PROCEDURE — 99232 SBSQ HOSP IP/OBS MODERATE 35: CPT | Mod: ,,, | Performed by: INTERNAL MEDICINE

## 2018-04-07 PROCEDURE — S4991 NICOTINE PATCH NONLEGEND: HCPCS | Performed by: PSYCHIATRY & NEUROLOGY

## 2018-04-07 RX ADMIN — Medication 100 MG: at 08:04

## 2018-04-07 RX ADMIN — DIAZEPAM 10 MG: 5 TABLET ORAL at 08:04

## 2018-04-07 RX ADMIN — CLOPIDOGREL 75 MG: 75 TABLET, FILM COATED ORAL at 08:04

## 2018-04-07 RX ADMIN — THERA TABS 1 TABLET: TAB at 08:04

## 2018-04-07 RX ADMIN — NICOTINE 1 PATCH: 7 PATCH, EXTENDED RELEASE TRANSDERMAL at 08:04

## 2018-04-07 RX ADMIN — MIRTAZAPINE 30 MG: 15 TABLET, ORALLY DISINTEGRATING ORAL at 08:04

## 2018-04-07 RX ADMIN — ACETAMINOPHEN 650 MG: 325 TABLET ORAL at 09:04

## 2018-04-07 RX ADMIN — ROSUVASTATIN CALCIUM 40 MG: 40 TABLET ORAL at 08:04

## 2018-04-07 RX ADMIN — LISINOPRIL 40 MG: 20 TABLET ORAL at 08:04

## 2018-04-07 RX ADMIN — ESCITALOPRAM OXALATE 10 MG: 10 TABLET ORAL at 08:04

## 2018-04-07 RX ADMIN — Medication 1 TABLET: at 08:04

## 2018-04-07 RX ADMIN — DIAZEPAM 10 MG: 5 TABLET ORAL at 02:04

## 2018-04-07 NOTE — SUBJECTIVE & OBJECTIVE
"Interval History:     States he is feeling "pretty good" today. Reports his mood is "stable." Denies side effects to his medications. Reports that his withdrawal symptoms are controlled. Endorses good sleep last night and has a good appetite. Denies Si, Hi, AVH.          Family History     Problem Relation (Age of Onset)    Alcohol abuse Father    Drug abuse Mother    Heart disease Mother, Father        Social History Main Topics    Smoking status: Current Every Day Smoker     Packs/day: 1.00     Years: 43.00     Types: Cigarettes    Smokeless tobacco: Never Used      Comment: patient ran out of [employer]    Alcohol use Yes      Comment: Occasionally, last use was 3 days ago    Drug use: Yes     Types: "Crack" cocaine, Marijuana      Comment: using both daily, $100 of crack daily    Sexual activity: Yes     Partners: Female      Comment:      Psychotherapeutics     Start     Stop Route Frequency Ordered    04/05/18 2100  mirtazapine disintegrating tablet 30 mg      -- Oral Nightly 04/05/18 1143    04/05/18 1245  escitalopram oxalate tablet 10 mg      -- Oral Daily 04/05/18 1143    04/05/18 1215  diazePAM tablet 10 mg      -- Oral 3 times daily 04/05/18 1143    04/04/18 2127  haloperidol tablet 5 mg  (Med - Acute  Behavioral Management)      -- Oral Every 4 hours PRN 04/04/18 2127    04/04/18 2127  LORazepam tablet 2 mg  (Med - Acute  Behavioral Management)      -- Oral Every 4 hours PRN 04/04/18 2127    04/04/18 2127  haloperidol lactate injection 5 mg      -- IM Every 6 hours PRN 04/04/18 2127    04/04/18 2127  lorazepam (ATIVAN) injection 2 mg      -- IM Every 6 hours PRN 04/04/18 2127    04/04/18 2127  LORazepam tablet 2 mg      -- Oral Every 4 hours PRN 04/04/18 2127           Review of Systems     Negative except as above.    Objective:     Vital Signs (Most Recent):  Temp: 97.8 °F (36.6 °C) (04/07/18 0824)  Pulse: 76 (04/07/18 0824)  Resp: 16 (04/07/18 0824)  BP: (!) 154/77 (04/07/18 0824) Vital " "Signs (24h Range):  Temp:  [97.4 °F (36.3 °C)-97.9 °F (36.6 °C)] 97.8 °F (36.6 °C)  Pulse:  [] 76  Resp:  [16-18] 16  BP: ()/(68-78) 154/77     Height: 5' 4" (162.6 cm)  Weight: 64.6 kg (142 lb 6.7 oz)  Body mass index is 24.45 kg/m².    No intake or output data in the 24 hours ending 04/07/18 1030    Physical Exam     CONSTITUTIONAL  General Appearance: in scrubs, NAD, calm, cooperative    PSYCHIATRIC   Level of Consciousness: alert  Orientation: oriented to person, place, situation  Grooming: fair  Psychomotor Behavior: no agitation, retardation  Speech: normal rate, volume, tone  Language: English, no asphasia  Mood: "stable"  Affect: constricted  Thought Process: linear, logical  Associations: cohesive  Thought Content: denies Si, Hi  Memory: intact to recent and remote events  Attention: not distracted  Fund of Knowledge: appropriate for education level  Insight: fair  Judgment: fair    "

## 2018-04-07 NOTE — PLAN OF CARE
Problem: Patient Care Overview (Adult)  Goal: Plan of Care Review  Outcome: Ongoing (interventions implemented as appropriate)  Patient calm and cooperative. Med compliant. Denies SI/HI/AVH. Participated in unit activities and interacted with patients. Plans to attend inpatient Rehab. Will continue to monitor patient.
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
## 2018-04-08 PROCEDURE — 25000003 PHARM REV CODE 250: Performed by: PSYCHIATRY & NEUROLOGY

## 2018-04-08 PROCEDURE — 12400001 HC PSYCH SEMI-PRIVATE ROOM

## 2018-04-08 PROCEDURE — 99232 SBSQ HOSP IP/OBS MODERATE 35: CPT | Mod: ,,, | Performed by: INTERNAL MEDICINE

## 2018-04-08 PROCEDURE — S4991 NICOTINE PATCH NONLEGEND: HCPCS | Performed by: PSYCHIATRY & NEUROLOGY

## 2018-04-08 RX ORDER — DIAZEPAM 5 MG/1
10 TABLET ORAL 2 TIMES DAILY
Status: DISCONTINUED | OUTPATIENT
Start: 2018-04-08 | End: 2018-04-09

## 2018-04-08 RX ORDER — HYDROXYZINE PAMOATE 50 MG/1
50 CAPSULE ORAL NIGHTLY PRN
Status: DISCONTINUED | OUTPATIENT
Start: 2018-04-08 | End: 2018-04-11 | Stop reason: HOSPADM

## 2018-04-08 RX ADMIN — ROSUVASTATIN CALCIUM 40 MG: 40 TABLET ORAL at 08:04

## 2018-04-08 RX ADMIN — DIAZEPAM 10 MG: 5 TABLET ORAL at 08:04

## 2018-04-08 RX ADMIN — DOCUSATE SODIUM 100 MG: 100 CAPSULE, LIQUID FILLED ORAL at 12:04

## 2018-04-08 RX ADMIN — MAGNESIUM HYDROXIDE 2400 MG: 400 SUSPENSION ORAL at 12:04

## 2018-04-08 RX ADMIN — Medication 1 TABLET: at 08:04

## 2018-04-08 RX ADMIN — Medication 100 MG: at 08:04

## 2018-04-08 RX ADMIN — THERA TABS 1 TABLET: TAB at 08:04

## 2018-04-08 RX ADMIN — LISINOPRIL 40 MG: 20 TABLET ORAL at 08:04

## 2018-04-08 RX ADMIN — GUAIFENESIN AND DEXTROMETHORPHAN HYDROBROMIDE 1 TABLET: 600; 30 TABLET, EXTENDED RELEASE ORAL at 12:04

## 2018-04-08 RX ADMIN — ESCITALOPRAM OXALATE 10 MG: 10 TABLET ORAL at 08:04

## 2018-04-08 RX ADMIN — GUAIFENESIN AND DEXTROMETHORPHAN HYDROBROMIDE 1 TABLET: 600; 30 TABLET, EXTENDED RELEASE ORAL at 08:04

## 2018-04-08 RX ADMIN — MIRTAZAPINE 30 MG: 15 TABLET, ORALLY DISINTEGRATING ORAL at 08:04

## 2018-04-08 RX ADMIN — ACETAMINOPHEN 650 MG: 325 TABLET ORAL at 09:04

## 2018-04-08 RX ADMIN — CLOPIDOGREL 75 MG: 75 TABLET, FILM COATED ORAL at 08:04

## 2018-04-08 RX ADMIN — NICOTINE 1 PATCH: 7 PATCH, EXTENDED RELEASE TRANSDERMAL at 10:04
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
## 2018-04-08 NOTE — SUBJECTIVE & OBJECTIVE
"Interval History: hospital course        Family History     Problem Relation (Age of Onset)    Alcohol abuse Father    Drug abuse Mother    Heart disease Mother, Father        Social History Main Topics    Smoking status: Current Every Day Smoker     Packs/day: 1.00     Years: 43.00     Types: Cigarettes    Smokeless tobacco: Never Used      Comment: patient ran out of [employer]    Alcohol use Yes      Comment: Occasionally, last use was 3 days ago    Drug use: Yes     Types: "Crack" cocaine, Marijuana      Comment: using both daily, $100 of crack daily    Sexual activity: Yes     Partners: Female      Comment:      Psychotherapeutics     Start     Stop Route Frequency Ordered    04/05/18 2100  mirtazapine disintegrating tablet 30 mg      -- Oral Nightly 04/05/18 1143    04/05/18 1245  escitalopram oxalate tablet 10 mg      -- Oral Daily 04/05/18 1143    04/05/18 1215  diazePAM tablet 10 mg      -- Oral 3 times daily 04/05/18 1143 04/04/18 2127  haloperidol tablet 5 mg  (Med - Acute  Behavioral Management)      -- Oral Every 4 hours PRN 04/04/18 2127 04/04/18 2127  LORazepam tablet 2 mg  (Med - Acute  Behavioral Management)      -- Oral Every 4 hours PRN 04/04/18 2127 04/04/18 2127  haloperidol lactate injection 5 mg      -- IM Every 6 hours PRN 04/04/18 2127 04/04/18 2127  lorazepam (ATIVAN) injection 2 mg      -- IM Every 6 hours PRN 04/04/18 2127 04/04/18 2127  LORazepam tablet 2 mg      -- Oral Every 4 hours PRN 04/04/18 2127           Review of Systems       Negative except as above.    Objective:     Vital Signs (Most Recent):  Temp: 97.8 °F (36.6 °C) (04/08/18 0825)  Pulse: 98 (04/08/18 0825)  Resp: 16 (04/08/18 0825)  BP: (!) 152/91 (04/08/18 0825) Vital Signs (24h Range):  Temp:  [97.6 °F (36.4 °C)-97.8 °F (36.6 °C)] 97.8 °F (36.6 °C)  Pulse:  [76-98] 98  Resp:  [16] 16  BP: (113-152)/(64-91) 152/91     Height: 5' 4" (162.6 cm)  Weight: 64.6 kg (142 lb 6.7 oz)  Body mass index is " "24.45 kg/m².    No intake or output data in the 24 hours ending 04/08/18 1035    Physical Exam         CONSTITUTIONAL  General Appearance: in scrubs, NAD, calm, cooperative    PSYCHIATRIC   Level of Consciousness: alert  Orientation: oriented to person, place, situation  Grooming: fair  Psychomotor Behavior: no agitation, retardation  Speech: normal rate, volume, tone  Language: English, no asphasia  Mood: "stable"  Affect: constricted  Thought Process: linear, logical  Associations: cohesive  Thought Content: denies Si, Hi  Memory: intact to recent and remote events  Attention: not distracted  Fund of Knowledge: appropriate for education level  Insight: fair  Judgment: fair    "

## 2018-04-08 NOTE — CONSULTS
visited patient as requested by patient.   provided a compassionate presence, Restorationism literature and a Bible for patient.

## 2018-04-08 NOTE — PLAN OF CARE
Problem: Patient Care Overview (Adult)  Goal: Plan of Care Review  Outcome: Ongoing (interventions implemented as appropriate)  Patient showered and dressed. Calm and cooperative. Denies SI/HI/AVH. Interactive with peers and staff. Attended selected unit activities. Endorses his ready to go to rehab. Visited with his wife and .

## 2018-04-09 PROCEDURE — 25000003 PHARM REV CODE 250: Performed by: PSYCHIATRY & NEUROLOGY

## 2018-04-09 PROCEDURE — 90853 GROUP PSYCHOTHERAPY: CPT | Mod: ,,, | Performed by: PSYCHOLOGIST

## 2018-04-09 PROCEDURE — 12400001 HC PSYCH SEMI-PRIVATE ROOM

## 2018-04-09 PROCEDURE — 99232 SBSQ HOSP IP/OBS MODERATE 35: CPT | Mod: ,,, | Performed by: PSYCHIATRY & NEUROLOGY

## 2018-04-09 PROCEDURE — S4991 NICOTINE PATCH NONLEGEND: HCPCS | Performed by: PSYCHIATRY & NEUROLOGY

## 2018-04-09 RX ORDER — LACTULOSE 10 G/15ML
15 SOLUTION ORAL 2 TIMES DAILY
Status: DISCONTINUED | OUTPATIENT
Start: 2018-04-09 | End: 2018-04-11 | Stop reason: HOSPADM

## 2018-04-09 RX ORDER — DIAZEPAM 5 MG/1
5 TABLET ORAL 2 TIMES DAILY
Status: COMPLETED | OUTPATIENT
Start: 2018-04-09 | End: 2018-04-11

## 2018-04-09 RX ADMIN — GUAIFENESIN AND DEXTROMETHORPHAN HYDROBROMIDE 1 TABLET: 600; 30 TABLET, EXTENDED RELEASE ORAL at 09:04

## 2018-04-09 RX ADMIN — Medication 100 MG: at 09:04

## 2018-04-09 RX ADMIN — DIAZEPAM 10 MG: 5 TABLET ORAL at 09:04

## 2018-04-09 RX ADMIN — CLOPIDOGREL 75 MG: 75 TABLET, FILM COATED ORAL at 09:04

## 2018-04-09 RX ADMIN — THERA TABS 1 TABLET: TAB at 09:04

## 2018-04-09 RX ADMIN — LACTULOSE 15 G: 10 SOLUTION ORAL at 01:04

## 2018-04-09 RX ADMIN — LISINOPRIL 40 MG: 20 TABLET ORAL at 09:04

## 2018-04-09 RX ADMIN — LACTULOSE 15 G: 10 SOLUTION ORAL at 08:04

## 2018-04-09 RX ADMIN — Medication 1 TABLET: at 09:04

## 2018-04-09 RX ADMIN — LOPERAMIDE HYDROCHLORIDE 2 MG: 2 CAPSULE ORAL at 09:04

## 2018-04-09 RX ADMIN — NICOTINE 1 PATCH: 7 PATCH, EXTENDED RELEASE TRANSDERMAL at 09:04

## 2018-04-09 RX ADMIN — MIRTAZAPINE 30 MG: 15 TABLET, ORALLY DISINTEGRATING ORAL at 08:04

## 2018-04-09 RX ADMIN — DIAZEPAM 5 MG: 5 TABLET ORAL at 08:04

## 2018-04-09 RX ADMIN — ROSUVASTATIN CALCIUM 40 MG: 40 TABLET ORAL at 08:04

## 2018-04-09 RX ADMIN — ESCITALOPRAM OXALATE 10 MG: 10 TABLET ORAL at 09:04
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
## 2018-04-09 NOTE — ASSESSMENT & PLAN NOTE
- Valium 10mg TID --> BID on 4/8. Decrease to 5mg BID on 4/9. Last dose 4/11 Am.   - vitals q4h  - Ativan 2mg PO PRN withdrawal parameters
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
## 2018-04-09 NOTE — ASSESSMENT & PLAN NOTE
- Valium 10mg TID --> BID on 4/8. Decrease to 5mg BID on 4/10.  - vitals q4h  - Ativan 2mg PO PRN withdrawal parameters

## 2018-04-09 NOTE — PROGRESS NOTES
04/09/18 0900 04/09/18 1000 04/09/18 1100   [facility] Group Therapy   Group Name Community Reintegration Mental Awareness Education   Specific Interventions Current Events Cognitive Stimulation Training --    Participation Level Active;Appropriate;Attentive Active;Appropriate;Attentive --    Participation Quality Cooperative;Social Cooperative;Social Refused;Lack of Interest   Insight/Motivation Good Good --    Affect/Mood Display Appropriate Appropriate --    Cognition Alert Alert --        04/09/18 1400   [facility] Group Therapy   Group Name Therapeutic Recreation   Specific Interventions --    Participation Level --    Participation Quality Sleeping;Refused   Insight/Motivation --    Affect/Mood Display --    Cognition --

## 2018-04-09 NOTE — SUBJECTIVE & OBJECTIVE
"Interval History: hospital course        Family History     Problem Relation (Age of Onset)    Alcohol abuse Father    Drug abuse Mother    Heart disease Mother, Father        Social History Main Topics    Smoking status: Current Every Day Smoker     Packs/day: 1.00     Years: 43.00     Types: Cigarettes    Smokeless tobacco: Never Used      Comment: patient ran out of [employer]    Alcohol use Yes      Comment: Occasionally, last use was 3 days ago    Drug use: Yes     Types: "Crack" cocaine, Marijuana      Comment: using both daily, $100 of crack daily    Sexual activity: Yes     Partners: Female      Comment:      Psychotherapeutics     Start     Stop Route Frequency Ordered    04/08/18 2100  diazePAM tablet 10 mg      -- Oral 2 times daily 04/08/18 1039    04/05/18 2100  mirtazapine disintegrating tablet 30 mg      -- Oral Nightly 04/05/18 1143    04/05/18 1245  escitalopram oxalate tablet 10 mg      -- Oral Daily 04/05/18 1143 04/04/18 2127  haloperidol tablet 5 mg  (Med - Acute  Behavioral Management)      -- Oral Every 4 hours PRN 04/04/18 2127 04/04/18 2127  LORazepam tablet 2 mg  (Med - Acute  Behavioral Management)      -- Oral Every 4 hours PRN 04/04/18 2127 04/04/18 2127  haloperidol lactate injection 5 mg      -- IM Every 6 hours PRN 04/04/18 2127 04/04/18 2127  lorazepam (ATIVAN) injection 2 mg      -- IM Every 6 hours PRN 04/04/18 2127    04/04/18 2127  LORazepam tablet 2 mg      -- Oral Every 4 hours PRN 04/04/18 2127           Review of Systems       Negative except as above.    Objective:     Vital Signs (Most Recent):  Temp: 97.9 °F (36.6 °C) (04/08/18 1930)  Pulse: 84 (04/08/18 1930)  Resp: 16 (04/08/18 1930)  BP: 123/72 (04/08/18 1930) Vital Signs (24h Range):  Temp:  [97.9 °F (36.6 °C)] 97.9 °F (36.6 °C)  Pulse:  [84-98] 84  Resp:  [16] 16  BP: (116-164)/(72-85) 123/72     Height: 5' 4" (162.6 cm)  Weight: 64.6 kg (142 lb 6.7 oz)  Body mass index is 24.45 kg/m².    No " "intake or output data in the 24 hours ending 04/09/18 1042    Physical Exam         CONSTITUTIONAL  General Appearance: in scrubs, NAD, calm, cooperative    PSYCHIATRIC   Level of Consciousness: alert  Orientation: oriented to person, place, situation  Grooming: fair  Psychomotor Behavior: no agitation, retardation  Speech: normal rate, volume, tone  Language: English, no asphasia  Mood: "stable"  Affect: constricted  Thought Process: linear, logical  Associations: cohesive  Thought Content: denies Si, Hi  Memory: intact to recent and remote events  Attention: not distracted  Fund of Knowledge: appropriate for education level  Insight: fair  Judgment: fair    "

## 2018-04-09 NOTE — PROGRESS NOTES
Group Psychotherapy (PhD/LCSW)    Site: [facility]    Clinical status of patient: Inpatient    Date: 4/9/2018    Group Focus: Life Skills    Length of service: 11330 - 35-40 minutes    Number of patients in attendance: 10    Referred by: Acute Psychiatry Unit Treatment Team    Target symptoms: Alcohol Abuse, Substance Abuse and Mood Disorder    Patient's response to treatment: Active Listening    Progress toward goals: Progressing slowly    Interval History:  PPt appeared alert and attentive. Pt participated very briefly when prompted in a group discussion of how to use communication skills to ameliorate dysfunctional family dynamics.        Diagnosis: Substance Induced Mood d/o; cocaine dependence; alcohol abuse; cannabis abuse     Plan: Continue treatment on [facility]

## 2018-04-09 NOTE — PROGRESS NOTES
04/08/18 2000   [facility] Group Therapy   Group Name Stress Management   Specific Interventions Other (see comments)  (Social group wrap-up)   Participation Level Active   Participation Quality Cooperative;Social   Insight/Motivation Good   Affect/Mood Display Appropriate   Cognition Alert

## 2018-04-09 NOTE — PROGRESS NOTES
Pt is waiting to go to [facility] on  Wednesday for admission. He feels ready today to go however pt not ready as per doctor.

## 2018-04-09 NOTE — PLAN OF CARE
Patient alert and oriented. Patient ambulatory with walker. Patient calm and cooperative and compliant with medication regimen. Patient interacts with peers and staff. Patient denies any auditory or visual hallucinations. Patient also denies any complaint of discomfort or distress. Will continue to monitor.

## 2018-04-10 PROCEDURE — S4991 NICOTINE PATCH NONLEGEND: HCPCS | Performed by: PSYCHIATRY & NEUROLOGY

## 2018-04-10 PROCEDURE — 99232 SBSQ HOSP IP/OBS MODERATE 35: CPT | Mod: ,,, | Performed by: PSYCHIATRY & NEUROLOGY

## 2018-04-10 PROCEDURE — 12400001 HC PSYCH SEMI-PRIVATE ROOM

## 2018-04-10 PROCEDURE — 25000003 PHARM REV CODE 250: Performed by: PSYCHIATRY & NEUROLOGY

## 2018-04-10 PROCEDURE — 90853 GROUP PSYCHOTHERAPY: CPT | Mod: ,,, | Performed by: PSYCHOLOGIST

## 2018-04-10 RX ORDER — MIRTAZAPINE 30 MG/1
30 TABLET, ORALLY DISINTEGRATING ORAL NIGHTLY
Qty: 30 TABLET | Refills: 0 | Status: SHIPPED | OUTPATIENT
Start: 2018-04-10 | End: 2019-04-10

## 2018-04-10 RX ORDER — ESCITALOPRAM OXALATE 10 MG/1
10 TABLET ORAL DAILY
Qty: 30 TABLET | Refills: 0 | Status: SHIPPED | OUTPATIENT
Start: 2018-04-11 | End: 2018-12-11

## 2018-04-10 RX ORDER — LISINOPRIL 40 MG/1
40 TABLET ORAL DAILY
Qty: 30 TABLET | Refills: 0 | Status: SHIPPED | OUTPATIENT
Start: 2018-04-11 | End: 2019-04-11

## 2018-04-10 RX ORDER — CLOPIDOGREL BISULFATE 75 MG/1
75 TABLET ORAL DAILY
Qty: 30 TABLET | Refills: 0 | Status: SHIPPED | OUTPATIENT
Start: 2018-04-11 | End: 2019-04-11

## 2018-04-10 RX ADMIN — Medication 100 MG: at 08:04

## 2018-04-10 RX ADMIN — LISINOPRIL 40 MG: 20 TABLET ORAL at 08:04

## 2018-04-10 RX ADMIN — Medication 1 TABLET: at 08:04

## 2018-04-10 RX ADMIN — ESCITALOPRAM OXALATE 10 MG: 10 TABLET ORAL at 08:04

## 2018-04-10 RX ADMIN — MIRTAZAPINE 30 MG: 15 TABLET, ORALLY DISINTEGRATING ORAL at 10:04

## 2018-04-10 RX ADMIN — DIAZEPAM 5 MG: 5 TABLET ORAL at 08:04

## 2018-04-10 RX ADMIN — THERA TABS 1 TABLET: TAB at 08:04

## 2018-04-10 RX ADMIN — NICOTINE 1 PATCH: 7 PATCH, EXTENDED RELEASE TRANSDERMAL at 08:04

## 2018-04-10 RX ADMIN — ROSUVASTATIN CALCIUM 40 MG: 40 TABLET ORAL at 08:04

## 2018-04-10 RX ADMIN — LACTULOSE 15 G: 10 SOLUTION ORAL at 08:04

## 2018-04-10 RX ADMIN — CLOPIDOGREL 75 MG: 75 TABLET, FILM COATED ORAL at 08:04
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
## 2018-04-10 NOTE — PLAN OF CARE
04/10/18 1600   [facility] Group Therapy   Group Name Medication   Specific Interventions Relapse Prevention   Participation Level Supportive;Appropriate;Sharing   Participation Quality Cooperative   Insight/Motivation Good   Affect/Mood Display Blunted   Cognition Alert   Nursing/ group completed with  Ruba

## 2018-04-10 NOTE — PROGRESS NOTES
04/10/18 0900 04/10/18 1000 04/10/18 1100   [facility] Group Therapy   Group Name Community Reintegration Mental Awareness Education   Specific Interventions Current Events Cognitive Stimulation Training Guided Imagery/Relaxation   Participation Level Active;Appropriate;Attentive;Sharing Active;Appropriate;Attentive Active   Participation Quality Cooperative;Social Cooperative;Social Social;Cooperative   Insight/Motivation Good Good Good   Affect/Mood Display Appropriate Appropriate Appropriate   Cognition Alert;Oriented Alert;Oriented Alert;Oriented       04/10/18 1300   [facility] Group Therapy   Group Name Therapeutic Recreation   Specific Interventions Skilled Activity Crafts   Participation Level --    Participation Quality Refused;Sleeping;Lack of Interest   Insight/Motivation --    Affect/Mood Display --    Cognition --

## 2018-04-10 NOTE — PROGRESS NOTES
Group Psychotherapy (PhD/LCSW)    Site: [facility]    Clinical status of patient: Inpatient    Date: 4/10/2018    Group Focus: Life Skills    Length of service: 64968 - 35-40 minutes    Number of patients in attendance: 7    Referred by: Acute Psychiatry Unit Treatment Team    Target symptoms: Alcohol Abuse, Substance Abuse and Mood Disorder    Patient's response to treatment: Active Listening    Progress toward goals: Progressing slowly    Interval History:  Pt appeared alert and attentive. Pt participated actively when prompted in a group discussion of cultivating patience. Pt stated he felt ready for discharge and hoped that the tx team who see it the same way.        Diagnosis: Substance Induced Mood d/o; cocaine dependence; alcohol abuse; cannabis abuse     Plan: Continue treatment on [facility]

## 2018-04-10 NOTE — SUBJECTIVE & OBJECTIVE
"Interval History: hospital course        Family History     Problem Relation (Age of Onset)    Alcohol abuse Father    Drug abuse Mother    Heart disease Mother, Father        Social History Main Topics    Smoking status: Current Every Day Smoker     Packs/day: 1.00     Years: 43.00     Types: Cigarettes    Smokeless tobacco: Never Used      Comment: patient ran out of [email or web address]    Alcohol use Yes      Comment: Occasionally, last use was 3 days ago    Drug use: Yes     Types: "Crack" cocaine, Marijuana      Comment: using both daily, $100 of crack daily    Sexual activity: Yes     Partners: Female      Comment:      Psychotherapeutics     Start     Stop Route Frequency Ordered    04/09/18 2100  diazePAM tablet 5 mg      -- Oral 2 times daily 04/09/18 1044    04/05/18 2100  mirtazapine disintegrating tablet 30 mg      -- Oral Nightly 04/05/18 1143    04/05/18 1245  escitalopram oxalate tablet 10 mg      -- Oral Daily 04/05/18 1143 04/04/18 2127  haloperidol tablet 5 mg  (Med - Acute  Behavioral Management)      -- Oral Every 4 hours PRN 04/04/18 2127 04/04/18 2127  LORazepam tablet 2 mg  (Med - Acute  Behavioral Management)      -- Oral Every 4 hours PRN 04/04/18 2127    04/04/18 2127  haloperidol lactate injection 5 mg      -- IM Every 6 hours PRN 04/04/18 2127 04/04/18 2127  lorazepam (ATIVAN) injection 2 mg      -- IM Every 6 hours PRN 04/04/18 2127    04/04/18 2127  LORazepam tablet 2 mg      -- Oral Every 4 hours PRN 04/04/18 2127           Review of Systems       Negative except as above.    Objective:     Vital Signs (Most Recent):  Temp: 98 °F (36.7 °C) (04/10/18 0821)  Pulse: 98 (04/10/18 0821)  Resp: 16 (04/10/18 0821)  BP: (!) 144/83 (04/10/18 0821) Vital Signs (24h Range):  Temp:  [97.9 °F (36.6 °C)-98.3 °F (36.8 °C)] 98 °F (36.7 °C)  Pulse:  [] 98  Resp:  [16-18] 16  BP: (122-146)/(63-84) 144/83     Height: 5' 4" (162.6 cm)  Weight: 64.6 kg (142 lb 6.7 oz)  Body mass index is " "24.45 kg/m².    No intake or output data in the 24 hours ending 04/10/18 0920    Physical Exam         CONSTITUTIONAL  General Appearance: in scrubs, NAD, calm, cooperative    PSYCHIATRIC   Level of Consciousness: alert  Orientation: oriented to person, place, situation  Grooming: fair  Psychomotor Behavior: no agitation, retardation  Speech: normal rate, volume, tone  Language: English, no asphasia  Mood: "stable"  Affect: constricted  Thought Process: linear, logical  Associations: cohesive  Thought Content: denies Si, Hi  Memory: intact to recent and remote events  Attention: not distracted  Fund of Knowledge: appropriate for education level  Insight: fair  Judgment: fair    "

## 2018-04-10 NOTE — PLAN OF CARE
Problem: Patient Care Overview (Adult)  Goal: Plan of Care Review  Outcome: Ongoing (interventions implemented as appropriate)  Patient calm and cooperative. Mood good. Interactive with peers and staff. Participated in unit activities. Denies SI/HI/AVH. Denies any drug withdrawal symptoms. Vital signs stable. Looking forward to go to Rehab tomorrow.

## 2018-04-10 NOTE — PROGRESS NOTES
Pt for discharge on Wednesday at [facility] in [facility] .  [facility] transportation service will transport pt for 11:00 Wednesday. SW did call to verify pick-up time for Wednesday.
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
## 2018-04-11 VITALS
WEIGHT: 142.44 LBS | BODY MASS INDEX: 24.32 KG/M2 | SYSTOLIC BLOOD PRESSURE: 131 MMHG | RESPIRATION RATE: 16 BRPM | DIASTOLIC BLOOD PRESSURE: 81 MMHG | TEMPERATURE: 98 F | HEART RATE: 86 BPM | HEIGHT: 64 IN

## 2018-04-11 PROCEDURE — 25000003 PHARM REV CODE 250: Performed by: PSYCHIATRY & NEUROLOGY

## 2018-04-11 PROCEDURE — S4991 NICOTINE PATCH NONLEGEND: HCPCS | Performed by: PSYCHIATRY & NEUROLOGY

## 2018-04-11 PROCEDURE — 99238 HOSP IP/OBS DSCHRG MGMT 30/<: CPT | Mod: ,,, | Performed by: PSYCHIATRY & NEUROLOGY

## 2018-04-11 RX ADMIN — CLOPIDOGREL 75 MG: 75 TABLET, FILM COATED ORAL at 08:04

## 2018-04-11 RX ADMIN — NICOTINE 1 PATCH: 7 PATCH, EXTENDED RELEASE TRANSDERMAL at 08:04

## 2018-04-11 RX ADMIN — LISINOPRIL 40 MG: 20 TABLET ORAL at 08:04

## 2018-04-11 RX ADMIN — ESCITALOPRAM OXALATE 10 MG: 10 TABLET ORAL at 08:04

## 2018-04-11 RX ADMIN — Medication 1 TABLET: at 08:04

## 2018-04-11 RX ADMIN — LACTULOSE 15 G: 10 SOLUTION ORAL at 08:04

## 2018-04-11 RX ADMIN — DIAZEPAM 5 MG: 5 TABLET ORAL at 08:04
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
## 2018-04-11 NOTE — PROGRESS NOTES
04/10/18 2200   [facility] Group Therapy   Group Name Community Reintegration   Specific Interventions Other (see comments)   Participation Level Active   Participation Quality Cooperative   Insight/Motivation Good   Affect/Mood Display Blunted   Cognition Alert
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
## 2018-04-11 NOTE — PROGRESS NOTES
Pt seen in treatment team meeting. Discussed discharge to next level of care.  Pt will transfer at 11:00 to [facility] in [facility] for 4:00 today.  Prescriptions filled by [facility]  was picked up for pt to take with him. Pt is ready to go.
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
## 2018-04-11 NOTE — PSYCH
Patient calm and cooperative. Mood good and motivated. Sociable and participated in unit activites. Dressed casually with good grooming. Denies SI/HI/AVH. Denies any drug withdrawal. Vital signs stable. Meet with treatment team. Dr. Barker discussed medications, follow up and discharge home instructions. Verbalized understanding. Given prescriptions medications and discharge home instructions. Discharge ambulatory via Julianna's transportation to [facility] in [place].
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
## 2018-07-27 ENCOUNTER — HOSPITAL ENCOUNTER (EMERGENCY)
Facility: HOSPITAL | Age: 53
Discharge: HOME | End: 2018-07-27
Attending: EMERGENCY MEDICINE
Payer: COMMERCIAL

## 2018-07-27 VITALS
BODY MASS INDEX: 26.98 KG/M2 | OXYGEN SATURATION: 97 % | TEMPERATURE: 99 F | HEART RATE: 65 BPM | DIASTOLIC BLOOD PRESSURE: 77 MMHG | SYSTOLIC BLOOD PRESSURE: 135 MMHG | WEIGHT: 158 LBS | RESPIRATION RATE: 18 BRPM | HEIGHT: 64 IN

## 2018-07-27 DIAGNOSIS — J20.9 BRONCHOSPASM WITH BRONCHITIS, ACUTE: ICD-10-CM

## 2018-07-27 DIAGNOSIS — E78.5 HYPERLIPIDEMIA, UNSPECIFIED HYPERLIPIDEMIA TYPE: ICD-10-CM

## 2018-07-27 DIAGNOSIS — R05.9 COUGH: ICD-10-CM

## 2018-07-27 DIAGNOSIS — N20.0 RENAL STONE: ICD-10-CM

## 2018-07-27 DIAGNOSIS — J40 BRONCHITIS: Primary | ICD-10-CM

## 2018-07-27 LAB
ALBUMIN SERPL-MCNC: 3.4 G/DL (ref 3.3–5.5)
ALP SERPL-CCNC: 74 U/L (ref 42–141)
BILIRUB SERPL-MCNC: 0.8 MG/DL (ref 0.2–1.6)
BILIRUBIN, POC UA: NEGATIVE
BLOOD, POC UA: ABNORMAL
BUN SERPL-MCNC: 7 MG/DL (ref 7–22)
CALCIUM SERPL-MCNC: 8.9 MG/DL (ref 8–10.3)
CHLORIDE SERPL-SCNC: 102 MMOL/L (ref 98–108)
CLARITY, POC UA: CLEAR
COLOR, POC UA: YELLOW
CREAT SERPL-MCNC: 1 MG/DL (ref 0.6–1.2)
GLUCOSE SERPL-MCNC: 99 MG/DL (ref 73–118)
GLUCOSE, POC UA: NEGATIVE
KETONES, POC UA: NEGATIVE
LEUKOCYTE EST, POC UA: NEGATIVE
NITRITE, POC UA: NEGATIVE
PH UR STRIP: 6.5 [PH]
POC ALT (SGPT): 22 U/L (ref 10–47)
POC AST (SGOT): 25 U/L (ref 11–38)
POC TCO2: 25 MMOL/L (ref 18–33)
POTASSIUM BLD-SCNC: 3.6 MMOL/L (ref 3.6–5.1)
PROTEIN, POC UA: NEGATIVE
PROTEIN, POC: 6.5 G/DL (ref 6.4–8.1)
SODIUM BLD-SCNC: 137 MMOL/L (ref 128–145)
SPECIFIC GRAVITY, POC UA: 1.02
UROBILINOGEN, POC UA: 0.2 E.U./DL

## 2018-07-27 PROCEDURE — 85025 COMPLETE CBC W/AUTO DIFF WBC: CPT

## 2018-07-27 PROCEDURE — 25000003 PHARM REV CODE 250: Performed by: EMERGENCY MEDICINE

## 2018-07-27 PROCEDURE — 25000242 PHARM REV CODE 250 ALT 637 W/ HCPCS: Performed by: EMERGENCY MEDICINE

## 2018-07-27 PROCEDURE — 80053 COMPREHEN METABOLIC PANEL: CPT

## 2018-07-27 PROCEDURE — 81003 URINALYSIS AUTO W/O SCOPE: CPT

## 2018-07-27 PROCEDURE — 96360 HYDRATION IV INFUSION INIT: CPT

## 2018-07-27 PROCEDURE — 94640 AIRWAY INHALATION TREATMENT: CPT

## 2018-07-27 PROCEDURE — 94760 N-INVAS EAR/PLS OXIMETRY 1: CPT

## 2018-07-27 PROCEDURE — 63600175 PHARM REV CODE 636 W HCPCS: Performed by: EMERGENCY MEDICINE

## 2018-07-27 PROCEDURE — 99284 EMERGENCY DEPT VISIT MOD MDM: CPT | Mod: 25

## 2018-07-27 RX ORDER — ALBUTEROL SULFATE 90 UG/1
1-2 AEROSOL, METERED RESPIRATORY (INHALATION) EVERY 6 HOURS PRN
Qty: 1 INHALER | Refills: 0 | Status: SHIPPED | OUTPATIENT
Start: 2018-07-27 | End: 2019-07-27

## 2018-07-27 RX ORDER — PREDNISONE 20 MG/1
40 TABLET ORAL DAILY
Qty: 10 TABLET | Refills: 0 | Status: SHIPPED | OUTPATIENT
Start: 2018-07-27 | End: 2018-08-01

## 2018-07-27 RX ORDER — HYDROCODONE BITARTRATE AND ACETAMINOPHEN 5; 325 MG/1; MG/1
1 TABLET ORAL
Status: COMPLETED | OUTPATIENT
Start: 2018-07-27 | End: 2018-07-27

## 2018-07-27 RX ORDER — SIMVASTATIN 40 MG/1
40 TABLET, FILM COATED ORAL NIGHTLY
Qty: 90 TABLET | Refills: 0 | Status: SHIPPED | OUTPATIENT
Start: 2018-07-27

## 2018-07-27 RX ORDER — IPRATROPIUM BROMIDE AND ALBUTEROL SULFATE 2.5; .5 MG/3ML; MG/3ML
3 SOLUTION RESPIRATORY (INHALATION) ONCE
Status: COMPLETED | OUTPATIENT
Start: 2018-07-27 | End: 2018-07-27

## 2018-07-27 RX ORDER — SODIUM CHLORIDE 9 MG/ML
1000 INJECTION, SOLUTION INTRAVENOUS
Status: COMPLETED | OUTPATIENT
Start: 2018-07-27 | End: 2018-07-27

## 2018-07-27 RX ORDER — TRAMADOL HYDROCHLORIDE 50 MG/1
50 TABLET ORAL EVERY 8 HOURS PRN
Qty: 15 TABLET | Refills: 0 | Status: SHIPPED | OUTPATIENT
Start: 2018-07-27 | End: 2018-08-01

## 2018-07-27 RX ADMIN — IPRATROPIUM BROMIDE AND ALBUTEROL SULFATE 3 ML: .5; 2.5 SOLUTION RESPIRATORY (INHALATION) at 09:07

## 2018-07-27 RX ADMIN — HYDROCODONE BITARTRATE AND ACETAMINOPHEN 1 TABLET: 5; 325 TABLET ORAL at 09:07

## 2018-07-27 RX ADMIN — SODIUM CHLORIDE 1000 ML: 0.9 INJECTION, SOLUTION INTRAVENOUS at 09:07

## 2018-07-27 RX ADMIN — PREDNISONE 50 MG: 20 TABLET ORAL at 09:07

## 2018-07-27 NOTE — ED PROVIDER NOTES
EM PHYSICIAN NOTE    HPI  This patient presents with a complaint of   Chief Complaint   Patient presents with    Cough     pt presents to ED with c/o productive cough for 1 mth and lower back pain, states it feels like a UTI. Subjective fever last night.     Back Pain       HPI:  The this is a 52-year-old gentleman with a history of CVA in 2014 leaving him with some right-sided weakness. Patient walks with a cane.  The has a history of intermittent low back pain as well as renal stones.  He presents today with 2 complaints is 1st complaint is 3 weeks of cough productive of yellowish sputum.  There has been no fevers the but last night the patient reported chills.  There has been no hemoptysis.  There has been no weight change.  Patient is a long-term tobacco smoker.    The patient's 2nd complaint is right sided low back pain which started the 2 days ago.  Patient noticed this morning that his urine was dark.  There has been no dysuria.  He has no history of recent trauma.    The patient was educated about the need for tobacco cessation    REVIEW of PMH, SOC History and Family History:  Past Medical History:   Diagnosis Date    Anxiety     Depression     Heart murmur     Hepatitis C     History of psychiatric hospitalization     5 previous hospitalizations.  Last was in 2015 at [facility]    HTN (hypertension) 11/6/2014    Hx of psychiatric care     Celexa, Zoloft, Seroquel     Psychiatric problem     depression and anxiety    Stroke     right side weakness    Therapy     reports last saw psychiatrist last year and he does not remember the name     Past Surgical History:   Procedure Laterality Date    APPENDECTOMY      FOOT SURGERY       Social History     Social History    Marital status:      Spouse name: N/A    Number of children: 4    Years of education: N/A     Occupational History    none      Social History Main Topics    Smoking status: Current Every Day Smoker     Packs/day: 1.00      "Years: 43.00     Types: Cigarettes    Smokeless tobacco: Never Used      Comment: patient ran out of Chantix    Alcohol use Yes      Comment: Occasionally, last use was 3 days ago    Drug use: Yes     Types: "Crack" cocaine, Marijuana      Comment: using both daily, $100 of crack daily    Sexual activity: Yes     Partners: Female      Comment:      Other Topics Concern    Patient Feels They Ought To Cut Down On Drinking/Drug Use Yes    Patient Annoyed By Others Criticizing Their Drinking/Drug Use Yes    Patient Has Felt Bad Or Guilty About Drinking/Drug Use Yes    Patient Has Had A Drink/Used Drugs As An Eye Opener In The [place] No     Social History Narrative    ** Merged History Encounter **     lives with wife and roommate. Kids taken away, 9 and 15.     Patient Active Problem List   Diagnosis    History of cerebral infarction    Essential hypertension    Humerus fracture    Limb pain    Infected dental carries    Hyperlipidemia    Hemiparesis affecting dominant side as late effect of cerebrovascular accident    Dysarthria    Gait instability    Pain in shoulder    Substance induced mood disorder    Cocaine use disorder, severe, dependence    Marijuana use, continuous    Tobacco abuse disorder    Microcytic anemia    Flank pain    Abdominal pain    Back pain    Fall    Back muscle spasm    Medication refill    Diverticulosis of large intestine without hemorrhage    Chronic hepatitis C without hepatic coma    Alcohol use disorder     Current Facility-Administered Medications   Medication Dose Route Frequency Provider Last Rate Last Dose    HYDROcodone-acetaminophen 5-325 mg per tablet 1 tablet  1 tablet Oral ED 1 Time Grzegorz Campo MD         Current Outpatient Prescriptions   Medication Sig Dispense Refill    clopidogrel (PLAVIX) 75 mg tablet Take 1 tablet (75 mg total) by mouth once daily. 90 tablet 11    lisinopril (PRINIVIL,ZESTRIL) 40 MG tablet Take 1 tablet (40 mg " total) by mouth once daily. 30 tablet 0    simvastatin (ZOCOR) 40 MG tablet Take 1 tablet (40 mg total) by mouth every evening. 90 tablet 11    clopidogrel (PLAVIX) 75 mg tablet Take 1 tablet (75 mg total) by mouth once daily. 30 tablet 0    escitalopram oxalate (LEXAPRO) 10 MG tablet Take 1 tablet (10 mg total) by mouth once daily. 30 tablet 0    folic acid-vit B6-vit B12 2.5-25-2 mg (FOLBIC OR EQUIV) 2.5-25-2 mg Tab Take 1 tablet by mouth once daily. 30 tablet 0    lisinopril (PRINIVIL,ZESTRIL) 40 MG tablet Take 1 tablet (40 mg total) by mouth once daily. 90 tablet 11    mirtazapine (REMERON SOL-TAB) 30 MG disintegrating tablet Take 1 tablet (30 mg total) by mouth nightly. 30 tablet 0    naproxen (NAPROSYN) 500 MG tablet Take 1 tablet (500 mg total) by mouth 2 (two) times daily with meals. 20 tablet 0    omeprazole (PRILOSEC) 40 MG capsule Take 1 capsule (40 mg total) by mouth every morning. Take 30 minutes before eating on an empty stomach 90 capsule 2     Review of patient's allergies indicates:   Allergen Reactions    Aspirin Shortness Of Breath    Codeine Shortness Of Breath    Penicillins Shortness Of Breath and Rash    Toradol [ketorolac] Anaphylaxis    Shellfish containing products Nausea And Vomiting    Sulfa (sulfonamide antibiotics)     Naproxen Rash       Immunization History   Administered Date(s) Administered    Influenza - Trivalent - PF (ADULT) 11/06/2014    Pneumococcal Polysaccharide - 23 Valent 11/06/2014    Tdap 03/25/2015, 04/28/2016       Family History   Problem Relation Age of Onset    Drug abuse Mother     Heart disease Mother     Alcohol abuse Father     Heart disease Father        REVIEW of SYSTEMS  Source:  Patient and wife  The nurse's notes and triage vital signs were reviewed.  GENERAL/CONSTITUTIONAL: There is no report of fever, fatigue, weakness, or unexplained weight loss.  CARDIOVASCULAR: There is no report of chest pain   RESPIRATORY: There is no report of  SOB  GASTROINTESTINAL: There is no report of nausea, vomiting, diarrhea  MUSCULOSKELETAL:  See HPI  SKIN AND BREASTS: There is no report of easy bruising, skin redness, skin rash.  HEMATOLOGIC/LYMPHATIC: There is no report of anemia, bleeding  The remainder of the ROS is negative.    PHYSICAL EXAMINATION  Temp:  [98.5 °F (36.9 °C)] 98.5 °F (36.9 °C)  Pulse:  [80] 80  Resp:  [16] 16  BP: (138)/(87) 138/87    Vital signs and Pulse Ox reviewed in clinical context. Abnormalities noted:  Pre hypertension noted and the patient will be referred back to his primary care physician close follow-up  Pt's level of consciousness is alert, and the patient is in mild distress.  Skin: warm, pink and dry  Mucosa:moist  Head and Neck: WNL  Cardiac exam: RRR, no murmur  Pulmonary exam: unlabored, but breath sounds are slightly diminished throughout with end-expiratory wheezing noted in the apices  Abd and back Exam:  Soft and nontender.  There is no CVA tenderness.  Patient does report some pain localized to his right lower back the  Musculoskeletal: no joint tenderness, deformity or swelling   Neurologic: 5 over 5 strength, normal gait, cranial nerves intact, neck supple     Medical decision making: History obtained from family, Nursing notes reviewed and incorporated and Labs reviewed.  The prescription monitoring program was accessed and the patient appears to have been intermittently taking Norco and tramadol for pain    Impression:  Bronchitis rule out pneumonia; back pain rule out renal stone, bronchospasm  Plan:  Labs, imaging, analgesics, DuoNeb and reassess  Grzegorz Campo MD, 8:32 AM 7/27/2018    This note was created using Dictation Software.  This program may occasionally misinterpret certain words and phrases.          Imaging Results          CT Renal Stone Study ABD Pelvis WO (Final result)  Result time 07/27/18 09:12:05    Final result by Rosendo Renae MD (07/27/18 09:12:05)                 Impression:       1. Questionable tiny nonobstructive stones right kidney seen best on liver windows setting.  No hydronephrosis or other change from 2017.  2. Small hiatal hernia and diverticulosis coli without diverticulitis stable.  Additional remote stable findings above.      Electronically signed by: Rosendo Renae MD  Date:    07/27/2018  Time:    09:12             Narrative:    EXAMINATION:  CT RENAL STONE STUDY ABD PELVIS WO    CLINICAL HISTORY:  Flank pain, recurrent stone disease suspected;    TECHNIQUE:  Low dose axial images, sagittal and coronal reformations were obtained from the lung bases to the pubic symphysis.  Contrast was not administered.    COMPARISON:  Two thousand seventeen    FINDINGS:  Less dependent lung congestion.  No free air.  Degenerative disc spondylosis with vacuum disc deformity L5-S1 with facet joint arthropathy.  Mild DJD SI joints.    Liver, gallbladder, spleen, adrenal glands, pancreas normal.    Few very tiny nonobstructive stones question at level renal pelvis.  Mid junction lower, mid, upper pole right kidney.  No hydronephrosis or abnormality urinary bladder and ureters.  Prostate gland focal tiny calcification, gland size 4.4 cm.    Scattered calcified plaque aorta major branches.  Small hiatal hernia.    Diverticulosis coli sigmoid colon without diverticulitis.  Appendix appears very tiny before and not seen best advantage on today's exam.                               X-Ray Chest PA And Lateral (Final result)  Result time 07/27/18 08:58:09    Final result by Mert Goncalves MD (07/27/18 08:58:09)                 Impression:      No acute intrathoracic processes.      Electronically signed by: Mert Goncalves MD  Date:    07/27/2018  Time:    08:58             Narrative:    EXAMINATION:  XR CHEST PA AND LATERAL    CLINICAL HISTORY:  Cough    TECHNIQUE:  PA and lateral views of the chest were performed.    COMPARISON:  04/04/2018    FINDINGS:  Again there is no lobar consolidations or  pneumothorax or pulmonary vascular congestion or pleural effusions.  Cardiomediastinal silhouette remains within normal limits.  Minor chronic interstitial scarring also remains without significant change.  There is no significant bony thorax abnormalities.                              IMP:  Acute Bronchitis with bronchospasm, nonobstructing renal stone with microscopic hematuria  Plan:  The follow-up with Urology, analgesics, prednisone         Grzegorz Campo MD  07/27/18 0934       Grzegorz Campo MD  07/27/18 0939       Grzegorz Campo MD  07/27/18 0953

## 2018-12-11 ENCOUNTER — HOSPITAL ENCOUNTER (EMERGENCY)
Facility: HOSPITAL | Age: 53
Discharge: HOME | End: 2018-12-11
Attending: EMERGENCY MEDICINE
Payer: COMMERCIAL

## 2018-12-11 VITALS
OXYGEN SATURATION: 98 % | SYSTOLIC BLOOD PRESSURE: 156 MMHG | TEMPERATURE: 98 F | WEIGHT: 165 LBS | DIASTOLIC BLOOD PRESSURE: 73 MMHG | HEIGHT: 66 IN | HEART RATE: 86 BPM | BODY MASS INDEX: 26.52 KG/M2 | RESPIRATION RATE: 18 BRPM

## 2018-12-11 DIAGNOSIS — R07.9 CHEST PAIN: Primary | ICD-10-CM

## 2018-12-11 DIAGNOSIS — F41.9 ANXIETY: ICD-10-CM

## 2018-12-11 DIAGNOSIS — K02.9 PAIN DUE TO DENTAL CARIES: ICD-10-CM

## 2018-12-11 DIAGNOSIS — R07.89 CHEST DISCOMFORT: ICD-10-CM

## 2018-12-11 LAB
ALBUMIN SERPL-MCNC: 3.1 G/DL (ref 3.3–5.5)
ALP SERPL-CCNC: 65 U/L (ref 42–141)
BILIRUB SERPL-MCNC: 0.5 MG/DL (ref 0.2–1.6)
BILIRUBIN, POC UA: NEGATIVE
BLOOD, POC UA: NEGATIVE
BUN SERPL-MCNC: 8 MG/DL (ref 7–22)
CALCIUM SERPL-MCNC: 8.8 MG/DL (ref 8–10.3)
CHLORIDE SERPL-SCNC: 106 MMOL/L (ref 98–108)
CLARITY, POC UA: CLEAR
COLOR, POC UA: YELLOW
CREAT SERPL-MCNC: 0.8 MG/DL (ref 0.6–1.2)
GLUCOSE SERPL-MCNC: 107 MG/DL (ref 73–118)
GLUCOSE, POC UA: NEGATIVE
KETONES, POC UA: NEGATIVE
LEUKOCYTE EST, POC UA: NEGATIVE
NITRITE, POC UA: NEGATIVE
PH UR STRIP: 7.5 [PH]
POC ALT (SGPT): 21 U/L (ref 10–47)
POC AST (SGOT): 22 U/L (ref 11–38)
POC B-TYPE NATRIURETIC PEPTIDE: 15.5 PG/ML (ref 0–100)
POC CARDIAC TROPONIN I: 0 NG/ML
POC CARDIAC TROPONIN I: 0 NG/ML
POC PTINR: 0.9 (ref 0.9–1.2)
POC PTWBT: 11.4 SEC (ref 9.7–14.3)
POC TCO2: 27 MMOL/L (ref 18–33)
POTASSIUM BLD-SCNC: 3.5 MMOL/L (ref 3.6–5.1)
PROTEIN, POC UA: NEGATIVE
PROTEIN, POC: 6.5 G/DL (ref 6.4–8.1)
SAMPLE: NORMAL
SODIUM BLD-SCNC: 144 MMOL/L (ref 128–145)
SPECIFIC GRAVITY, POC UA: 1.02
UROBILINOGEN, POC UA: 0.2 E.U./DL

## 2018-12-11 PROCEDURE — 93005 ELECTROCARDIOGRAM TRACING: CPT

## 2018-12-11 PROCEDURE — 93010 ELECTROCARDIOGRAM REPORT: CPT | Mod: ,,, | Performed by: INTERNAL MEDICINE

## 2018-12-11 PROCEDURE — 84484 ASSAY OF TROPONIN QUANT: CPT

## 2018-12-11 PROCEDURE — 85025 COMPLETE CBC W/AUTO DIFF WBC: CPT

## 2018-12-11 PROCEDURE — 83880 ASSAY OF NATRIURETIC PEPTIDE: CPT

## 2018-12-11 PROCEDURE — 85610 PROTHROMBIN TIME: CPT

## 2018-12-11 PROCEDURE — 25000003 PHARM REV CODE 250: Performed by: EMERGENCY MEDICINE

## 2018-12-11 PROCEDURE — 63600175 PHARM REV CODE 636 W HCPCS: Performed by: EMERGENCY MEDICINE

## 2018-12-11 PROCEDURE — 99284 EMERGENCY DEPT VISIT MOD MDM: CPT | Mod: 25

## 2018-12-11 PROCEDURE — 81003 URINALYSIS AUTO W/O SCOPE: CPT

## 2018-12-11 PROCEDURE — 96374 THER/PROPH/DIAG INJ IV PUSH: CPT

## 2018-12-11 PROCEDURE — 80053 COMPREHEN METABOLIC PANEL: CPT

## 2018-12-11 RX ORDER — ALPRAZOLAM 0.25 MG/1
0.25 TABLET ORAL 3 TIMES DAILY
COMMUNITY

## 2018-12-11 RX ORDER — POTASSIUM CHLORIDE 20 MEQ/1
20 TABLET, EXTENDED RELEASE ORAL
Status: COMPLETED | OUTPATIENT
Start: 2018-12-11 | End: 2018-12-11

## 2018-12-11 RX ORDER — LIDOCAINE HYDROCHLORIDE 20 MG/ML
SOLUTION OROPHARYNGEAL
Qty: 30 ML | Refills: 0 | Status: SHIPPED | OUTPATIENT
Start: 2018-12-11

## 2018-12-11 RX ORDER — HYDROXYZINE HYDROCHLORIDE 25 MG/1
25 TABLET, FILM COATED ORAL 3 TIMES DAILY PRN
Qty: 12 TABLET | Refills: 0 | Status: SHIPPED | OUTPATIENT
Start: 2018-12-11

## 2018-12-11 RX ORDER — NITROGLYCERIN 0.4 MG/1
0.4 TABLET SUBLINGUAL EVERY 5 MIN PRN
Status: DISCONTINUED | OUTPATIENT
Start: 2018-12-11 | End: 2018-12-11 | Stop reason: HOSPADM

## 2018-12-11 RX ADMIN — POTASSIUM CHLORIDE 20 MEQ: 1500 TABLET, EXTENDED RELEASE ORAL at 02:12

## 2018-12-11 RX ADMIN — LORAZEPAM 1 MG: 2 INJECTION INTRAMUSCULAR; INTRAVENOUS at 12:12

## 2018-12-11 NOTE — ED NOTES
"Offered medication to patient. Patient refused nitroglycerin. States "I'm not having any pain now"  "

## 2018-12-11 NOTE — ED PROVIDER NOTES
Encounter Date: 12/11/2018    SCRIBE #1 NOTE: I, Margoth Neff, am scribing for, and in the presence of,  Dr. Vang. I have scribed the following portions of the note - Other sections scribed: HPI, ROS, PE.       History     Chief Complaint   Patient presents with    Chest Discomfort     pt reports he began having non radiating mid chest discomfort yesterday while in the dentist office. pt reports he wasn't able to get tooth pulled due to elevated BP. pt also c/o generalized headache since yesterday. denies taking any medicine for symptoms.     Dental Pain    Headache     This is a 53 y.o. male who presents to the ED complaining of       The history is provided by the patient. No  was used.     Review of patient's allergies indicates:   Allergen Reactions    Aspirin Shortness Of Breath    Codeine Shortness Of Breath    Penicillins Shortness Of Breath and Rash    Toradol [ketorolac] Anaphylaxis    Shellfish containing products Nausea And Vomiting    Sulfa (sulfonamide antibiotics)     Naproxen Rash     Past Medical History:   Diagnosis Date    Anxiety     Depression     Heart murmur     Hepatitis C     History of psychiatric hospitalization     5 previous hospitalizations.  Last was in 2015 at [facility]    HTN (hypertension) 11/6/2014    Hx of psychiatric care     Celexa, Zoloft, Seroquel     Psychiatric problem     depression and anxiety    Stroke     right side weakness    Therapy     reports last saw psychiatrist last year and he does not remember the name     Past Surgical History:   Procedure Laterality Date    APPENDECTOMY      FOOT SURGERY       Family History   Problem Relation Age of Onset    Drug abuse Mother     Heart disease Mother     Alcohol abuse Father     Heart disease Father      Social History     Tobacco Use    Smoking status: Current Every Day Smoker     Packs/day: 1.00     Years: 43.00     Pack years: 43.00     Types: Cigarettes    Smokeless  "tobacco: Never Used    Tobacco comment: patient ran out of [employer]   Substance Use Topics    Alcohol use: Yes     Comment: Occasionally, last use was 3 days ago    Drug use: Yes     Types: "Crack" cocaine, Marijuana     Comment: using both daily, $100 of crack daily     Review of Systems   Cardiovascular: Positive for chest pain.   All other systems reviewed and are negative.      Physical Exam     Initial Vitals [12/11/18 1048]   BP Pulse Resp Temp SpO2   (S) (!) 161/88 98 17 98.2 °F (36.8 °C) 97 %      MAP       --         Physical Exam    ED Course   Procedures  Labs Reviewed   POCT URINALYSIS W/O SCOPE          Imaging Results    None                     Scribe Attestation:   Scribe #1: I performed the above scribed service and the documentation accurately describes the services I performed. I attest to the accuracy of the note.    ***           Clinical Impression:     1. Chest discomfort    2. Chest pain                                "

## 2018-12-11 NOTE — ED PROVIDER NOTES
Encounter Date: 12/11/2018    SCRIBE #1 NOTE: I, Margoth Neff, am scribing for, and in the presence of,  Dr. Vang. I have scribed the following portions of the note - Other sections scribed: HPI, ROS, PE.       History     Chief Complaint   Patient presents with    Chest Discomfort     pt reports he began having non radiating mid chest discomfort yesterday while in the dentist office. pt reports he wasn't able to get tooth pulled due to elevated BP. pt also c/o generalized headache since yesterday. denies taking any medicine for symptoms.     Dental Pain    Headache     This is a 53 y.o. male with past medical history of HTN and stroke who presents to the ED most concerned for his high blood pressure. He is also concerned for headaches he has been having the past week and chest pain that began this morning.  Patient is requesting refill for Xanax and pain medicine.  He reports mild intermittent needle-like chest pain since 3AM this morning while at rest. Pain last 10-15 seconds. No radiation of pain. No chest pain now. He is allergic to aspirin. He was awake when the chest pain began because he was worried his blood pressure was high. He took double the dose of his blood pressure medication last night before the chest pain began. He went to the dentist this morning to have teeth pulled. The dentist would not pull his teeth because of his high blood pressure, 168/105. He reports completing a course of Clindamycin for an infection before visiting the dentist today. He is still complaining up right upper dental pain.    He describes the headache as intermittent stabbing pains to his brain for one week. Denies taking medication for pain.    He does not have a PCP. He visited the ED at [facility] two weeks ago. He was prescribed a 90 day medication refill for his daily medications and 12 tablets of Xanax.      The history is provided by the patient. No  was used.     Review of patient's  "allergies indicates:   Allergen Reactions    Aspirin Shortness Of Breath    Codeine Shortness Of Breath    Penicillins Shortness Of Breath and Rash    Toradol [ketorolac] Anaphylaxis    Shellfish containing products Nausea And Vomiting    Sulfa (sulfonamide antibiotics)     Naproxen Rash     Past Medical History:   Diagnosis Date    Anxiety     Depression     Heart murmur     Hepatitis C     History of psychiatric hospitalization     5 previous hospitalizations.  Last was in 2015 at [facility]    HTN (hypertension) 11/6/2014    Hx of psychiatric care     Celexa, Zoloft, Seroquel     Psychiatric problem     depression and anxiety    Stroke     right side weakness    Therapy     reports last saw psychiatrist last year and he does not remember the name     Past Surgical History:   Procedure Laterality Date    APPENDECTOMY      FOOT SURGERY       Family History   Problem Relation Age of Onset    Drug abuse Mother     Heart disease Mother     Alcohol abuse Father     Heart disease Father      Social History     Tobacco Use    Smoking status: Current Every Day Smoker     Packs/day: 1.00     Years: 43.00     Pack years: 43.00     Types: Cigarettes    Smokeless tobacco: Never Used    Tobacco comment: patient ran out of [employer]   Substance Use Topics    Alcohol use: Yes     Comment: Occasionally, last use was 3 days ago    Drug use: Yes     Types: "Crack" cocaine, Marijuana     Comment: using both daily, $100 of crack daily     Review of Systems   Constitutional: Negative for diaphoresis and fever.   HENT: Positive for dental problem. Negative for mouth sores and sore throat.    Eyes: Negative for visual disturbance.   Respiratory: Negative for shortness of breath.    Cardiovascular: Positive for chest pain.   Gastrointestinal: Negative for nausea and vomiting.   Genitourinary: Negative for dysuria.   Musculoskeletal: Negative for back pain.   Skin: Negative for rash.   Neurological: Positive for " headaches. Negative for speech difficulty and weakness.   Hematological: Does not bruise/bleed easily.   Psychiatric/Behavioral: Negative for self-injury and suicidal ideas. The patient is nervous/anxious.    All other systems reviewed and are negative.      Physical Exam     Initial Vitals [12/11/18 1048]   BP Pulse Resp Temp SpO2   (S) (!) 161/88 98 17 98.2 °F (36.8 °C) 97 %      MAP       --           Patient gave consent to have physical exam performed.    Physical Exam    Nursing note and vitals reviewed.  Constitutional: He appears well-developed and well-nourished.   HENT:   Head: Normocephalic and atraumatic.   Right Ear: External ear normal.   Left Ear: External ear normal.   Nose: Nose normal.   Mouth/Throat: Uvula is midline, oropharynx is clear and moist and mucous membranes are normal. No trismus in the jaw. Abnormal dentition. No dental abscesses or uvula swelling.       Tenderness to right upper dental. Diffuse missing upper teeth.    Eyes: Conjunctivae and EOM are normal. Pupils are equal, round, and reactive to light.   Neck: Normal range of motion. Neck supple.   Cardiovascular: Normal rate, regular rhythm, normal heart sounds and intact distal pulses. Exam reveals no gallop and no friction rub.    No murmur heard.  Pulmonary/Chest: Breath sounds normal. No stridor. No respiratory distress. He has no wheezes. He has no rhonchi. He has no rales. He exhibits no tenderness.   Abdominal: Soft. Bowel sounds are normal. He exhibits no distension and no mass. There is no tenderness. There is no rebound and no guarding.   Musculoskeletal: Normal range of motion.   Neurological: He is alert and oriented to person, place, and time. He has normal strength. No cranial nerve deficit or sensory deficit. GCS score is 15. GCS eye subscore is 4. GCS verbal subscore is 5. GCS motor subscore is 6.   Skin: Skin is warm and dry. Capillary refill takes less than 2 seconds. No rash noted.   Psychiatric: He has a normal  mood and affect. His behavior is normal.         ED Course   Procedures  Labs Reviewed   POCT URINALYSIS W/O SCOPE - Abnormal; Notable for the following components:       Result Value    Glucose, UA Negative (*)     Bilirubin, UA Negative (*)     Ketones, UA Negative (*)     Blood, UA Negative (*)     Protein, UA Negative (*)     Nitrite, UA Negative (*)     Leukocytes, UA Negative (*)     All other components within normal limits   POCT CMP - Abnormal; Notable for the following components:    Albumin, POC 3.1 (*)     POC Potassium 3.5 (*)     All other components within normal limits   TROPONIN ISTAT   TROPONIN ISTAT   POCT CBC   POCT URINALYSIS W/O SCOPE   ISTAT PROCEDURE   POCT B-TYPE NATRIURETIC PEPTIDE (BNP)   POCT CMP   POCT PROTIME-INR   POCT TROPONIN   POCT B-TYPE NATRIURETIC PEPTIDE (BNP)   POCT TROPONIN     EKG Readings: (Independently Interpreted)   Initial Reading: No STEMI. Previous EKG: Compared with most recent EKG Previous EKG Date: When compared to prior EKG April 4, 2018 rate has increased by 4 beats per minute today. Rhythm: Normal Sinus Rhythm. Heart Rate: One hundred. Axis: Normal. Other Impression: Abnormal EKG, nonspecific ST wave abnormality appreciated.  QTC is normal at 430.   Other EKG Interpretations: Repeat EKG done at 2:11 p.m. ventricular rate of 86 normal access, when compared to EKG done earlier  today rate has decreased by 10 beats per minute. QTC normal at 426       Imaging Results          X-Ray Chest PA And Lateral (Final result)  Result time 12/11/18 11:13:36    Final result by Elivs Whitfield MD (12/11/18 11:13:36)                 Impression:      No evidence of acute cardiopulmonary disease.    Remote left rib fractures.      Electronically signed by: Elvis Whitfield MD  Date:    12/11/2018  Time:    11:13             Narrative:    EXAMINATION:  XR CHEST PA AND LATERAL    CLINICAL HISTORY:  Chest Pain;    TECHNIQUE:  PA and lateral views of the chest were  performed.    COMPARISON:  07/27/2018    FINDINGS:  Multiple overlying cardiac monitoring leads. The cardiomediastinal silhouette is normal in size and midline. Pulmonary vascularity appears within normal limits.    The lungs appear clear without confluent pulmonary parenchymal opacity. No pleural fluid.    Remote left rib fractures.  No acute fracture identified.                                 Medical Decision Making:   Clinical Tests:   Lab Tests: Ordered and Reviewed  Radiological Study: Ordered and Reviewed  Medical Tests: Ordered and Reviewed    Chief complaint:  Chest pain, dental pain, out of Xanax and wants stronger pain medicine  Differential diagnosis:  STEMI, NSTEMI, dental pain, dental decay, and drug-seeking behavior  Treatment in the ED Physical Exam, topical lidocaine for dental pain, nitroglycerin refused, Ativan for anxiety, and oral potassium.  Patient threatened to leave AMA multiple times during visit.  Patient is here for stronger pain medicine and Xanax.  At initial presentation I told patient because he has had multiple narcotic prescriptions written by multiple physicians in the last few months, I would be unable to write him any narcotic prescriptions today.  I advised patient to follow up with his primary care in the next 1-2 days.  Discussed labs, and imaging results.     Potassium of 3.5 oral replacement given.  CBC white blood cell count 8.6, hemoglobin 10.3, hematocrit 31.8 and platelets 387  Patient presents with chest pain for 12 hours . Initial troponin of 0.00.  Repeat troponin of 0.00. .  No EKG  changes and no acute issues on chest x-ray. so  chest pain unlikely to be cardiac in origin.     Fill and take prescriptions as directed.  Return to the ED if symptoms worsen or do not resolve.   Answered questions and discussed discharge plan.    Patient feels much better and is ready for discharge.  Follow up with PCP in 1 days.            Scribe Attestation:   Scribe #1: I performed  the above scribed service and the documentation accurately describes the services I performed. I attest to the accuracy of the note.     I, Dr. Tanesha Vang, personally performed the services described in this documentation. This document was produced by a scribe under my direction and in my presence. All medical record entries made by the scribe were at my direction and in my presence.  I have reviewed the chart and agree that the record reflects my personal performance and is accurate and complete. Tanesha Vang DO.     12/11/2018 3:07 PM             Clinical Impression:     1. Chest pain    2. Chest discomfort    3. Anxiety    4. Pain due to dental caries                                   Tanesha Vang DO  12/11/18 [id number]

## 2021-08-25 ENCOUNTER — HOSPITAL ENCOUNTER (EMERGENCY)
Facility: HOSPITAL | Age: 56
Discharge: HOME | End: 2021-08-25
Attending: EMERGENCY MEDICINE
Payer: MEDICARE

## 2021-08-25 VITALS
HEART RATE: 91 BPM | BODY MASS INDEX: 29.77 KG/M2 | DIASTOLIC BLOOD PRESSURE: 82 MMHG | SYSTOLIC BLOOD PRESSURE: 139 MMHG | RESPIRATION RATE: 20 BRPM | OXYGEN SATURATION: 95 % | HEIGHT: 63 IN | TEMPERATURE: 98 F | WEIGHT: 168 LBS

## 2021-08-25 DIAGNOSIS — R07.89 LEFT-SIDED CHEST WALL PAIN: ICD-10-CM

## 2021-08-25 DIAGNOSIS — S20.212A RIB CONTUSION, LEFT, INITIAL ENCOUNTER: Primary | ICD-10-CM

## 2021-08-25 PROCEDURE — 94799 UNLISTED PULMONARY SVC/PX: CPT | Mod: ER

## 2021-08-25 PROCEDURE — 99284 EMERGENCY DEPT VISIT MOD MDM: CPT | Mod: ER,25

## 2021-08-25 PROCEDURE — 99900035 HC TECH TIME PER 15 MIN (STAT): Mod: ER

## 2021-08-25 RX ORDER — DICLOFENAC SODIUM 10 MG/G
GEL TOPICAL
Qty: 100 G | Refills: 0 | Status: SHIPPED | OUTPATIENT
Start: 2021-08-25

## 2021-08-25 RX ORDER — METHOCARBAMOL 750 MG/1
1500 TABLET, FILM COATED ORAL EVERY 6 HOURS
Qty: 24 TABLET | Refills: 0 | Status: SHIPPED | OUTPATIENT
Start: 2021-08-25 | End: 2021-08-28

## 2021-08-25 RX ORDER — BUTALBITAL, ACETAMINOPHEN AND CAFFEINE 50; 325; 40 MG/1; MG/1; MG/1
1 TABLET ORAL EVERY 4 HOURS PRN
Qty: 12 TABLET | Refills: 0 | Status: SHIPPED | OUTPATIENT
Start: 2021-08-25 | End: 2021-09-24

## 2021-09-13 ENCOUNTER — TELEPHONE (OUTPATIENT)
Dept: EMERGENCY MEDICINE | Facility: HOSPITAL | Age: 56
End: 2021-09-13

## 2021-11-16 NOTE — ASSESSMENT & PLAN NOTE
-- DO NOT REPLY / DO NOT REPLY ALL --  -- Message is from the [facility]--    Request for Medical Clearance    Appointment secured? Yes    Type of surgery: [place] Tooth Removal   Location of surgery: [facility]   Date of surgery: 11/20/2021  Surgeon Name:    Other information:     Caller Information       Type Contact Phone    11/16/2021 02:51 PM CST Phone (Incoming) Hardeep Rosales  (Father) [phone]          Alternative phone number: None    Turnaround time given to caller:   \"This message will be sent to [state Provider's name]. The clinical team will fulfill your request as soon as they review your message.\"   Will start   cipro and flagyl  No CT scan findings  Consider appedicitis

## 2021-12-30 NOTE — ED NOTES
Patient placed on continuous cardiac monitor, automatic blood pressure cuff and continuous pulse oximeter.   I have chest pain. started around 8:15pm. it is not as bad but I still feel it.